# Patient Record
Sex: MALE | Employment: OTHER
[De-identification: names, ages, dates, MRNs, and addresses within clinical notes are randomized per-mention and may not be internally consistent; named-entity substitution may affect disease eponyms.]

---

## 2017-01-11 ENCOUNTER — PRIOR ORIGINAL RECORDS (OUTPATIENT)
Dept: OTHER | Age: 82
End: 2017-01-11

## 2017-02-15 ENCOUNTER — PRIOR ORIGINAL RECORDS (OUTPATIENT)
Dept: OTHER | Age: 82
End: 2017-02-15

## 2017-02-17 ENCOUNTER — APPOINTMENT (OUTPATIENT)
Dept: GENERAL RADIOLOGY | Facility: HOSPITAL | Age: 82
DRG: 872 | End: 2017-02-17
Attending: EMERGENCY MEDICINE
Payer: MEDICARE

## 2017-02-17 ENCOUNTER — HOSPITAL ENCOUNTER (INPATIENT)
Facility: HOSPITAL | Age: 82
LOS: 1 days | Discharge: HOME | DRG: 872 | End: 2017-02-19
Attending: EMERGENCY MEDICINE | Admitting: HOSPITALIST
Payer: MEDICARE

## 2017-02-17 DIAGNOSIS — E86.0 DEHYDRATION: ICD-10-CM

## 2017-02-17 DIAGNOSIS — N39.0 URINARY TRACT INFECTION WITHOUT HEMATURIA, SITE UNSPECIFIED: Primary | ICD-10-CM

## 2017-02-17 DIAGNOSIS — R11.2 NAUSEA AND VOMITING IN ADULT: ICD-10-CM

## 2017-02-17 LAB
ALBUMIN SERPL-MCNC: 3.6 G/DL (ref 3.5–4.8)
ALP LIVER SERPL-CCNC: 88 U/L (ref 45–117)
ALT SERPL-CCNC: 18 U/L (ref 17–63)
AST SERPL-CCNC: 22 U/L (ref 15–41)
BASOPHILS # BLD AUTO: 0.02 X10(3) UL (ref 0–0.1)
BASOPHILS NFR BLD AUTO: 0.2 %
BILIRUB SERPL-MCNC: 0.5 MG/DL (ref 0.1–2)
BILIRUB UR QL STRIP.AUTO: NEGATIVE
BUN BLD-MCNC: 30 MG/DL (ref 8–20)
CALCIUM BLD-MCNC: 8.6 MG/DL (ref 8.3–10.3)
CHLORIDE: 112 MMOL/L (ref 101–111)
CO2: 20 MMOL/L (ref 22–32)
COLOR UR AUTO: YELLOW
CREAT BLD-MCNC: 1.66 MG/DL (ref 0.7–1.3)
EOSINOPHIL # BLD AUTO: 0.06 X10(3) UL (ref 0–0.3)
EOSINOPHIL NFR BLD AUTO: 0.6 %
ERYTHROCYTE [DISTWIDTH] IN BLOOD BY AUTOMATED COUNT: 14 % (ref 11.5–16)
GLUCOSE BLD-MCNC: 117 MG/DL (ref 70–99)
GLUCOSE UR STRIP.AUTO-MCNC: NEGATIVE MG/DL
HCT VFR BLD AUTO: 39.4 % (ref 37–53)
HGB BLD-MCNC: 12.9 G/DL (ref 13–17)
IMMATURE GRANULOCYTE COUNT: 0.04 X10(3) UL (ref 0–1)
IMMATURE GRANULOCYTE RATIO %: 0.4 %
KETONES UR STRIP.AUTO-MCNC: NEGATIVE MG/DL
LACTIC ACID: 1.1 MMOL/L (ref 0.5–2)
LYMPHOCYTES # BLD AUTO: 0.57 X10(3) UL (ref 0.9–4)
LYMPHOCYTES NFR BLD AUTO: 5.5 %
M PROTEIN MFR SERPL ELPH: 7.4 G/DL (ref 6.1–8.3)
MCH RBC QN AUTO: 29.5 PG (ref 27–33.2)
MCHC RBC AUTO-ENTMCNC: 32.7 G/DL (ref 31–37)
MCV RBC AUTO: 90 FL (ref 80–99)
MONOCYTES # BLD AUTO: 0.36 X10(3) UL (ref 0.1–0.6)
MONOCYTES NFR BLD AUTO: 3.5 %
NEUTROPHIL ABS PRELIM: 9.3 X10 (3) UL (ref 1.3–6.7)
NEUTROPHILS # BLD AUTO: 9.3 X10(3) UL (ref 1.3–6.7)
NEUTROPHILS NFR BLD AUTO: 89.8 %
NITRITE UR QL STRIP.AUTO: NEGATIVE
PH UR STRIP.AUTO: 5 [PH] (ref 4.5–8)
PLATELET # BLD AUTO: 191 10(3)UL (ref 150–450)
POTASSIUM SERPL-SCNC: 5.1 MMOL/L (ref 3.6–5.1)
PROCALCITONIN SERPL-MCNC: 0.11 NG/ML (ref ?–0.11)
PROT UR STRIP.AUTO-MCNC: NEGATIVE MG/DL
RBC # BLD AUTO: 4.38 X10(6)UL (ref 3.8–5.8)
RBC #/AREA URNS AUTO: >10 /HPF
RED CELL DISTRIBUTION WIDTH-SD: 45.7 FL (ref 35.1–46.3)
SODIUM SERPL-SCNC: 142 MMOL/L (ref 136–144)
SP GR UR STRIP.AUTO: 1.01 (ref 1–1.03)
UROBILINOGEN UR STRIP.AUTO-MCNC: <2 MG/DL
WBC # BLD AUTO: 10.4 X10(3) UL (ref 4–13)
WBC #/AREA URNS AUTO: >50 /HPF

## 2017-02-17 PROCEDURE — 71010 XR CHEST AP PORTABLE  (CPT=71010): CPT

## 2017-02-17 PROCEDURE — 99223 1ST HOSP IP/OBS HIGH 75: CPT | Performed by: HOSPITALIST

## 2017-02-17 RX ORDER — ACETAMINOPHEN 500 MG
1000 TABLET ORAL ONCE
Status: COMPLETED | OUTPATIENT
Start: 2017-02-17 | End: 2017-02-17

## 2017-02-17 RX ORDER — METOCLOPRAMIDE HYDROCHLORIDE 5 MG/ML
10 INJECTION INTRAMUSCULAR; INTRAVENOUS ONCE
Status: COMPLETED | OUTPATIENT
Start: 2017-02-17 | End: 2017-02-17

## 2017-02-17 RX ORDER — DIPHENHYDRAMINE HYDROCHLORIDE 50 MG/ML
50 INJECTION INTRAMUSCULAR; INTRAVENOUS ONCE
Status: COMPLETED | OUTPATIENT
Start: 2017-02-17 | End: 2017-02-17

## 2017-02-17 RX ORDER — GABAPENTIN 300 MG/1
300 CAPSULE ORAL 3 TIMES DAILY
COMMUNITY
End: 2017-10-16 | Stop reason: ALTCHOICE

## 2017-02-17 RX ORDER — IPRATROPIUM BROMIDE AND ALBUTEROL SULFATE 2.5; .5 MG/3ML; MG/3ML
3 SOLUTION RESPIRATORY (INHALATION) ONCE
Status: COMPLETED | OUTPATIENT
Start: 2017-02-17 | End: 2017-02-17

## 2017-02-17 RX ORDER — ACETAMINOPHEN 650 MG/1
650 SUPPOSITORY RECTAL ONCE
Status: DISCONTINUED | OUTPATIENT
Start: 2017-02-17 | End: 2017-02-17

## 2017-02-17 RX ORDER — ONDANSETRON 2 MG/ML
4 INJECTION INTRAMUSCULAR; INTRAVENOUS ONCE
Status: COMPLETED | OUTPATIENT
Start: 2017-02-17 | End: 2017-02-17

## 2017-02-17 RX ORDER — SODIUM CHLORIDE 9 MG/ML
1000 INJECTION, SOLUTION INTRAVENOUS ONCE
Status: COMPLETED | OUTPATIENT
Start: 2017-02-17 | End: 2017-02-17

## 2017-02-18 ENCOUNTER — PRIOR ORIGINAL RECORDS (OUTPATIENT)
Dept: OTHER | Age: 82
End: 2017-02-18

## 2017-02-18 LAB
BUN BLD-MCNC: 29 MG/DL (ref 8–20)
CALCIUM BLD-MCNC: 7.7 MG/DL (ref 8.3–10.3)
CHLORIDE: 116 MMOL/L (ref 101–111)
CO2: 21 MMOL/L (ref 22–32)
CREAT BLD-MCNC: 1.72 MG/DL (ref 0.7–1.3)
ERYTHROCYTE [DISTWIDTH] IN BLOOD BY AUTOMATED COUNT: 14 % (ref 11.5–16)
GLUCOSE BLD-MCNC: 116 MG/DL (ref 70–99)
HCT VFR BLD AUTO: 36.6 % (ref 37–53)
HGB BLD-MCNC: 11.7 G/DL (ref 13–17)
MCH RBC QN AUTO: 29.6 PG (ref 27–33.2)
MCHC RBC AUTO-ENTMCNC: 32 G/DL (ref 31–37)
MCV RBC AUTO: 92.7 FL (ref 80–99)
PLATELET # BLD AUTO: 145 10(3)UL (ref 150–450)
POTASSIUM SERPL-SCNC: 4.3 MMOL/L (ref 3.6–5.1)
RBC # BLD AUTO: 3.95 X10(6)UL (ref 3.8–5.8)
RED CELL DISTRIBUTION WIDTH-SD: 47 FL (ref 35.1–46.3)
RESPIRATORY PANEL NEG:: NEGATIVE
SODIUM SERPL-SCNC: 145 MMOL/L (ref 136–144)
WBC # BLD AUTO: 8.3 X10(3) UL (ref 4–13)

## 2017-02-18 PROCEDURE — 99232 SBSQ HOSP IP/OBS MODERATE 35: CPT | Performed by: INTERNAL MEDICINE

## 2017-02-18 RX ORDER — BISACODYL 10 MG
10 SUPPOSITORY, RECTAL RECTAL
Status: DISCONTINUED | OUTPATIENT
Start: 2017-02-18 | End: 2017-02-19

## 2017-02-18 RX ORDER — HEPARIN SODIUM 5000 [USP'U]/ML
5000 INJECTION, SOLUTION INTRAVENOUS; SUBCUTANEOUS EVERY 8 HOURS
Status: DISCONTINUED | OUTPATIENT
Start: 2017-02-18 | End: 2017-02-19

## 2017-02-18 RX ORDER — FAMOTIDINE 20 MG/1
40 TABLET ORAL DAILY
Status: DISCONTINUED | OUTPATIENT
Start: 2017-02-18 | End: 2017-02-19

## 2017-02-18 RX ORDER — ASPIRIN 81 MG/1
81 TABLET, CHEWABLE ORAL DAILY
Status: DISCONTINUED | OUTPATIENT
Start: 2017-02-18 | End: 2017-02-19

## 2017-02-18 RX ORDER — SODIUM CHLORIDE 9 MG/ML
INJECTION, SOLUTION INTRAVENOUS CONTINUOUS
Status: DISCONTINUED | OUTPATIENT
Start: 2017-02-18 | End: 2017-02-19

## 2017-02-18 RX ORDER — FINASTERIDE 5 MG/1
5 TABLET, FILM COATED ORAL DAILY
Status: DISCONTINUED | OUTPATIENT
Start: 2017-02-18 | End: 2017-02-19

## 2017-02-18 RX ORDER — SODIUM PHOSPHATE, DIBASIC AND SODIUM PHOSPHATE, MONOBASIC 7; 19 G/133ML; G/133ML
1 ENEMA RECTAL ONCE AS NEEDED
Status: ACTIVE | OUTPATIENT
Start: 2017-02-18 | End: 2017-02-18

## 2017-02-18 RX ORDER — POLYETHYLENE GLYCOL 3350 17 G/17G
17 POWDER, FOR SOLUTION ORAL DAILY PRN
Status: DISCONTINUED | OUTPATIENT
Start: 2017-02-18 | End: 2017-02-19

## 2017-02-18 RX ORDER — SODIUM CHLORIDE 9 MG/ML
INJECTION, SOLUTION INTRAVENOUS CONTINUOUS
Status: ACTIVE | OUTPATIENT
Start: 2017-02-18 | End: 2017-02-18

## 2017-02-18 RX ORDER — ONDANSETRON 2 MG/ML
4 INJECTION INTRAMUSCULAR; INTRAVENOUS EVERY 6 HOURS PRN
Status: DISCONTINUED | OUTPATIENT
Start: 2017-02-18 | End: 2017-02-19

## 2017-02-18 RX ORDER — MIRTAZAPINE 15 MG/1
15 TABLET, FILM COATED ORAL NIGHTLY
Status: DISCONTINUED | OUTPATIENT
Start: 2017-02-18 | End: 2017-02-19

## 2017-02-18 RX ORDER — GABAPENTIN 300 MG/1
300 CAPSULE ORAL 3 TIMES DAILY
Status: DISCONTINUED | OUTPATIENT
Start: 2017-02-18 | End: 2017-02-19

## 2017-02-18 RX ORDER — PRAVASTATIN SODIUM 10 MG
10 TABLET ORAL NIGHTLY
Status: DISCONTINUED | OUTPATIENT
Start: 2017-02-18 | End: 2017-02-19

## 2017-02-18 RX ORDER — CLOPIDOGREL BISULFATE 75 MG/1
75 TABLET ORAL DAILY
Status: DISCONTINUED | OUTPATIENT
Start: 2017-02-18 | End: 2017-02-19

## 2017-02-18 RX ORDER — DOCUSATE SODIUM 100 MG/1
100 CAPSULE, LIQUID FILLED ORAL 2 TIMES DAILY
Status: DISCONTINUED | OUTPATIENT
Start: 2017-02-18 | End: 2017-02-19

## 2017-02-18 NOTE — H&P
[facility] HOSPITALIST  History and Physical     5 [facility]  Patient Status:  Emergency    10/3/1927 MRN [id number]   Location 656 [facility] Attending Angel Leo MD   Hosp Day # 0 PCP Anell Prader MD [place] [address] PERCUTANEOUS  TRANSLUMINAL CORONARY ANGIOPLASTY         Social History:  reports that he has never smoked. He has never used smokeless tobacco. He reports that he does not drink alcohol or use illicit drugs.     Family History:   Family History   Problem Re acute distress. Alert and oriented x 3. HEENT: Normocephalic atraumatic. Moist mucous membranes. EOM-I. PERRLA. Anicteric. Neck: No lymphadenopathy. No JVD. No carotid bruits. Respiratory: Clear to auscultation bilaterally. No wheezes. No rhonchi.   Card bedside.     Senthil Negron, DO  2/17/2017

## 2017-02-18 NOTE — CM/SW NOTE
02/18/17 0800   CM/SW Referral Data   Referral Source Physician;Social Work (self-referral)   Reason for Referral Discharge planning   Informant Patient   Pertinent Medical Hx   Primary Care Physician Name Marysol David   Patient Info   Patient's Mental S

## 2017-02-18 NOTE — PROGRESS NOTES
[facility]  Progress Note    5 [facility]  Patient Status:  Inpatient    10/3/1927 MRN [id number]   [facility] 4NW-A Attending Sameer Miranda MD   Hosp Day # 1 PCP Avel Maine MD [facility]     CC:  Nausea, vomiting, diarrhea at home, g BUN 29 02/18/2017    02/18/2017   K 4.3 02/18/2017    02/18/2017   CO2 21.0 02/18/2017    02/18/2017   CA 7.7 02/18/2017   ALB 3.6 02/17/2017   ALKPHO 88 02/17/2017   BILT 0.5 02/17/2017   TP 7.4 02/17/2017   AST 22 02/17/2017   ALT 18 Plavix. 4. Acute kidney injury due to dehydration-  Will continue IV fluids and recheck kidney function in the morning  5. Coronary artery disease-status post stents in the past.  Will continue on Plavix and statin.   6. Prostate cancer-patient on Lupron a

## 2017-02-18 NOTE — ED PROVIDER NOTES
Patient Seen in: [facility] Emergency Department    History   Patient presents with:  Fever Sepsis (infectious)    Stated Complaint: fever/sepsis    HPI    42-year-old male presents to the emergency department with a cough that began yesterday and t mg by mouth daily. mirtazapine (REMERON) 15 MG Oral Tab,  Take 15 mg by mouth nightly. celecoxib (CELEBREX) 200 MG Oral Cap,  Take 200 mg by mouth See Admin Instructions.  Every 2-3 days   Calcium Carbonate-Vitamin D (OSCAL-500) 500-200 MG-UNIT Oral T rate, regular rhythm, normal heart sounds and intact distal pulses. Pulmonary/Chest: Effort normal. No stridor. Slightly diminished in the bases   Abdominal: Soft. Bowel sounds are normal. There is no tenderness.    Musculoskeletal: Normal range of mot RAINBOW DRAW BLUE   RAINBOW DRAW GOLD   RAINBOW DRAW LAVENDER   RAINBOW DRAW LIGHT GREEN   BLOOD CULTURE   BLOOD CULTURE   URINE CULTURE, ROUTINE   RESPIRATORY PANEL FLU EXPANDED      EKG    Rate, intervals and axes as noted on EKG Report.   Rate: 107  Rh

## 2017-02-18 NOTE — PROGRESS NOTES
Pt.asleep on shift round;now awke for breakfast;son in law at bedside visiting;no complaints;consumed 80% of breakfast;paged Anamika bonner and notified of low bp 75/63 hr-80;md called back writing new order to bolus pt and call her back after

## 2017-02-19 VITALS
OXYGEN SATURATION: 99 % | DIASTOLIC BLOOD PRESSURE: 56 MMHG | TEMPERATURE: 98 F | HEART RATE: 91 BPM | BODY MASS INDEX: 24.89 KG/M2 | WEIGHT: 177.81 LBS | RESPIRATION RATE: 20 BRPM | SYSTOLIC BLOOD PRESSURE: 137 MMHG | HEIGHT: 71 IN

## 2017-02-19 PROBLEM — Z91.81 AT RISK FOR FALLING: Status: ACTIVE | Noted: 2017-02-19

## 2017-02-19 PROCEDURE — 99239 HOSP IP/OBS DSCHRG MGMT >30: CPT | Performed by: INTERNAL MEDICINE

## 2017-02-19 RX ORDER — NITROFURANTOIN 25; 75 MG/1; MG/1
100 CAPSULE ORAL 2 TIMES DAILY
Qty: 16 CAPSULE | Refills: 0 | Status: SHIPPED | OUTPATIENT
Start: 2017-02-19 | End: 2017-02-27

## 2017-02-19 NOTE — PROGRESS NOTES
[facility]  Progress Note    5 [facility]  Patient Status:  Inpatient    10/3/1927 MRN [id number]   [facility] 4NW-A Attending Shelly Naranjo MD   Hosp Day # 2 PCP Marylouise Hollering MD [facility]     CC:  Nausea, vomiting, diarrhea at home, g Bisulfate (PLAVIX) tab 75 mg 75 mg Oral Daily   finasteride (PROSCAR) tab 5 mg 5 mg Oral Daily   gabapentin (NEURONTIN) cap 300 mg 300 mg Oral TID   mirtazapine (REMERON) tab 15 mg 15 mg Oral Nightly   famoTIDine (PEPCID) tab 40 mg 40 mg Oral Daily   Prava Full  · Fatima: Patient straight caths at home for urinary retention as informed by patient, on Fatima while in hospital, DC Fatima at the time of discharge    Estimated date of discharge: today  Discharge is dependent on: clinical progress, await urine cx re

## 2017-02-19 NOTE — DISCHARGE SUMMARY
[facility]  Discharge Summary    5 [facility]  Patient Status:  Inpatient    10/3/1927 MRN [id number]   [facility] 4NW-A Attending Ary Teixeira MD   Hosp Day # 2 PCP Trista Zapata MD [address] [place] [address]     Date of Admission: 2017    Date o any focal weakness.  No hematochezia, melena, hematuria, hemoptysis, or hematemesis    Brief Synopsis:   · Possible UTI with possible mild sepsis on admission-treated with IV fluids IV antibiotics. Blood culture negative. Urine culture with mixed celsa. mouth 2 (two) times daily.     Stop taking on:  2/27/2017   Quantity:  16 capsule   Refills:  0         CONTINUE taking these medications       Instructions Prescription details    aspirin 81 MG Chew   Last time this was given:  81 mg on 2/19/2017  8:27 AM Refills:  0            Where to Get Your Medications      Please  your prescriptions at the location directed by your doctor or nurse     Bring a paper prescription for each of these medications    - Nitrofurantoin Monohyd Macro 100 MG Caps

## 2017-02-19 NOTE — PLAN OF CARE
GENITOURINARY - ADULT    • Absence of urinary retention Progressing        Fatima draining good amounts; output yellow clear;denies any feeling of fullness in bladder;afebrile;remains on iv abt for uti  MUSCULOSKELETAL - ADULT    • Return mobility to safest

## 2017-02-19 NOTE — PHYSICAL THERAPY NOTE
PHYSICAL THERAPY EVALUATION - INPATIENT     Room Number: 421/421-A  Evaluation Date: 2/19/2017  Type of Evaluation: Initial  Physician Order: PT Eval and Treat    Presenting Problem: UTI  Reason for Therapy: Mobility Dysfunction and Discharge Planning utilizes a cane in the community    SUBJECTIVE  The patient reports that he feels like he's ready to go home    Patient self-stated goal is to go home    OBJECTIVE     Fall Risk: High fall risk    WEIGHT BEARING RESTRICTION  Weight Bearing Restriction: Non balance. The patient was able to perform sit to stand transfers with stand by assistance. The patient was able to ambulate 200 feet with a single point cane with decreased foot clearance and step length bilaterally.  The patient demonstrates several small l demonstrate transfers Sit to/from Stand at assistance level: modified independent     Goal #3 Patient is able to ambulate 300 feet with assist device: cane - straight at assistance level: modified independent     Goal #4    Goal #5    Goal #6    Goal Comme

## 2017-02-19 NOTE — PROGRESS NOTES
NURSING DISCHARGE NOTE    Discharged Home via Wheelchair. Accompanied by Family member  Belongings Taken by patient/family.

## 2017-02-20 ENCOUNTER — PRIOR ORIGINAL RECORDS (OUTPATIENT)
Dept: OTHER | Age: 82
End: 2017-02-20

## 2017-02-20 LAB
ATRIAL RATE: 107 BPM
P AXIS: 75 DEGREES
P-R INTERVAL: 172 MS
Q-T INTERVAL: 356 MS
QRS DURATION: 80 MS
QTC CALCULATION (BEZET): 475 MS
R AXIS: 49 DEGREES
T AXIS: 85 DEGREES
VENTRICULAR RATE: 107 BPM

## 2017-02-21 LAB
ALBUMIN: 3.6 G/DL
ALKALINE PHOSPHATATE(ALK PHOS): 88 IU/L
BILIRUBIN TOTAL: 0.5 MG/DL
BUN: 29 MG/DL
BUN: 30 MG/DL
CALCIUM: 7.7 MG/DL
CALCIUM: 8.6 MG/DL
CHLORIDE: 112 MEQ/L
CHLORIDE: 116 MEQ/L
CREATININE, SERUM: 1.66 MG/DL
CREATININE, SERUM: 1.72 MG/DL
GLUCOSE: 116 MG/DL
GLUCOSE: 117 MG/DL
HEMATOCRIT: 36.6 %
HEMATOCRIT: 39.4 %
HEMOGLOBIN: 11.7 G/DL
HEMOGLOBIN: 12.9 G/DL
PLATELETS: 145 K/UL
PLATELETS: 191 K/UL
POTASSIUM, SERUM: 4.3 MEQ/L
POTASSIUM, SERUM: 5.1 MEQ/L
PROTEIN, TOTAL: 7.4 G/DL
RED BLOOD COUNT: 3.95 X 10-6/U
RED BLOOD COUNT: 4.38 X 10-6/U
SGOT (AST): 22 IU/L
SGPT (ALT): 18 IU/L
SODIUM: 142 MEQ/L
SODIUM: 145 MEQ/L
WHITE BLOOD COUNT: 10.4 X 10-3/U
WHITE BLOOD COUNT: 8.3 X 10-3/U

## 2017-02-24 NOTE — CM/SW NOTE
Called daughter regarding home care services, verified [facility] was setup through Dr Jacinto Lew office, agency has been in contact with patient/family.     Toro Webster RN,   Phone [phone]  Pager [phone]

## 2017-03-15 ENCOUNTER — PRIOR ORIGINAL RECORDS (OUTPATIENT)
Dept: OTHER | Age: 82
End: 2017-03-15

## 2017-04-12 ENCOUNTER — PRIOR ORIGINAL RECORDS (OUTPATIENT)
Dept: OTHER | Age: 82
End: 2017-04-12

## 2017-05-10 ENCOUNTER — PRIOR ORIGINAL RECORDS (OUTPATIENT)
Dept: OTHER | Age: 82
End: 2017-05-10

## 2017-06-07 ENCOUNTER — PRIOR ORIGINAL RECORDS (OUTPATIENT)
Dept: OTHER | Age: 82
End: 2017-06-07

## 2017-07-05 ENCOUNTER — PRIOR ORIGINAL RECORDS (OUTPATIENT)
Dept: OTHER | Age: 82
End: 2017-07-05

## 2017-08-02 ENCOUNTER — PRIOR ORIGINAL RECORDS (OUTPATIENT)
Dept: OTHER | Age: 82
End: 2017-08-02

## 2017-08-15 ENCOUNTER — HOSPITAL ENCOUNTER (OUTPATIENT)
Dept: CARDIOLOGY CLINIC | Facility: HOSPITAL | Age: 82
Discharge: HOME | End: 2017-08-15
Attending: INTERNAL MEDICINE

## 2017-08-15 ENCOUNTER — PRIOR ORIGINAL RECORDS (OUTPATIENT)
Dept: OTHER | Age: 82
End: 2017-08-15

## 2017-08-15 ENCOUNTER — MYAURORA ACCOUNT LINK (OUTPATIENT)
Dept: OTHER | Age: 82
End: 2017-08-15

## 2017-08-15 DIAGNOSIS — I70.92: ICD-10-CM

## 2017-08-29 ENCOUNTER — PRIOR ORIGINAL RECORDS (OUTPATIENT)
Dept: OTHER | Age: 82
End: 2017-08-29

## 2017-08-30 ENCOUNTER — PRIOR ORIGINAL RECORDS (OUTPATIENT)
Dept: OTHER | Age: 82
End: 2017-08-30

## 2017-08-31 ENCOUNTER — PRIOR ORIGINAL RECORDS (OUTPATIENT)
Dept: OTHER | Age: 82
End: 2017-08-31

## 2017-09-27 ENCOUNTER — PRIOR ORIGINAL RECORDS (OUTPATIENT)
Dept: OTHER | Age: 82
End: 2017-09-27

## 2017-10-18 ENCOUNTER — LAB ENCOUNTER (OUTPATIENT)
Dept: LAB | Facility: HOSPITAL | Age: 82
End: 2017-10-18
Attending: INTERNAL MEDICINE
Payer: MEDICARE

## 2017-10-18 ENCOUNTER — PRIOR ORIGINAL RECORDS (OUTPATIENT)
Dept: OTHER | Age: 82
End: 2017-10-18

## 2017-10-18 DIAGNOSIS — I70.90 ATHEROSCLEROSIS: ICD-10-CM

## 2017-10-18 PROCEDURE — 80048 BASIC METABOLIC PNL TOTAL CA: CPT

## 2017-10-18 PROCEDURE — 36415 COLL VENOUS BLD VENIPUNCTURE: CPT

## 2017-10-18 PROCEDURE — 85025 COMPLETE CBC W/AUTO DIFF WBC: CPT

## 2017-10-19 ENCOUNTER — APPOINTMENT (OUTPATIENT)
Dept: GENERAL RADIOLOGY | Facility: HOSPITAL | Age: 82
End: 2017-10-19
Attending: EMERGENCY MEDICINE
Payer: MEDICARE

## 2017-10-19 ENCOUNTER — HOSPITAL ENCOUNTER (OUTPATIENT)
Facility: HOSPITAL | Age: 82
Setting detail: OBSERVATION
LOS: 1 days | Discharge: HOME | End: 2017-10-20
Attending: EMERGENCY MEDICINE | Admitting: HOSPITALIST
Payer: MEDICARE

## 2017-10-19 DIAGNOSIS — R19.7 NAUSEA VOMITING AND DIARRHEA: ICD-10-CM

## 2017-10-19 DIAGNOSIS — R55 SYNCOPE AND COLLAPSE: Primary | ICD-10-CM

## 2017-10-19 DIAGNOSIS — R11.2 NAUSEA VOMITING AND DIARRHEA: ICD-10-CM

## 2017-10-19 PROCEDURE — 71010 XR CHEST AP PORTABLE  (CPT=71010): CPT | Performed by: EMERGENCY MEDICINE

## 2017-10-19 PROCEDURE — 99219 INITIAL OBSERVATION CARE,LEVL II: CPT | Performed by: INTERNAL MEDICINE

## 2017-10-19 RX ORDER — ONDANSETRON 2 MG/ML
4 INJECTION INTRAMUSCULAR; INTRAVENOUS EVERY 4 HOURS PRN
Status: DISCONTINUED | OUTPATIENT
Start: 2017-10-19 | End: 2017-10-20

## 2017-10-19 RX ORDER — MULTIPLE VITAMINS W/ MINERALS TAB 9MG-400MCG
1 TAB ORAL DAILY
Status: DISCONTINUED | OUTPATIENT
Start: 2017-10-19 | End: 2017-10-20

## 2017-10-19 RX ORDER — HYDROMORPHONE HYDROCHLORIDE 1 MG/ML
0.5 INJECTION, SOLUTION INTRAMUSCULAR; INTRAVENOUS; SUBCUTANEOUS EVERY 30 MIN PRN
Status: ACTIVE | OUTPATIENT
Start: 2017-10-19 | End: 2017-10-19

## 2017-10-19 RX ORDER — METOCLOPRAMIDE HYDROCHLORIDE 5 MG/ML
5 INJECTION INTRAMUSCULAR; INTRAVENOUS EVERY 8 HOURS PRN
Status: DISCONTINUED | OUTPATIENT
Start: 2017-10-19 | End: 2017-10-20

## 2017-10-19 RX ORDER — SODIUM CHLORIDE 9 MG/ML
INJECTION, SOLUTION INTRAVENOUS CONTINUOUS
Status: DISCONTINUED | OUTPATIENT
Start: 2017-10-19 | End: 2017-10-19

## 2017-10-19 RX ORDER — MIRTAZAPINE 15 MG/1
15 TABLET, FILM COATED ORAL NIGHTLY
Status: DISCONTINUED | OUTPATIENT
Start: 2017-10-19 | End: 2017-10-20

## 2017-10-19 RX ORDER — FINASTERIDE 5 MG/1
5 TABLET, FILM COATED ORAL DAILY
Status: DISCONTINUED | OUTPATIENT
Start: 2017-10-19 | End: 2017-10-20

## 2017-10-19 RX ORDER — ATORVASTATIN CALCIUM 20 MG/1
20 TABLET, FILM COATED ORAL NIGHTLY
Status: DISCONTINUED | OUTPATIENT
Start: 2017-10-19 | End: 2017-10-20

## 2017-10-19 RX ORDER — FAMOTIDINE 20 MG/1
20 TABLET ORAL DAILY
Status: DISCONTINUED | OUTPATIENT
Start: 2017-10-19 | End: 2017-10-20

## 2017-10-19 RX ORDER — HEPARIN SODIUM 5000 [USP'U]/ML
5000 INJECTION, SOLUTION INTRAVENOUS; SUBCUTANEOUS EVERY 12 HOURS SCHEDULED
Status: DISCONTINUED | OUTPATIENT
Start: 2017-10-19 | End: 2017-10-20

## 2017-10-19 RX ORDER — HYDROCODONE/ACETAMINOPHEN 5 MG-500MG
6 TABLET ORAL DAILY
Status: DISCONTINUED | OUTPATIENT
Start: 2017-10-19 | End: 2017-10-19 | Stop reason: RX

## 2017-10-19 RX ORDER — SODIUM CHLORIDE 9 MG/ML
INJECTION, SOLUTION INTRAVENOUS CONTINUOUS
Status: DISCONTINUED | OUTPATIENT
Start: 2017-10-19 | End: 2017-10-20

## 2017-10-19 RX ORDER — GARLIC EXTRACT 500 MG
1 CAPSULE ORAL DAILY
Status: DISCONTINUED | OUTPATIENT
Start: 2017-10-19 | End: 2017-10-20

## 2017-10-19 RX ORDER — SODIUM CHLORIDE 9 MG/ML
125 INJECTION, SOLUTION INTRAVENOUS CONTINUOUS
Status: DISCONTINUED | OUTPATIENT
Start: 2017-10-19 | End: 2017-10-20

## 2017-10-19 RX ORDER — ONDANSETRON 2 MG/ML
4 INJECTION INTRAMUSCULAR; INTRAVENOUS EVERY 6 HOURS PRN
Status: DISCONTINUED | OUTPATIENT
Start: 2017-10-19 | End: 2017-10-20

## 2017-10-19 RX ORDER — ACETAMINOPHEN 325 MG/1
650 TABLET ORAL EVERY 6 HOURS PRN
Status: DISCONTINUED | OUTPATIENT
Start: 2017-10-19 | End: 2017-10-20

## 2017-10-19 NOTE — H&P
[facility] HOSPITALIST  History and Physical     5 [facility]  Patient Status:  Emergency    10/3/1927 MRN [id number]   Location 656 [facility] Attending Bry Massey MD   Hosp Day # 0 PCP Benjy Evans MD [facility]     Chief Compl PERCUTANEOUS  TRANSLUMINAL CORONARY ANGIO*  No date: OTHER SURGICAL HISTORY      Comment: left leg bypass    Social History:  reports that he has never smoked. He has never used smokeless tobacco. He reports that he does not drink alcohol or use drugs. acute distress. Alert and oriented x 3. HEENT: Normocephalic atraumatic. Moist mucous membranes. EOM-I. PERRLA. Anicteric. Neck: No lymphadenopathy. No JVD. No carotid bruits. Respiratory: Clear to auscultation bilaterally. No wheezes. No rhonchi.   Card finasteride  8. Hyperlipidemia continue statin  9.  CAD - holding antiplatelets until cleared by cardiology, continue statin      Quality:  · DVT Prophylaxis: heparin  · CODE status: full  · Fatima: no    Plan of care discussed with pt and daughter, ER    Mi

## 2017-10-19 NOTE — CONSULTS
[facility]                       Gastroenterology [address] [facility] Gastroenterology    [facility] Patient Status:  Observation    10/3/1927 MRN [id number]   [facility] 3NE-A Attending Ray Virk MD   Hosp Day # 0 PCP JULIAN ([facility] [address]     prostate CA; hx lymphoma;skin CA   • CORONARY ARTERY DISEASE     hx stents   • Extrinsic asthma, unspecified    • GENITO-URINARY DISEASE     prostate CA: straight caths   • Hearing impairment    • Hypotension    • Neuropathy    • Osteoarthritis bowel disease  ROS:  In addition to the pertinent positives described above:             Infectious Disease:  No chronic infections or recent fevers prior to the acute illness            Cardiovascular: + CAD s/p stenting (distant), PVD            Respirato CREATSERUM 1.83 10/19/2017   BUN 35 10/19/2017    10/19/2017   K 4.1 10/19/2017    10/19/2017   CO2 23.0 10/19/2017    10/19/2017   CA 9.2 10/19/2017   ALB 3.3 10/19/2017   ALKPHO 87 10/19/2017   BILT 0.3 10/19/2017   AST 12 10/19/2017 cuture  2. Consider CT imaging of colon to assess for colitis     Thank you for the consultation, we will follow the patient with you.   INDER Mccoy  6:06 PM  10/19/2017  [facility] Gastroenterology  [phone]    GI attending addendum:    I have pe

## 2017-10-19 NOTE — ED INITIAL ASSESSMENT (HPI)
Pt has had diarrhea for a few days. Pt w syncope x 2 this am along w/ n/v. Pt to have angioplasty of L leg at 1100.

## 2017-10-19 NOTE — CONSULTS
[facility]  Cardiology Consultation    5 [facility]  Patient Status:  Observation    10/3/1927 MRN [id number]   [facility] 3NE-A Attending Guido Lennon MD   Hosp Day # 0 PCP Radha Tony MD [facility]     Reason for Consultation:  HOSP [facility] DR YUNIER AGUSTIN 0.9%  NaCl infusion, 125 mL/hr, Intravenous, Continuous  •  ondansetron HCl (ZOFRAN) injection 4 mg, 4 mg, Intravenous, Q4H PRN  •  0.9%  NaCl infusion, , Intravenous, Continuous  •  Heparin Sodium (Porcine) 5000 UNIT/ML injection 5,000 Units, 5,000 Units, edema. Peripheral pulses are 2+. Neurologic: Alert and oriented, normal affect. Skin: Warm and dry.      Laboratory Data:    Lab Results  Component Value Date   WBC 8.9 10/19/2017   HGB 10.8 10/19/2017   HCT 34.0 10/19/2017   .0 10/19/2017   CREAT

## 2017-10-19 NOTE — ED PROVIDER NOTES
Patient Seen in: [facility] Emergency Department    History   Patient presents with:  Syncope (cardiovascular, neurologic)  Nausea/Vomiting/Diarrhea (gastrointestinal)    Stated Complaint:     HPI    This is a 59-year-old male who states that he has Onset   • Cancer Mother        Smoking status: Never Smoker                                                              Smokeless tobacco: Never Used                      Alcohol use:  No                Review of Systems    Positive for stated complaint: DIFFERENTIAL - Abnormal; Notable for the following:     RBC 3.73 (*)     HGB 10.8 (*)     HCT 34.0 (*)     RDW-SD 47.2 (*)     Neutrophil Absolute Prelim 6.77 (*)     Neutrophil Absolute 6.77 (*)     All other components within normal limits   TROPONIN I - patient will be admitted for further evaluation I reexamined abdomen soft he does not have any specific complaints of chest pain or shortness of breath he is full code according to his daughter.       Disposition and Plan     Clinical Impression:  Syncope a

## 2017-10-19 NOTE — PROGRESS NOTES
Pharmacy Note: Renal dose adjustment for Metaclopramide (Reglan)  Maikel Garcia has been prescribed Metoclopramide (Reglan) 10 mg every 8 hours as needed. Estimated Creatinine Clearance: 28.6 mL/min (based on SCr of 1.83 mg/dL (H)).     His calculated cr

## 2017-10-19 NOTE — HISTORICAL OFFICE NOTE
Tin Gomez  : 10/03/1927  08/29/17 16:54:00  ACCOUNT:  [id number]  HOME PHONE:  [phone]  WORK PHONE:  [phone]    Phone Conversation:     LE arterial US (dictated to EMR on ) was to be reviewed at  OV, this OV was cancelled and r/s for superficial femoral artery greater than 75% with 50 to 74% stenosis proximally in the distal superficial femoral artery and approximally 50% in the mid superficial femoral artery.   5. One-vessel runoff to the right foot with occluded anterior tibial and po ratio of 5.6 and a little bit proximal to this is elevated velocity of 288 cm/s with a velocity ratio of 3.4, consistent with 50-74% stenosis. The popiteal and tibial peroneal trunk are patent.  There is one-vessel runoff to the right foot with an occluded drug-coated stent in 2006. No real chest pain but significantly fatigued and weak. RISK FACTORS:  CAD Equivalent - Peripheral Vasc Disease    REVIEW OF SYSTEMS:  CONS: no fever, chills, or recent weight changes. EYES: denies significant visual changes. ENT: mucosa pink and moist. NECK: jugular venous pressure not elevated. RESP: respirations with normal rate and rhythm, clear to auscultation. GI: soft, nontender. MS: gait precludes exercise testing and walks with cane. EXT: no clubbing or cyanosis.   SKIN 1200-100  1 daily  05/18/15 Lupron Depot          45MG      injection as directed  05/18/15 Lutein                6MG       1 tablet daily  05/18/15 Mirtazapine           15MG      1 tablet at bedtime  05/18/15 Vitamin A             [id number]  1 tablet at b

## 2017-10-19 NOTE — PROGRESS NOTES
NURSING ADMISSION NOTE      Patient admitted via Cart  Oriented to room. Safety precautions initiated. Bed in low position. Call light in reach.     A/O x 4  Daughter with patient at bedside upon admission  Patient had 550ml out with straight cath up

## 2017-10-20 ENCOUNTER — APPOINTMENT (OUTPATIENT)
Dept: CV DIAGNOSTICS | Facility: HOSPITAL | Age: 82
End: 2017-10-20
Attending: INTERNAL MEDICINE
Payer: MEDICARE

## 2017-10-20 ENCOUNTER — PRIOR ORIGINAL RECORDS (OUTPATIENT)
Dept: OTHER | Age: 82
End: 2017-10-20

## 2017-10-20 ENCOUNTER — APPOINTMENT (OUTPATIENT)
Dept: CT IMAGING | Facility: HOSPITAL | Age: 82
End: 2017-10-20
Attending: INTERNAL MEDICINE
Payer: MEDICARE

## 2017-10-20 VITALS
HEART RATE: 85 BPM | TEMPERATURE: 98 F | SYSTOLIC BLOOD PRESSURE: 153 MMHG | DIASTOLIC BLOOD PRESSURE: 76 MMHG | HEIGHT: 71 IN | RESPIRATION RATE: 20 BRPM | BODY MASS INDEX: 25.25 KG/M2 | OXYGEN SATURATION: 97 % | WEIGHT: 180.38 LBS

## 2017-10-20 PROCEDURE — 93306 TTE W/DOPPLER COMPLETE: CPT | Performed by: INTERNAL MEDICINE

## 2017-10-20 PROCEDURE — 99217 OBSERVATION CARE DISCHARGE: CPT | Performed by: INTERNAL MEDICINE

## 2017-10-20 PROCEDURE — 74176 CT ABD & PELVIS W/O CONTRAST: CPT | Performed by: INTERNAL MEDICINE

## 2017-10-20 RX ORDER — CLOPIDOGREL BISULFATE 75 MG/1
75 TABLET ORAL DAILY
Status: DISCONTINUED | OUTPATIENT
Start: 2017-10-20 | End: 2017-10-20

## 2017-10-20 RX ORDER — GARLIC EXTRACT 500 MG
1 CAPSULE ORAL DAILY
Qty: 90 CAPSULE | Refills: 0 | Status: SHIPPED | OUTPATIENT
Start: 2017-10-21 | End: 2018-01-05

## 2017-10-20 RX ORDER — ASPIRIN 81 MG/1
81 TABLET, CHEWABLE ORAL DAILY
Status: DISCONTINUED | OUTPATIENT
Start: 2017-10-20 | End: 2017-10-20

## 2017-10-20 NOTE — CM/SW NOTE
10/20/17 1100   CM/SW Referral Data   Referral Source    Reason for Referral Discharge planning   Informant Patient   Patient Info   Patient's Mental Status Alert;Oriented   Patient's [address]   Number of Levels in Home 1   Dionne Munoz

## 2017-10-20 NOTE — PROGRESS NOTES
NURSING DISCHARGE NOTE    Discharged Home via Wheelchair. Accompanied by Family member and Support staff  Belongings Taken by patient/family. Reviewed discharge instructions with patient and daughter.   Reviewed instructions on getting holter monito

## 2017-10-20 NOTE — BH PROGRESS NOTE
Attempted echo and informed patient was having a CT scan, will attempt echo again in twenty minutes.

## 2017-10-20 NOTE — CM/SW NOTE
Patient failed inpatient criteria. Second level of review completed and supports observation. UR committee in agreement. Discussed with Dr. Darrell Yoo who approves observation status. Observation letter given to the patient and order written.

## 2017-10-20 NOTE — PROGRESS NOTES
[facility] H[facility]PITAL[facility]  Progress Note     [facility] Patient Status:  Observation    10/3/1927 MRN [id number]   [facility] 3NE-A Attending Ant Cason MD   Hosp Day # 0 PCP Judy Marroquin MD [address] [place] [address]     Chief Complaint: syncope    S: Antonia Nikole Nightly   • multivitamin with minerals  1 tablet Oral Daily   • famoTIDine  20 mg Oral Daily   • atorvastatin  20 mg Oral Nightly   • Acidophilus/Pectin  1 capsule Oral Daily       ASSESSMENT / PLAN:     1.  Syncope and collapse -most likely vasovagal and i

## 2017-10-20 NOTE — PLAN OF CARE
Reviewed echo with Dr. Ranjan Harper. EF low limits normal. At least moderate AS, mean gradient 22 mmHg. Stable for discharge with holter as previously outlined. Follow up in the clinic after holter is completed.       Master Packer NP   10/20/2017  5:13 PM

## 2017-10-20 NOTE — PROGRESS NOTES
[facility]  Progress Note    [facility] Patient Status:  Observation    10/3/1927 MRN [id number]   [facility] 3NE-A Attending Luiz Souza MD   Hosp Day # 0 PCP Pama Backers MD [facility]     Assessment:  1.   Syncope - sounds seconda 10/20/2017   ALT 14 10/19/2017   AST 12 10/19/2017   ALB 3.3 10/19/2017          Lab Results  Component Value Date   TROP <0.046 10/19/2017   TROP <0.046 06/07/2016   TROP <0.046 03/04/2015   CKMB 3.4 09/30/2013   CKMB 2.7 03/02/2012   CKMB 2.5 03/01/2012

## 2017-10-20 NOTE — PROGRESS NOTES
Gastroenterology Progress Note  S: No further diarrhea after the one yesterday, C.diff negative. Feels well.   O: /60 (BP Location: Left arm)   Pulse 92   Temp 98.1 °F (36.7 °C) (Oral)   Resp 20   Ht 5' 11\" (1.803 m)   Wt 180 lb 6.4 oz (81.8 kg)   Sp

## 2017-10-20 NOTE — PLAN OF CARE
Patient A&O x4, no c/o pain, lungs clear. 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 Patient ambulates with walker to bathroom and self caths for urine.

## 2017-10-21 NOTE — DISCHARGE SUMMARY
[facility]OSPITAL[facility]  DISCHARGE SUMMARY     [facility] Patient Status:  Observation    10/3/1927 MRN [id number]   [facility] 3NE-A Attending No att. providers found   Hosp Day # 1 PCP Radha Tony MD [address] [place] [address]     Date of Admission: 10/19/2 vomiting swelling or rash. He has no increased pain in his abdomen or lower extremities. Brief Synopsis: Patient was seen by both cardiology, electrophysiology and GI.   Patient improved rapidly overnight did not have any further diarrhea and C. diffici abnormalities. Doppler parameters are consistent     with abnormal left ventricular relaxation - grade 1 diastolic     dysfunction. 2. Aortic valve: Trileaflet; moderately thickened, moderately calcified     leaflets. Cusp separation was reduced.  Camron Crockett Tabs  Commonly known as:  PLAVIX      Take 75 mg by mouth daily. Refills:  0     finasteride 5 MG Tabs  Commonly known as:  PROSCAR      Take 5 mg by mouth daily. Refills:  0     LUPRON IJ      Inject 1 Dose as directed every 6 (six) months.    Refills: nondistended. Positive bowel sounds. No rebound or guarding. Neurologic: No focal neurological deficits. Musculoskeletal: Moves all extremities. Extremities: No edema.   ----------------------------------------------------------------------------------

## 2017-10-23 ENCOUNTER — PRIOR ORIGINAL RECORDS (OUTPATIENT)
Dept: OTHER | Age: 82
End: 2017-10-23

## 2017-10-25 ENCOUNTER — PRIOR ORIGINAL RECORDS (OUTPATIENT)
Dept: OTHER | Age: 82
End: 2017-10-25

## 2017-10-26 ENCOUNTER — HOSPITAL ENCOUNTER (OUTPATIENT)
Dept: CV DIAGNOSTICS | Facility: HOSPITAL | Age: 82
Discharge: HOME | End: 2017-10-26
Attending: INTERNAL MEDICINE
Payer: MEDICARE

## 2017-10-26 DIAGNOSIS — R55 SYNCOPE AND COLLAPSE: ICD-10-CM

## 2017-10-26 PROCEDURE — 93225 XTRNL ECG REC<48 HRS REC: CPT | Performed by: INTERNAL MEDICINE

## 2017-10-26 PROCEDURE — 93227 XTRNL ECG REC<48 HR R&I: CPT | Performed by: INTERNAL MEDICINE

## 2017-10-26 PROCEDURE — 93226 XTRNL ECG REC<48 HR SCAN A/R: CPT | Performed by: INTERNAL MEDICINE

## 2017-10-31 LAB
BUN: 35 MG/DL
CALCIUM: 9.5 MG/DL
CHLORIDE: 110 MEQ/L
CREATININE, SERUM: 1.77 MG/DL
GLUCOSE: 82 MG/DL
HEMATOCRIT: 35.1 %
HEMOGLOBIN: 11.1 G/DL
PLATELETS: 250 K/UL
POTASSIUM, SERUM: 4.7 MEQ/L
RED BLOOD COUNT: 3.85 X 10-6/U
SODIUM: 142 MEQ/L
WHITE BLOOD COUNT: 7.8 X 10-3/U

## 2017-11-06 LAB
BUN: 31 MG/DL
CALCIUM: 8.4 MG/DL
CHLORIDE: 112 MEQ/L
CREATININE, SERUM: 1.73 MG/DL
GLUCOSE: 100 MG/DL
HEMATOCRIT: 30.9 %
HEMOGLOBIN: 9.8 G/DL
MAGNESIUM: 2.2 MG/DL
PLATELETS: 180 K/UL
POTASSIUM, SERUM: 4.5 MEQ/L
RED BLOOD COUNT: 3.42 X 10-6/U
SODIUM: 144 MEQ/L
WHITE BLOOD COUNT: 7.6 X 10-3/U

## 2017-11-10 ENCOUNTER — PRIOR ORIGINAL RECORDS (OUTPATIENT)
Dept: OTHER | Age: 82
End: 2017-11-10

## 2017-11-22 ENCOUNTER — PRIOR ORIGINAL RECORDS (OUTPATIENT)
Dept: OTHER | Age: 82
End: 2017-11-22

## 2017-12-04 ENCOUNTER — PRIOR ORIGINAL RECORDS (OUTPATIENT)
Dept: OTHER | Age: 82
End: 2017-12-04

## 2017-12-20 ENCOUNTER — PRIOR ORIGINAL RECORDS (OUTPATIENT)
Dept: OTHER | Age: 82
End: 2017-12-20

## 2017-12-31 ENCOUNTER — PRIOR ORIGINAL RECORDS (OUTPATIENT)
Dept: OTHER | Age: 82
End: 2017-12-31

## 2018-01-05 RX ORDER — BICALUTAMIDE 50 MG/1
50 TABLET, FILM COATED ORAL DAILY
COMMUNITY
End: 2019-01-01

## 2018-01-05 NOTE — HISTORICAL OFFICE NOTE
Karolyn Mckenzie  : 10/03/1927  ACCOUNT:  [id number]  [phone]  PCP: Dr. Melly Sanford    TODAY'S DATE: 2017  DICTATED BY:  John Willis M.D.]    CHIEF COMPLAINT: [Followup of .  CAD, of native vessels, Followup of Atherosclerosis, extrem chron tota for premature CAD. Negative for AAA. SOCIAL HISTORY: SMOKING: Never used tobacco. denies smoking. CAFFEINE: 2 beverages daily. ALCOHOL: denies drinking. EXERCISE: no regular exercise. DIET: low fat, low cholesterol and low sodium diet.  MARITAL STATUS: m significant change in symptoms. At this point, he is fairly well compensated. ASSESSMENT:  1. . CAD, of native vessels  2. Atherosclerosis, extrem chron total occlusion  3. History of drug eluting stent LAD, 12-06  4.  History of vascular surgery, l fem

## 2018-01-05 NOTE — HISTORICAL OFFICE NOTE
Ana Love  : 10/03/1927  ACCOUNT:  [id number]  [phone]  PCP: Dr. Kaye Lara     TODAY'S DATE: 10/23/2017  DICTATED BY:  Karlo LOVING]    CHIEF COMPLAINT: [hospital discharge and syncope.]    HPI:    [On 10/23/2017, Satya 84, a 80 year cancer, bone cancer, tonsillectomy Over 20 years ago and Plastic surgery for cancer to nose and face 2008    PAST CV HISTORY: 10/2014, 12/2006, 5/2011, 6/2016, CAD, cardiac catheterization, dyslipidemia, L femoropopliteal above-knee bypass w/ hemashield gr angiogram.  At this time we discussed completing the 48 hour Holter monitor prior to having him undergo peripheral angiogram.  Patient and daughter will check downstairs to see when they are able to put the monitor on.   He is also to be following up with [facility]

## 2018-01-10 ENCOUNTER — HOSPITAL ENCOUNTER (OUTPATIENT)
Dept: INTERVENTIONAL RADIOLOGY/VASCULAR | Facility: HOSPITAL | Age: 83
Discharge: HOME | End: 2018-01-11
Attending: INTERNAL MEDICINE | Admitting: INTERNAL MEDICINE
Payer: MEDICARE

## 2018-01-10 DIAGNOSIS — I70.213 ATHEROSCLER OF NATIVE ARTERY OF BOTH LEGS WITH INTERMIT CLAUDICATION (HCC): ICD-10-CM

## 2018-01-10 LAB
ATRIAL RATE: 87 BPM
BASOPHILS # BLD AUTO: 0.04 X10(3) UL (ref 0–0.1)
BASOPHILS NFR BLD AUTO: 0.6 %
BUN BLD-MCNC: 38 MG/DL (ref 8–20)
CALCIUM BLD-MCNC: 9 MG/DL (ref 8.3–10.3)
CHLORIDE: 113 MMOL/L (ref 101–111)
CO2: 25 MMOL/L (ref 22–32)
CREAT BLD-MCNC: 1.98 MG/DL (ref 0.7–1.3)
EOSINOPHIL # BLD AUTO: 0.26 X10(3) UL (ref 0–0.3)
EOSINOPHIL NFR BLD AUTO: 3.8 %
ERYTHROCYTE [DISTWIDTH] IN BLOOD BY AUTOMATED COUNT: 13.6 % (ref 11.5–16)
GLUCOSE BLD-MCNC: 92 MG/DL (ref 70–99)
HCT VFR BLD AUTO: 31.4 % (ref 37–53)
HGB BLD-MCNC: 10.5 G/DL (ref 13–17)
IMMATURE GRANULOCYTE COUNT: 0.03 X10(3) UL (ref 0–1)
IMMATURE GRANULOCYTE RATIO %: 0.4 %
LYMPHOCYTES # BLD AUTO: 1.24 X10(3) UL (ref 0.9–4)
LYMPHOCYTES NFR BLD AUTO: 18.3 %
MCH RBC QN AUTO: 29.6 PG (ref 27–33.2)
MCHC RBC AUTO-ENTMCNC: 33.4 G/DL (ref 31–37)
MCV RBC AUTO: 88.5 FL (ref 80–99)
MONOCYTES # BLD AUTO: 0.41 X10(3) UL (ref 0.1–0.6)
MONOCYTES NFR BLD AUTO: 6.1 %
NEUTROPHIL ABS PRELIM: 4.79 X10 (3) UL (ref 1.3–6.7)
NEUTROPHILS # BLD AUTO: 4.79 X10(3) UL (ref 1.3–6.7)
NEUTROPHILS NFR BLD AUTO: 70.8 %
P AXIS: 63 DEGREES
P-R INTERVAL: 182 MS
PLATELET # BLD AUTO: 190 10(3)UL (ref 150–450)
POTASSIUM SERPL-SCNC: 4.3 MMOL/L (ref 3.6–5.1)
Q-T INTERVAL: 382 MS
QRS DURATION: 84 MS
QTC CALCULATION (BEZET): 459 MS
R AXIS: 62 DEGREES
RBC # BLD AUTO: 3.55 X10(6)UL (ref 3.8–5.8)
RED CELL DISTRIBUTION WIDTH-SD: 44.6 FL (ref 35.1–46.3)
SODIUM SERPL-SCNC: 146 MMOL/L (ref 136–144)
T AXIS: 78 DEGREES
VENTRICULAR RATE: 87 BPM
WBC # BLD AUTO: 6.8 X10(3) UL (ref 4–13)

## 2018-01-10 PROCEDURE — 85025 COMPLETE CBC W/AUTO DIFF WBC: CPT | Performed by: INTERNAL MEDICINE

## 2018-01-10 PROCEDURE — 75710 ARTERY X-RAYS ARM/LEG: CPT

## 2018-01-10 PROCEDURE — B41GYZZ FLUOROSCOPY OF LEFT LOWER EXTREMITY ARTERIES USING OTHER CONTRAST: ICD-10-PCS | Performed by: INTERNAL MEDICINE

## 2018-01-10 PROCEDURE — 93005 ELECTROCARDIOGRAM TRACING: CPT

## 2018-01-10 PROCEDURE — 80048 BASIC METABOLIC PNL TOTAL CA: CPT | Performed by: INTERNAL MEDICINE

## 2018-01-10 PROCEDURE — 36246 INS CATH ABD/L-EXT ART 2ND: CPT

## 2018-01-10 PROCEDURE — 93010 ELECTROCARDIOGRAM REPORT: CPT | Performed by: INTERNAL MEDICINE

## 2018-01-10 PROCEDURE — 36415 COLL VENOUS BLD VENIPUNCTURE: CPT

## 2018-01-10 RX ORDER — FINASTERIDE 5 MG/1
5 TABLET, FILM COATED ORAL DAILY
Status: DISCONTINUED | OUTPATIENT
Start: 2018-01-11 | End: 2018-01-11

## 2018-01-10 RX ORDER — MIRTAZAPINE 15 MG/1
15 TABLET, FILM COATED ORAL NIGHTLY
Status: DISCONTINUED | OUTPATIENT
Start: 2018-01-10 | End: 2018-01-11

## 2018-01-10 RX ORDER — ASPIRIN 81 MG/1
81 TABLET, CHEWABLE ORAL DAILY
Status: DISCONTINUED | OUTPATIENT
Start: 2018-01-11 | End: 2018-01-11

## 2018-01-10 RX ORDER — BICALUTAMIDE 50 MG/1
50 TABLET, FILM COATED ORAL NIGHTLY
Status: DISCONTINUED | OUTPATIENT
Start: 2018-01-10 | End: 2018-01-11

## 2018-01-10 RX ORDER — LIDOCAINE HYDROCHLORIDE 10 MG/ML
INJECTION, SOLUTION EPIDURAL; INFILTRATION; INTRACAUDAL; PERINEURAL
Status: COMPLETED
Start: 2018-01-10 | End: 2018-01-10

## 2018-01-10 RX ORDER — HEPARIN SODIUM 5000 [USP'U]/ML
INJECTION, SOLUTION INTRAVENOUS; SUBCUTANEOUS
Status: DISCONTINUED
Start: 2018-01-10 | End: 2018-01-10 | Stop reason: WASHOUT

## 2018-01-10 RX ORDER — HEPARIN SODIUM 5000 [USP'U]/ML
INJECTION, SOLUTION INTRAVENOUS; SUBCUTANEOUS
Status: COMPLETED
Start: 2018-01-10 | End: 2018-01-10

## 2018-01-10 RX ORDER — HYDROCODONE BITARTRATE AND ACETAMINOPHEN 5; 325 MG/1; MG/1
1 TABLET ORAL EVERY 4 HOURS PRN
Status: DISCONTINUED | OUTPATIENT
Start: 2018-01-10 | End: 2018-01-11

## 2018-01-10 RX ORDER — ACETAMINOPHEN 325 MG/1
650 TABLET ORAL EVERY 4 HOURS PRN
Status: DISCONTINUED | OUTPATIENT
Start: 2018-01-10 | End: 2018-01-11

## 2018-01-10 RX ORDER — PRAVASTATIN SODIUM 10 MG
10 TABLET ORAL NIGHTLY
Status: DISCONTINUED | OUTPATIENT
Start: 2018-01-10 | End: 2018-01-11

## 2018-01-10 RX ORDER — SODIUM CHLORIDE 9 MG/ML
INJECTION, SOLUTION INTRAVENOUS CONTINUOUS
Status: DISCONTINUED | OUTPATIENT
Start: 2018-01-10 | End: 2018-01-11

## 2018-01-10 RX ORDER — HYDROCODONE BITARTRATE AND ACETAMINOPHEN 5; 325 MG/1; MG/1
2 TABLET ORAL EVERY 4 HOURS PRN
Status: DISCONTINUED | OUTPATIENT
Start: 2018-01-10 | End: 2018-01-11

## 2018-01-10 RX ORDER — CLOPIDOGREL BISULFATE 75 MG/1
75 TABLET ORAL DAILY
Status: DISCONTINUED | OUTPATIENT
Start: 2018-01-11 | End: 2018-01-11

## 2018-01-10 RX ORDER — MIDAZOLAM HYDROCHLORIDE 1 MG/ML
INJECTION INTRAMUSCULAR; INTRAVENOUS
Status: COMPLETED
Start: 2018-01-10 | End: 2018-01-10

## 2018-01-10 RX ADMIN — BICALUTAMIDE 50 MG: 50 TABLET, FILM COATED ORAL at 20:43:00

## 2018-01-10 RX ADMIN — MIRTAZAPINE 15 MG: 15 TABLET, FILM COATED ORAL at 20:44:00

## 2018-01-10 RX ADMIN — PRAVASTATIN SODIUM 10 MG: 10 MG TABLET ORAL at 20:44:00

## 2018-01-10 NOTE — H&P
History & Physical Examination    Patient Name: Ravin Anderson  MRN: [id number]  [facility]: [id number]  YOB: 1927    Diagnosis: CAD, HLD, Metastatic Prostate Cancer, PVD with prior femoral bypass x2, abnormal ultrasound     History of Present Illness DISEASE     hx stents   • Diarrhea, unspecified    • Dizziness    • Easy bruising    • Extrinsic asthma, unspecified    • Eye disease    • Fatigue    • Frequent urination    • GENITO-URINARY DISEASE     prostate CA: straight caths   • Hearing impairment

## 2018-01-11 ENCOUNTER — PRIOR ORIGINAL RECORDS (OUTPATIENT)
Dept: OTHER | Age: 83
End: 2018-01-11

## 2018-01-11 VITALS
DIASTOLIC BLOOD PRESSURE: 76 MMHG | HEART RATE: 86 BPM | SYSTOLIC BLOOD PRESSURE: 112 MMHG | TEMPERATURE: 98 F | WEIGHT: 178.56 LBS | BODY MASS INDEX: 25 KG/M2 | RESPIRATION RATE: 18 BRPM | OXYGEN SATURATION: 96 %

## 2018-01-11 LAB
BASOPHILS # BLD AUTO: 0.05 X10(3) UL (ref 0–0.1)
BASOPHILS NFR BLD AUTO: 0.7 %
BUN BLD-MCNC: 35 MG/DL (ref 8–20)
CALCIUM BLD-MCNC: 9 MG/DL (ref 8.3–10.3)
CHLORIDE: 110 MMOL/L (ref 101–111)
CO2: 25 MMOL/L (ref 22–32)
CREAT BLD-MCNC: 1.71 MG/DL (ref 0.7–1.3)
EOSINOPHIL # BLD AUTO: 0.47 X10(3) UL (ref 0–0.3)
EOSINOPHIL NFR BLD AUTO: 6.4 %
ERYTHROCYTE [DISTWIDTH] IN BLOOD BY AUTOMATED COUNT: 13.8 % (ref 11.5–16)
GLUCOSE BLD-MCNC: 105 MG/DL (ref 70–99)
HCT VFR BLD AUTO: 32.4 % (ref 37–53)
HGB BLD-MCNC: 10.5 G/DL (ref 13–17)
IMMATURE GRANULOCYTE COUNT: 0.05 X10(3) UL (ref 0–1)
IMMATURE GRANULOCYTE RATIO %: 0.7 %
LYMPHOCYTES # BLD AUTO: 1.84 X10(3) UL (ref 0.9–4)
LYMPHOCYTES NFR BLD AUTO: 25.2 %
MCH RBC QN AUTO: 28.8 PG (ref 27–33.2)
MCHC RBC AUTO-ENTMCNC: 32.4 G/DL (ref 31–37)
MCV RBC AUTO: 89 FL (ref 80–99)
MONOCYTES # BLD AUTO: 0.51 X10(3) UL (ref 0.1–0.6)
MONOCYTES NFR BLD AUTO: 7 %
NEUTROPHIL ABS PRELIM: 4.37 X10 (3) UL (ref 1.3–6.7)
NEUTROPHILS # BLD AUTO: 4.37 X10(3) UL (ref 1.3–6.7)
NEUTROPHILS NFR BLD AUTO: 60 %
PLATELET # BLD AUTO: 207 10(3)UL (ref 150–450)
POTASSIUM SERPL-SCNC: 4.3 MMOL/L (ref 3.6–5.1)
RBC # BLD AUTO: 3.64 X10(6)UL (ref 3.8–5.8)
RED CELL DISTRIBUTION WIDTH-SD: 44.9 FL (ref 35.1–46.3)
SODIUM SERPL-SCNC: 142 MMOL/L (ref 136–144)
WBC # BLD AUTO: 7.3 X10(3) UL (ref 4–13)

## 2018-01-11 PROCEDURE — 80048 BASIC METABOLIC PNL TOTAL CA: CPT | Performed by: INTERNAL MEDICINE

## 2018-01-11 PROCEDURE — 85025 COMPLETE CBC W/AUTO DIFF WBC: CPT | Performed by: INTERNAL MEDICINE

## 2018-01-11 RX ADMIN — CLOPIDOGREL BISULFATE 75 MG: 75 TABLET ORAL at 08:25:00

## 2018-01-11 RX ADMIN — ASPIRIN 81 MG: 81 TABLET, CHEWABLE ORAL at 08:25:00

## 2018-01-11 RX ADMIN — FINASTERIDE 5 MG: 5 TABLET, FILM COATED ORAL at 08:25:00

## 2018-01-11 NOTE — CM/SW NOTE
01/11/18 1200   CM/SW Screening   Referral Source Social Work (self-referral)     Patient was screened during rounds and no needs are identified at this time. RN to contact SW/CM if needs arise.

## 2018-01-11 NOTE — PROGRESS NOTES
[facility]  Cardiology Progress Note    Subjective:  No chest pain or shortness of breath. Feels good. Denies any groin pain.     Objective:  /76 (BP Location: Right arm)   Pulse 86   Temp 98.2 °F (36.8 °C) (Oral)   Resp 18   Wt 178 lb 9.2 oz ( diarrhea    POD #1 s/p peripheral angiography - plan for ongoing medical therapy    Plan:  - Home today  - f/u in office ~2 weeks, call for appointment with IN[facility]  - See AVS for medications    INDER Strong  1/11/2018  10:27 AM

## 2018-01-11 NOTE — PROGRESS NOTES
NURSING DISCHARGE NOTE    Discharged home via private car  Accompanied by son in law, transport via w/c   Belongings packed up by patient and taken with    All d/c instructions discussed with patient and son in law. Medications and f/u appts discussed.

## 2018-01-11 NOTE — PLAN OF CARE
Assumed care at 1900. A&Ox4, VSS on RA. SR on tele. Rt groin with gauze and tegaderm, dry and intact. Site soft, non-tender. Denies any pain. Pedal pulses +1-2. Up with contact guard/steadingy assist with a cane. Pt straight caths prn as per baseline.  Need

## 2018-01-11 NOTE — PROGRESS NOTES
NURSING ADMISSION NOTE      Patient admitted via IV suites- cath lab  Oriented to room. Safety precautions initiated. Bed in low position. Call light in reach.     Assumed care of patient at 1830  AOx4, [place]- has bilateral HA at bedside, RAÚL PEARSON on te

## 2018-01-11 NOTE — PLAN OF CARE
Assumed care of patient at 0730  AOx4- Ewiiaapaayp, RA, NSR on tele  Denies pain  VSS  Tolerating diet with good appetite  Right groin with tegaderm c/d/i- soft, nontender  Patient self straight caths without complication  Up with SBA  Contact isolation maintained

## 2018-01-17 ENCOUNTER — PRIOR ORIGINAL RECORDS (OUTPATIENT)
Dept: OTHER | Age: 83
End: 2018-01-17

## 2018-01-25 ENCOUNTER — PRIOR ORIGINAL RECORDS (OUTPATIENT)
Dept: OTHER | Age: 83
End: 2018-01-25

## 2018-01-25 ENCOUNTER — MYAURORA ACCOUNT LINK (OUTPATIENT)
Dept: OTHER | Age: 83
End: 2018-01-25

## 2018-01-25 LAB
BUN: 35 MG/DL
CALCIUM: 9 MG/DL
CHLORIDE: 110 MEQ/L
CREATININE, SERUM: 1.71 MG/DL
GLUCOSE: 105 MG/DL
HEMATOCRIT: 32.4 %
HEMOGLOBIN: 10.5 G/DL
PLATELETS: 207 K/UL
POTASSIUM, SERUM: 4.3 MEQ/L
RED BLOOD COUNT: 3.64 X 10-6/U
SODIUM: 142 MEQ/L
WHITE BLOOD COUNT: 7.3 X 10-3/U

## 2018-02-14 ENCOUNTER — PRIOR ORIGINAL RECORDS (OUTPATIENT)
Dept: OTHER | Age: 83
End: 2018-02-14

## 2018-03-14 ENCOUNTER — PRIOR ORIGINAL RECORDS (OUTPATIENT)
Dept: OTHER | Age: 83
End: 2018-03-14

## 2018-03-15 ENCOUNTER — PRIOR ORIGINAL RECORDS (OUTPATIENT)
Dept: OTHER | Age: 83
End: 2018-03-15

## 2018-03-16 ENCOUNTER — PRIOR ORIGINAL RECORDS (OUTPATIENT)
Dept: OTHER | Age: 83
End: 2018-03-16

## 2018-03-19 ENCOUNTER — PRIOR ORIGINAL RECORDS (OUTPATIENT)
Dept: OTHER | Age: 83
End: 2018-03-19

## 2018-04-11 ENCOUNTER — PRIOR ORIGINAL RECORDS (OUTPATIENT)
Dept: OTHER | Age: 83
End: 2018-04-11

## 2018-05-09 ENCOUNTER — PRIOR ORIGINAL RECORDS (OUTPATIENT)
Dept: OTHER | Age: 83
End: 2018-05-09

## 2018-06-11 ENCOUNTER — PRIOR ORIGINAL RECORDS (OUTPATIENT)
Dept: OTHER | Age: 83
End: 2018-06-11

## 2018-06-11 ENCOUNTER — HOSPITAL ENCOUNTER (OUTPATIENT)
Dept: GENERAL RADIOLOGY | Facility: HOSPITAL | Age: 83
Discharge: HOME | DRG: 246 | End: 2018-06-11
Attending: INTERNAL MEDICINE
Payer: MEDICARE

## 2018-06-11 ENCOUNTER — LAB ENCOUNTER (OUTPATIENT)
Dept: LAB | Facility: HOSPITAL | Age: 83
DRG: 246 | End: 2018-06-11
Attending: INTERNAL MEDICINE
Payer: MEDICARE

## 2018-06-11 ENCOUNTER — MYAURORA ACCOUNT LINK (OUTPATIENT)
Dept: OTHER | Age: 83
End: 2018-06-11

## 2018-06-11 DIAGNOSIS — Z01.818 PRE-OP TESTING: ICD-10-CM

## 2018-06-11 DIAGNOSIS — Z01.818 PRE-PROCEDURAL EXAMINATION: ICD-10-CM

## 2018-06-11 DIAGNOSIS — Z95.5 PRESENCE OF CORONARY ANGIOPLASTY IMPLANT AND GRAFT: Primary | ICD-10-CM

## 2018-06-11 PROCEDURE — 71046 X-RAY EXAM CHEST 2 VIEWS: CPT | Performed by: INTERNAL MEDICINE

## 2018-06-11 PROCEDURE — 36415 COLL VENOUS BLD VENIPUNCTURE: CPT

## 2018-06-11 PROCEDURE — 85025 COMPLETE CBC W/AUTO DIFF WBC: CPT

## 2018-06-11 PROCEDURE — 80053 COMPREHEN METABOLIC PANEL: CPT

## 2018-06-13 ENCOUNTER — APPOINTMENT (OUTPATIENT)
Dept: ULTRASOUND IMAGING | Facility: HOSPITAL | Age: 83
DRG: 246 | End: 2018-06-13
Attending: INTERNAL MEDICINE
Payer: MEDICARE

## 2018-06-13 ENCOUNTER — PRIOR ORIGINAL RECORDS (OUTPATIENT)
Dept: OTHER | Age: 83
End: 2018-06-13

## 2018-06-13 ENCOUNTER — HOSPITAL ENCOUNTER (INPATIENT)
Facility: HOSPITAL | Age: 83
LOS: 24 days | Discharge: NURSING, REHAB OR LONG-TERM CARE | DRG: 246 | End: 2018-07-07
Attending: INTERNAL MEDICINE | Admitting: INTERNAL MEDICINE
Payer: MEDICARE

## 2018-06-13 DIAGNOSIS — N20.1 OBSTRUCTION OF LEFT URETEROPELVIC JUNCTION (UPJ) DUE TO STONE: ICD-10-CM

## 2018-06-13 PROBLEM — R79.89 ELEVATED SERUM CREATININE: Status: ACTIVE | Noted: 2018-06-13

## 2018-06-13 LAB
BILIRUB UR QL STRIP.AUTO: NEGATIVE
BUN BLD-MCNC: 39 MG/DL (ref 8–20)
CALCIUM BLD-MCNC: 9.1 MG/DL (ref 8.3–10.3)
CHLORIDE: 112 MMOL/L (ref 101–111)
CO2: 21 MMOL/L (ref 22–32)
COLOR UR AUTO: YELLOW
CREAT BLD-MCNC: 3.11 MG/DL (ref 0.7–1.3)
CREAT UR-SCNC: 88.8 MG/DL
GLUCOSE BLD-MCNC: 98 MG/DL (ref 70–99)
GLUCOSE UR STRIP.AUTO-MCNC: NEGATIVE MG/DL
INR BLD: 1.08 (ref 0.9–1.1)
KETONES UR STRIP.AUTO-MCNC: NEGATIVE MG/DL
MICROALBUMIN UR-MCNC: 10.2 MG/DL
MICROALBUMIN/CREAT 24H UR-RTO: 114.9 UG/MG (ref ?–30)
NITRITE UR QL STRIP.AUTO: NEGATIVE
PH UR STRIP.AUTO: 5 [PH] (ref 4.5–8)
POTASSIUM SERPL-SCNC: 4.6 MMOL/L (ref 3.6–5.1)
PROT UR STRIP.AUTO-MCNC: 30 MG/DL
PSA SERPL DL<=0.01 NG/ML-MCNC: 14.4 SECONDS (ref 12.4–14.7)
RBC #/AREA URNS AUTO: >10 /HPF
SODIUM SERPL-SCNC: 140 MMOL/L (ref 136–144)
SODIUM SERPL-SCNC: 69 MMOL/L
SP GR UR STRIP.AUTO: 1.01 (ref 1–1.03)
UROBILINOGEN UR STRIP.AUTO-MCNC: <2 MG/DL
WBC #/AREA URNS AUTO: >50 /HPF

## 2018-06-13 PROCEDURE — 76770 US EXAM ABDO BACK WALL COMP: CPT | Performed by: INTERNAL MEDICINE

## 2018-06-13 PROCEDURE — 99222 1ST HOSP IP/OBS MODERATE 55: CPT | Performed by: INTERNAL MEDICINE

## 2018-06-13 RX ORDER — RANITIDINE 300 MG/1
300 TABLET ORAL DAILY
Status: DISCONTINUED | OUTPATIENT
Start: 2018-06-13 | End: 2018-06-13 | Stop reason: ALTCHOICE

## 2018-06-13 RX ORDER — BICALUTAMIDE 50 MG/1
50 TABLET, FILM COATED ORAL DAILY
Status: DISCONTINUED | OUTPATIENT
Start: 2018-06-14 | End: 2018-07-07

## 2018-06-13 RX ORDER — NITROFURANTOIN 25; 75 MG/1; MG/1
100 CAPSULE ORAL 2 TIMES DAILY
Status: DISCONTINUED | OUTPATIENT
Start: 2018-06-13 | End: 2018-06-13

## 2018-06-13 RX ORDER — FAMOTIDINE 20 MG/1
20 TABLET ORAL
Status: DISCONTINUED | OUTPATIENT
Start: 2018-06-14 | End: 2018-07-07

## 2018-06-13 RX ORDER — MIRTAZAPINE 15 MG/1
15 TABLET, FILM COATED ORAL NIGHTLY
Status: DISCONTINUED | OUTPATIENT
Start: 2018-06-13 | End: 2018-07-07

## 2018-06-13 RX ORDER — ASPIRIN 81 MG/1
81 TABLET, CHEWABLE ORAL DAILY
Status: DISCONTINUED | OUTPATIENT
Start: 2018-06-14 | End: 2018-06-25

## 2018-06-13 RX ORDER — SODIUM CHLORIDE 9 MG/ML
INJECTION, SOLUTION INTRAVENOUS CONTINUOUS
Status: DISCONTINUED | OUTPATIENT
Start: 2018-06-13 | End: 2018-06-15

## 2018-06-13 RX ORDER — CLOPIDOGREL BISULFATE 75 MG/1
75 TABLET ORAL DAILY
Status: DISCONTINUED | OUTPATIENT
Start: 2018-06-14 | End: 2018-06-16

## 2018-06-13 RX ORDER — ATORVASTATIN CALCIUM 20 MG/1
20 TABLET, FILM COATED ORAL NIGHTLY
Status: DISCONTINUED | OUTPATIENT
Start: 2018-06-13 | End: 2018-07-07

## 2018-06-13 RX ORDER — ACETAMINOPHEN 325 MG/1
650 TABLET ORAL EVERY 6 HOURS PRN
Status: DISCONTINUED | OUTPATIENT
Start: 2018-06-13 | End: 2018-06-20

## 2018-06-13 RX ORDER — FINASTERIDE 5 MG/1
5 TABLET, FILM COATED ORAL DAILY
Status: DISCONTINUED | OUTPATIENT
Start: 2018-06-14 | End: 2018-07-07

## 2018-06-13 NOTE — H&P
Kermit Mendoza  : 10/03/1927  ACCOUNT:  [id number]  [phone]  PCP: Dr. Alla Bucio     TODAY'S DATE: 2018  DICTATED BY:  [Dr. Bishop Prude: [Followup of .  CAD, of native vessels, Followup of Atherosclerosis, extrem chron tot Never used tobacco. denies smoking. CAFFEINE: 2 beverages daily. ALCOHOL: denies drinking. EXERCISE: no regular exercise. DIET: low fat,low cholesterol and low sodium diet. MARITAL STATUS: . EDUCATION: high school graduate. OCCUPATION: retired.  RESI months. Patient has history of prostate cancer. Is on new therapy for this. ASSESSMENT:  1. . CAD, of native vessels  2. Atherosclerosis, extrem chron total occlusion  3. History of drug eluting stent lad, 12-06  4.  History of vascular surgery, l fe

## 2018-06-13 NOTE — CONSULTS
[facility]  Report of Consultation    5 [facility]  Patient Status:  Inpatient    10/3/1927 MRN [id number]   [facility] 2NE-A Attending Guido Peña MD   Hosp Day # 0 PCP Sary Simons MD [facility]     Reason for Consultation:  ETHAN/CKD ANGIO*  No date: OTHER SURGICAL HISTORY      Comment: left leg bypass  Family History   Problem Relation Age of Onset   • Cancer Mother       reports that he has never smoked.  He has never used smokeless tobacco. He reports that he does not drink alcohol o 11/23/2013 27 (H)   ----------  CREATININE (mg/dL)   Date Value   12/03/2013 1.44 (H)   11/25/2013 1.05   11/23/2013 1.27 (H)   ----------  Creatinine (mg/dL)   Date Value   06/11/2018 3.30 (H)   01/11/2018 1.71 (H)   01/10/2018 1.98 (H)   ----------

## 2018-06-14 ENCOUNTER — ANESTHESIA EVENT (OUTPATIENT)
Dept: INTERVENTIONAL RADIOLOGY/VASCULAR | Facility: HOSPITAL | Age: 83
DRG: 246 | End: 2018-06-14
Payer: MEDICARE

## 2018-06-14 ENCOUNTER — APPOINTMENT (OUTPATIENT)
Dept: CT IMAGING | Facility: HOSPITAL | Age: 83
DRG: 246 | End: 2018-06-14
Attending: INTERNAL MEDICINE
Payer: MEDICARE

## 2018-06-14 ENCOUNTER — PRIOR ORIGINAL RECORDS (OUTPATIENT)
Dept: OTHER | Age: 83
End: 2018-06-14

## 2018-06-14 LAB
ALBUMIN SERPL-MCNC: 2.9 G/DL (ref 3.5–4.8)
ALP LIVER SERPL-CCNC: 67 U/L (ref 45–117)
ALT SERPL-CCNC: 13 U/L (ref 17–63)
AST SERPL-CCNC: 12 U/L (ref 15–41)
ATRIAL RATE: 83 BPM
BILIRUB SERPL-MCNC: 0.2 MG/DL (ref 0.1–2)
BUN BLD-MCNC: 37 MG/DL (ref 8–20)
CALCIUM BLD-MCNC: 8.2 MG/DL (ref 8.3–10.3)
CHLORIDE: 115 MMOL/L (ref 101–111)
CO2: 21 MMOL/L (ref 22–32)
CREAT BLD-MCNC: 2.74 MG/DL (ref 0.7–1.3)
ERYTHROCYTE [DISTWIDTH] IN BLOOD BY AUTOMATED COUNT: 13.4 % (ref 11.5–16)
GLUCOSE BLD-MCNC: 102 MG/DL (ref 70–99)
HCT VFR BLD AUTO: 27.4 % (ref 37–53)
HGB BLD-MCNC: 8.8 G/DL (ref 13–17)
M PROTEIN MFR SERPL ELPH: 6.7 G/DL (ref 6.1–8.3)
MCH RBC QN AUTO: 29.3 PG (ref 27–33.2)
MCHC RBC AUTO-ENTMCNC: 32.1 G/DL (ref 31–37)
MCV RBC AUTO: 91.3 FL (ref 80–99)
P AXIS: 78 DEGREES
P-R INTERVAL: 186 MS
PLATELET # BLD AUTO: 190 10(3)UL (ref 150–450)
POTASSIUM SERPL-SCNC: 4.4 MMOL/L (ref 3.6–5.1)
Q-T INTERVAL: 396 MS
QRS DURATION: 90 MS
QTC CALCULATION (BEZET): 465 MS
R AXIS: 67 DEGREES
RBC # BLD AUTO: 3 X10(6)UL (ref 3.8–5.8)
RED CELL DISTRIBUTION WIDTH-SD: 44.7 FL (ref 35.1–46.3)
SODIUM SERPL-SCNC: 143 MMOL/L (ref 136–144)
T AXIS: 74 DEGREES
VENTRICULAR RATE: 83 BPM
WBC # BLD AUTO: 6.8 X10(3) UL (ref 4–13)

## 2018-06-14 PROCEDURE — 99232 SBSQ HOSP IP/OBS MODERATE 35: CPT | Performed by: INTERNAL MEDICINE

## 2018-06-14 PROCEDURE — 74176 CT ABD & PELVIS W/O CONTRAST: CPT | Performed by: INTERNAL MEDICINE

## 2018-06-14 RX ORDER — SODIUM CHLORIDE 9 MG/ML
INJECTION, SOLUTION INTRAVENOUS CONTINUOUS
Status: CANCELLED | OUTPATIENT
Start: 2018-06-14

## 2018-06-14 RX ORDER — SULFAMETHOXAZOLE AND TRIMETHOPRIM 400; 80 MG/1; MG/1
1 TABLET ORAL EVERY 12 HOURS SCHEDULED
Status: DISCONTINUED | OUTPATIENT
Start: 2018-06-14 | End: 2018-06-16

## 2018-06-14 RX ORDER — ASPIRIN 81 MG/1
324 TABLET, CHEWABLE ORAL ONCE
Status: CANCELLED | OUTPATIENT
Start: 2018-06-14 | End: 2018-06-14

## 2018-06-14 NOTE — PROGRESS NOTES
[facility]  Nephrology Progress Note    Juan Antonio Fulton Patient Status:  Inpatient    10/3/1927 MRN [id number]   [facility] 2NE-A Attending Shikha Diop MD   Hosp Day # 1 PCP Tula Pizza SR MD [facility]       SUBJECTIVE:  No acute events, u/s noted      Meds:     Current Facility-Administered Medications:  acetaminophen (TYLENOL) tab 650 mg 650 mg Oral Q6H PRN   magnesium hydroxide (MILK OF MAGNESIA) 400 MG/5ML suspension 30 mL 30 mL Oral Daily PRN   0.9%  NaCl infusion  Intravenous Continu

## 2018-06-14 NOTE — CM/SW NOTE
06/14/18 1342   CM/SW Referral Data   Referral Source    Reason for Referral Discharge planning   Informant Patient; Children  (dtr - [place])   Pertinent Medical Hx   Primary Care Physician Name dr Jesusita Villa Past Medical/Mental H

## 2018-06-14 NOTE — CONSULTS
[facility] Urology   Consultation Note    [address] Patient Status:  Inpatient    10/3/1927 MRN [id number]   [facility] 2NE-A Attending Chastity Cruz MD   Hosp Day # 1 PCP Letty Gordon MD [facility]     Reason for Consultation: DISEASE     prostate CA: straight caths   • Hearing impairment    • Hearing loss    • Hypotension    • Indigestion    • Neuropathy    • Osteoarthritis    • Osteoporosis    • Pain in joints    • PERIPHERAL VASCULAR DISEASE 2007    left leg bypass sx   • Per abdomen is soft and nontender without rebound or guarding. The bladder is non-palpable. BACK: Without CVA tenderness. SKIN: Without stigmata. NEUROLOGIC: Grossly intact. EXTREMITIES: Without edema.       Laboratory Data:    Lab Results  Component V NODES:  No adenopathy. PELVIC ORGANS:  Pelvic organs appropriate for patient age. BONES:  Stable including degenerative change and multiple sclerotic foci. LUNG BASES:  Pleural-parenchymal scarring.       =====  CONCLUSION:    1.  Diverticular dise Aspiration pneumonia ([facility] [address]     Acute kidney injury ([facility] [address]     Renal insufficiency     Urinary tract infection     Staphylococcal pneumonia (HCC)     Vomiting     Dehydration     Diarrhea     GARCIA (dyspnea on exertion)     ETHAN (acute kidney injury) ([address]

## 2018-06-14 NOTE — PROGRESS NOTES
Patient seen in follow-up. Abdominal/pelvic CT scan without contrast 6/14/18:  CONCLUSION:    1. There is marked left hydronephrosis and left hydroureter.   There is a distal left ureteral calculus measuring 6 x 5 mm at the left ureteral vesical junctio obtain results of last week's urine culture from his outside urologist.      Call has been placed to cardiology to discuss. Daughter ([address] [facility] [place]) Brooke Clemons [phone]. Above discussed with nurse.      Bree Littlejohn P.A.-C  [facility] Urology

## 2018-06-14 NOTE — ANESTHESIA PREPROCEDURE EVALUATION
PRE-OP EVALUATION    Patient Name: Daisy Arciniega    Pre-op Diagnosis: IN PATIENT    Procedure(s):  CYSTOSCOPY, LEFT RETROGRADE PYELOGRAM POSSIBLE LEFT URETEROSCOPY WITH LASER LITHOTRIPSY, INSERTION LEFT URETERAL STENT    Surgeon(s) and Role:     * Jennifer Fay daily.   Disp:  Rfl:    Calcium Carbonate-Vitamin D (OSCAL-500) 500-200 MG-UNIT Oral Tab Take 1 tablet by mouth daily. Disp:  Rfl:    Leuprolide Acetate (LUPRON IJ) Inject 1 Dose as directed every 6 (six) months.    Disp:  Rfl:    denosumab (XGEVA) 120 MG/1 ([facility] [address] Extrinsic asthma, unspecified  Hearing impairment Visual impairment  GENITO-URINARY DISEASE Anemia  Hypotension Pneumonia, organism unspecified(486)  Peripheral vascular disease (HCC) Neuropathy  Osteoarthritis Abdominal pain  Bloating Diarrhea, unsp Compared to the prior study, the prior study is  technically very limited. Left ventricle:  The cavity size was normal. Wall thickness was normal.  Systolic function was at the lower limits of normal. The estimated ejection  fraction was 50-55%.  There w normal.  Ascending aorta: The ascending aorta was normal.  Pulmonary artery:   The main pulmonary artery was normal-sized. Systolic  pressure was within the normal range, in the range of 25mm Hg to 30mm Hg. Systemic veins:  Inferior vena cava:  The vessel catherterized him to avoid the chest pain he was experiencing with ambulation.  Given that this is not an urgent/emergent procedure and his ongoing angina, along with the possible option of a nephrostomy tube with radiology to decompress his kidney, we will

## 2018-06-14 NOTE — PROGRESS NOTES
[facility]  Cardiology Progress Note    Subjective:  No chest pain or shortness of breath.     Objective:  /77 (BP Location: Left arm)   Pulse 87   Temp 98.7 °F (37.1 °C) (Oral)   Resp 18   Wt 168 lb 8 oz (76.4 kg)   SpO2 95%   BMI 24.18 kg/m² phil and alma MAYFIELD from urology, patient found to have a kidney stone requiring surgical intervention. From a cardiac standpoint, the patient is at increased but acceptable risk for this procedure.  We will plan for stone removal tomorrow, and possible co

## 2018-06-14 NOTE — PROGRESS NOTES
Received results of Ct abdomen/pelvis per Dr. Lu Ruiz. Spoke with urology answering service paged [facility] with results.

## 2018-06-14 NOTE — PLAN OF CARE
CARDIOVASCULAR - ADULT    • Maintains optimal cardiac output and hemodynamic stability Progressing    • Absence of cardiac arrhythmias or at baseline Progressing        METABOLIC/FLUID AND ELECTROLYTES - ADULT    • Electrolytes maintained within normal ortiz

## 2018-06-15 ENCOUNTER — ANESTHESIA (OUTPATIENT)
Dept: INTERVENTIONAL RADIOLOGY/VASCULAR | Facility: HOSPITAL | Age: 83
DRG: 246 | End: 2018-06-15
Payer: MEDICARE

## 2018-06-15 ENCOUNTER — SURGERY (OUTPATIENT)
Age: 83
End: 2018-06-15

## 2018-06-15 LAB
ATRIAL RATE: 84 BPM
ATRIAL RATE: 94 BPM
BASOPHILS # BLD AUTO: 0.04 X10(3) UL (ref 0–0.1)
BASOPHILS NFR BLD AUTO: 0.6 %
BUN BLD-MCNC: 34 MG/DL (ref 8–20)
CALCIUM BLD-MCNC: 7.8 MG/DL (ref 8.3–10.3)
CHLORIDE: 117 MMOL/L (ref 101–111)
CO2: 18 MMOL/L (ref 22–32)
CREAT BLD-MCNC: 2.7 MG/DL (ref 0.7–1.3)
EOSINOPHIL # BLD AUTO: 0.29 X10(3) UL (ref 0–0.3)
EOSINOPHIL NFR BLD AUTO: 4.1 %
ERYTHROCYTE [DISTWIDTH] IN BLOOD BY AUTOMATED COUNT: 13.4 % (ref 11.5–16)
GLUCOSE BLD-MCNC: 99 MG/DL (ref 70–99)
HCT VFR BLD AUTO: 28.6 % (ref 37–53)
HGB BLD-MCNC: 9.4 G/DL (ref 13–17)
IMMATURE GRANULOCYTE COUNT: 0.03 X10(3) UL (ref 0–1)
IMMATURE GRANULOCYTE RATIO %: 0.4 %
LYMPHOCYTES # BLD AUTO: 1.3 X10(3) UL (ref 0.9–4)
LYMPHOCYTES NFR BLD AUTO: 18.5 %
MCH RBC QN AUTO: 29.9 PG (ref 27–33.2)
MCHC RBC AUTO-ENTMCNC: 32.9 G/DL (ref 31–37)
MCV RBC AUTO: 91.1 FL (ref 80–99)
MONOCYTES # BLD AUTO: 0.44 X10(3) UL (ref 0.1–1)
MONOCYTES NFR BLD AUTO: 6.3 %
NEUTROPHIL ABS PRELIM: 4.94 X10 (3) UL (ref 1.3–6.7)
NEUTROPHILS # BLD AUTO: 4.94 X10(3) UL (ref 1.3–6.7)
NEUTROPHILS NFR BLD AUTO: 70.1 %
P AXIS: 41 DEGREES
P AXIS: 76 DEGREES
P-R INTERVAL: 178 MS
P-R INTERVAL: 184 MS
PLATELET # BLD AUTO: 175 10(3)UL (ref 150–450)
POTASSIUM SERPL-SCNC: 4.9 MMOL/L (ref 3.6–5.1)
Q-T INTERVAL: 326 MS
Q-T INTERVAL: 416 MS
QRS DURATION: 84 MS
QRS DURATION: 86 MS
QTC CALCULATION (BEZET): 407 MS
QTC CALCULATION (BEZET): 491 MS
R AXIS: 59 DEGREES
R AXIS: 63 DEGREES
RBC # BLD AUTO: 3.14 X10(6)UL (ref 3.8–5.8)
RED CELL DISTRIBUTION WIDTH-SD: 44.7 FL (ref 35.1–46.3)
SODIUM SERPL-SCNC: 144 MMOL/L (ref 136–144)
T AXIS: 102 DEGREES
T AXIS: 79 DEGREES
VENTRICULAR RATE: 84 BPM
VENTRICULAR RATE: 94 BPM
WBC # BLD AUTO: 7 X10(3) UL (ref 4–13)

## 2018-06-15 PROCEDURE — 99232 SBSQ HOSP IP/OBS MODERATE 35: CPT | Performed by: INTERNAL MEDICINE

## 2018-06-15 RX ORDER — ALFUZOSIN HYDROCHLORIDE 10 MG/1
10 TABLET, EXTENDED RELEASE ORAL
Status: DISCONTINUED | OUTPATIENT
Start: 2018-06-15 | End: 2018-07-07

## 2018-06-15 RX ORDER — NITROGLYCERIN 2.5 MG/D
1 PATCH TRANSDERMAL DAILY
Status: DISCONTINUED | OUTPATIENT
Start: 2018-06-15 | End: 2018-06-16

## 2018-06-15 RX ORDER — METOPROLOL TARTRATE 50 MG/1
50 TABLET, FILM COATED ORAL
Status: DISCONTINUED | OUTPATIENT
Start: 2018-06-15 | End: 2018-06-16

## 2018-06-15 NOTE — PROGRESS NOTES
[facility] Cardiology Progress Note    Subjective: Had to self cath more frequently during night, and one episode of CP after up lasting just a couple of minutes, similar to what he has been experiencing at home.      Objective:  /68 (BP Location: Left symptoms persist, consider cardiac eval prior to urologic surgery. These thoughts were discussed with anesthesia and after reviewing Dr. Kristal Tan office note/H&P.   Janel Pratt MD

## 2018-06-15 NOTE — INTERVAL H&P NOTE
Pre-op Diagnosis: Moderate to severe left hydronephrosis    The above referenced H&P was reviewed by Mila Sutton MD on 6/15/2018, the patient was examined and no significant changes have occurred in the patient's condition since the H&P was performed.   I d extract stone, and persistent ETHAN on CKD discussed as well    Left side marked

## 2018-06-15 NOTE — PROGRESS NOTES
In preop holding. Discussed with patient, daughter, anesthesia team.  They would like to hold off on the risks of general anesthesia.   I reviewed again with patient and daughter that I am not recommending any intervention to his RK where there is the large

## 2018-06-15 NOTE — OR NURSING
1650: Patient discharged from [address] back to room with transport. Patient remained in stable condition while in holding area. Anesthesia and Urologist gave long explanations for cancellation of case to patient and daughter.  Patient expressed Sri

## 2018-06-15 NOTE — PROGRESS NOTES
[facility]  Urology Progress Note    Leonel Fulton Patient Status:  Inpatient    10/3/1927 MRN [id number]   [facility] 2NE-A Attending Gerard José MD   Hosp Day # 2 PCP Lino Acre MD [facility]     Subjective:  [facility]  is a(n) resume CIC    Above discussed with nurse and Mick Shaw (cardiology APN).     Gilberto Gamez P.A.-C  [facility] Urology  6/15/2018  10:07 AM

## 2018-06-15 NOTE — PROGRESS NOTES
[facility]  Nephrology Progress Note    [address] [facility]  Patient Status:  Inpatient    10/3/1927 MRN [id number]   [facility] 2NE-A Attending Darrin Salcedo MD   Hosp Day # 2 PCP Glo Miyamoto MD [facility]       SUBJECTIVE:  Fatima placed ove GLU  118*  98  102*  99       Recent Labs   Lab  06/11/18   1234  06/14/18   0453   ALT  13*  13*   AST  12*  12*   ALB  3.4*  2.9*       Renal u/s noted      Meds:     Current Facility-Administered Medications:  metoprolol Tartrate (LOPRESSOR) tab 25 mg

## 2018-06-15 NOTE — H&P (VIEW-ONLY)
[facility] Urology   Consultation Note    [address] Patient Status:  Inpatient    10/3/1927 MRN [id number]   [facility] 2NE-A Attending Brooke Dozier MD   Hosp Day # 1 PCP Naomi Bullock MD [facility]     Reason for Consultation: DISEASE     prostate CA: straight caths   • Hearing impairment    • Hearing loss    • Hypotension    • Indigestion    • Neuropathy    • Osteoarthritis    • Osteoporosis    • Pain in joints    • PERIPHERAL VASCULAR DISEASE 2007    left leg bypass sx   • Per abdomen is soft and nontender without rebound or guarding. The bladder is non-palpable. BACK: Without CVA tenderness. SKIN: Without stigmata. NEUROLOGIC: Grossly intact. EXTREMITIES: Without edema.       Laboratory Data:    Lab Results  Component V NODES:  No adenopathy. PELVIC ORGANS:  Pelvic organs appropriate for patient age. BONES:  Stable including degenerative change and multiple sclerotic foci. LUNG BASES:  Pleural-parenchymal scarring.       =====  CONCLUSION:    1.  Diverticular dise Aspiration pneumonia ([facility] [address]     Acute kidney injury ([facility] [address]     Renal insufficiency     Urinary tract infection     Staphylococcal pneumonia (HCC)     Vomiting     Dehydration     Diarrhea     GARCIA (dyspnea on exertion)     ETHAN (acute kidney injury) ([address]

## 2018-06-15 NOTE — HOME CARE LIAISON
Referral received from Trey. Per Trey, patient scheduled to have cath procedure on Monday. Will follow up after patient has cath on Monday.   Thank you for the referral

## 2018-06-16 LAB
ATRIAL RATE: 69 BPM
BUN BLD-MCNC: 37 MG/DL (ref 8–20)
CALCIUM BLD-MCNC: 7.7 MG/DL (ref 8.3–10.3)
CHLORIDE: 119 MMOL/L (ref 101–111)
CO2: 19 MMOL/L (ref 22–32)
CREAT BLD-MCNC: 3.21 MG/DL (ref 0.7–1.3)
GLUCOSE BLD-MCNC: 101 MG/DL (ref 70–99)
GLUCOSE BLD-MCNC: 150 MG/DL (ref 65–99)
P AXIS: 59 DEGREES
P-R INTERVAL: 196 MS
POTASSIUM SERPL-SCNC: 4.8 MMOL/L (ref 3.6–5.1)
Q-T INTERVAL: 430 MS
QRS DURATION: 78 MS
QTC CALCULATION (BEZET): 460 MS
R AXIS: 70 DEGREES
SODIUM SERPL-SCNC: 145 MMOL/L (ref 136–144)
T AXIS: 81 DEGREES
VENTRICULAR RATE: 69 BPM

## 2018-06-16 PROCEDURE — 99232 SBSQ HOSP IP/OBS MODERATE 35: CPT | Performed by: INTERNAL MEDICINE

## 2018-06-16 RX ORDER — SODIUM CHLORIDE 9 MG/ML
INJECTION, SOLUTION INTRAVENOUS CONTINUOUS
Status: DISCONTINUED | OUTPATIENT
Start: 2018-06-16 | End: 2018-06-18

## 2018-06-16 RX ORDER — NITROGLYCERIN 2.5 MG/D
1 PATCH TRANSDERMAL DAILY
Status: DISCONTINUED | OUTPATIENT
Start: 2018-06-17 | End: 2018-06-28

## 2018-06-16 NOTE — CONSULTS
[facility] dosing service    Initial Pharmacokinetic Consult for Vancomycin Dosing     Timo Navarro is a 80year old male admitted on 6/13 who is being treated for UTI. Pharmacy has been asked to dose Vancomycin by Dr. Kwaku Castillo.     He is allergic to d function, and pharmacokinetics. 2.  Pharmacy ordered Vancomycin trough level(s) prior to 3rd dose. Goal trough level 10-15 ug/mL. 3.  Pharmacy will need BUN/Scr daily while on Vancomycin to assess renal function.     4.  Pharmacy will follow and mon

## 2018-06-16 NOTE — PLAN OF CARE
Patient up ambulating in hallway with PCT. Patient became dizzy and fatigued and needed to sit after ambulating 25 feet. Patient sitting in chair and became unresponsive, nonverbal with eyes open and glazed over.  Patient unable to follow commands, RRT call

## 2018-06-16 NOTE — PROGRESS NOTES
[facility]  Nephrology Progress Note    [facility]  Patient Status:  Inpatient    10/3/1927 MRN [id number]   [facility] 2NE-A Attending Quintin Hoyt MD   Hosp Day # 3 PCP Vila King MD [facility]       SUBJECTIVE:  Cysto cancelled 39*  37*  34*  37*   CREATSERUM  3.30*  3.11*  2.74*  2.70*  3.21*   CA  9.1  9.1  8.2*  7.8*  7.7*   GLU  118*  98  102*  99  101*       Recent Labs   Lab  06/11/18   1234  06/14/18   0453   ALT  13*  13*   AST  12*  12*   ALB  3.4*  2.9*       Renal u/s PM

## 2018-06-16 NOTE — PROGRESS NOTES
[facility]  Progress Note    [address] Patient Status:  Inpatient    10/3/1927 MRN [id number]   [facility] 2NE-A Attending Victory Goodell, MD   Hosp Day # 3 PCP Marion Drain MD [facility]     Subjective:  [address] is a(n) 89 year 06/13/2018         Lab Results  Component Value Date   URINECUL 10,000 - 50,000 CFU/ML Staphylococcus aureus, MRSA (A) 06/14/2018   URINECUL 50,000 CFU/ML Staphylococcus aureus, MRSA (A) 02/17/2017   URINECUL <10,000 CFU/ML Mixed gram positive celsa 02/17/ aspirin  They are willing to proceed with L NT placement on Tuesday pending any further acute changes to his ETHAN.  This would allow for LK decompression for any obstructive element of this ETHAN to improve so that IV contrast admi for cardiac catheterization

## 2018-06-16 NOTE — PLAN OF CARE
Patient is alert and oriented. Patient denies pain or discomfort. Patient up to chair this afternoon and up ambulating in hallway again with one assist, cane and chair following.  Patient able to ambulate 25 feet, became \"winded\" but not dizzy and sat rimma

## 2018-06-16 NOTE — PROGRESS NOTES
[facility] Cardiology Progress Note    Subjective:  Frustrated by inability to have urologic surgery. No further chest pain. Not up during night with indwelling catheter, slept well.      Objective:  BP 95/62 (BP Location: Left arm)   Pulse 84   Temp 98.3 °F (

## 2018-06-17 LAB
BUN BLD-MCNC: 39 MG/DL (ref 8–20)
CALCIUM BLD-MCNC: 7.3 MG/DL (ref 8.3–10.3)
CHLORIDE: 118 MMOL/L (ref 101–111)
CO2: 19 MMOL/L (ref 22–32)
CREAT BLD-MCNC: 3.31 MG/DL (ref 0.7–1.3)
GLUCOSE BLD-MCNC: 96 MG/DL (ref 70–99)
POTASSIUM SERPL-SCNC: 4.6 MMOL/L (ref 3.6–5.1)
SODIUM SERPL-SCNC: 144 MMOL/L (ref 136–144)

## 2018-06-17 PROCEDURE — 99232 SBSQ HOSP IP/OBS MODERATE 35: CPT | Performed by: INTERNAL MEDICINE

## 2018-06-17 NOTE — PROGRESS NOTES
[facility]  Progress Note    Serena [employer] Patient Status:  Inpatient    10/3/1927 MRN [id number]   [facility] 2NE-A Attending Missy Hardy MD   Hosp Day # 4 PCP Ladene Hake MD [facility]     Subjective:  Serena Foods is a(n) 80 ye Coronary artery disease     Aspiration pneumonia of lower lobe (HCC)     Urinary tract infection without hematuria     Urinary tract infection without hematuria, site unspecified     Nausea and vomiting in adult     At risk for falling     Syncope and susan

## 2018-06-17 NOTE — PROGRESS NOTES
[facility]  Nephrology Progress Note    [facility] Patient Status:  Inpatient    10/3/1927 MRN [id number]   [facility] 2NE-A Attending Dick Ballard MD   Hosp Day # 4 PCP Deeann Raven SR MD [facility]       SUBJECTIVE:  SOB earlier toda 39*   CREATSERUM  3.11*  2.74*  2.70*  3.21*  3.31*   CA  9.1  8.2*  7.8*  7.7*  7.3*   GLU  98  102*  99  101*  96       Recent Labs   Lab  06/11/18   1234  06/14/18   0453   ALT  13*  13*   AST  12*  12*   ALB  3.4*  2.9*       Renal u/s noted      Meds Hypotension- will cont IVF- 0.9 NS at 100    Questions/concerns were discussed with patient and/or family by bedside.         Ventura Kramer MD  6/17/2018  1:29 PM

## 2018-06-18 ENCOUNTER — APPOINTMENT (OUTPATIENT)
Dept: INTERVENTIONAL RADIOLOGY/VASCULAR | Facility: HOSPITAL | Age: 83
DRG: 246 | End: 2018-06-18
Attending: UROLOGY
Payer: MEDICARE

## 2018-06-18 LAB
ATRIAL RATE: 99 BPM
BUN BLD-MCNC: 36 MG/DL (ref 8–20)
CALCIUM BLD-MCNC: 7.1 MG/DL (ref 8.3–10.3)
CHLORIDE: 119 MMOL/L (ref 101–111)
CO2: 17 MMOL/L (ref 22–32)
CREAT BLD-MCNC: 3.13 MG/DL (ref 0.7–1.3)
CREAT BLD-MCNC: 3.13 MG/DL (ref 0.7–1.3)
GLUCOSE BLD-MCNC: 103 MG/DL (ref 70–99)
GLUCOSE BLD-MCNC: 119 MG/DL (ref 65–99)
INR BLD: 1.11 (ref 0.9–1.1)
P AXIS: 71 DEGREES
P-R INTERVAL: 182 MS
POTASSIUM SERPL-SCNC: 5.5 MMOL/L (ref 3.6–5.1)
PSA SERPL DL<=0.01 NG/ML-MCNC: 14.8 SECONDS (ref 12.4–14.7)
Q-T INTERVAL: 376 MS
QRS DURATION: 84 MS
QTC CALCULATION (BEZET): 482 MS
R AXIS: 40 DEGREES
SODIUM SERPL-SCNC: 145 MMOL/L (ref 136–144)
T AXIS: 94 DEGREES
VENTRICULAR RATE: 99 BPM

## 2018-06-18 PROCEDURE — 0T9130Z DRAINAGE OF LEFT KIDNEY WITH DRAINAGE DEVICE, PERCUTANEOUS APPROACH: ICD-10-PCS | Performed by: RADIOLOGY

## 2018-06-18 PROCEDURE — 99232 SBSQ HOSP IP/OBS MODERATE 35: CPT | Performed by: INTERNAL MEDICINE

## 2018-06-18 PROCEDURE — B547ZZA ULTRASONOGRAPHY OF LEFT SUBCLAVIAN VEIN, GUIDANCE: ICD-10-PCS | Performed by: INTERNAL MEDICINE

## 2018-06-18 PROCEDURE — 05H633Z INSERTION OF INFUSION DEVICE INTO LEFT SUBCLAVIAN VEIN, PERCUTANEOUS APPROACH: ICD-10-PCS | Performed by: INTERNAL MEDICINE

## 2018-06-18 PROCEDURE — BT121ZZ FLUOROSCOPY OF LEFT KIDNEY USING LOW OSMOLAR CONTRAST: ICD-10-PCS | Performed by: RADIOLOGY

## 2018-06-18 RX ORDER — LIDOCAINE HYDROCHLORIDE 10 MG/ML
INJECTION, SOLUTION EPIDURAL; INFILTRATION; INTRACAUDAL; PERINEURAL
Status: COMPLETED
Start: 2018-06-18 | End: 2018-06-18

## 2018-06-18 RX ORDER — MIDAZOLAM HYDROCHLORIDE 1 MG/ML
INJECTION INTRAMUSCULAR; INTRAVENOUS
Status: DISCONTINUED
Start: 2018-06-18 | End: 2018-06-18 | Stop reason: WASHOUT

## 2018-06-18 RX ORDER — SODIUM CHLORIDE 0.9 % (FLUSH) 0.9 %
10 SYRINGE (ML) INJECTION EVERY 12 HOURS
Status: DISCONTINUED | OUTPATIENT
Start: 2018-06-18 | End: 2018-06-30

## 2018-06-18 NOTE — PROGRESS NOTES
[facility]  Cardiology Progress Note    Ly Fulton Patient Status:  Inpatient    10/3/1927 MRN [id number]   [facility] 2NE-A Attending Victory Goodell, MD   Hosp Day # 5 PCP Marion Drain MD [place]     Subjective:  Patient complainin this am. Last dose Plavix 6/15/18. · ETHAN/CKD, new L hydronephrosis with 2 cm stone, OR canceled by anesthesia due to cardiac concerns. CR the same. Plan now for nephrostomy tube, likely Tuesday. IR will not do today due to chest pain.    · PAD, hx LE bypa

## 2018-06-18 NOTE — HOME CARE LIAISON
Referral received from Trey. Met with patient at bedside to discuss home health care services. Patient agreeable to [facility] and PT services but \"wanted to run it by his daughter\", Regulo Teran, to make sure daughter is agreeable.   Call

## 2018-06-18 NOTE — PROGRESS NOTES
[facility]  Nephrology Progress Note    [address] Patient Status:  Inpatient    10/3/1927 MRN [id number]   [facility] 2NE-A Attending Yg Foy MD   Hosp Day # 5 PCP Anell Prader MD [facility]       SUBJECTIVE:  Cont to c/o inte 37*  34*  37*  39*  36*   CREATSERUM  2.74*  2.70*  3.21*  3.31*  3.13*  3.13*   CA  8.2*  7.8*  7.7*  7.3*  7.1*   GLU  102*  99  101*  96  103*       Recent Labs   Lab  06/11/18   1234  06/14/18   0453   ALT  13*  13*   AST  12*  12*   ALB  3.4*  2.9* Felicity Riley MD  6/18/2018  11:52 AM

## 2018-06-18 NOTE — PLAN OF CARE
Per pt chest pain on and off, 3/10  Scale 0 to 10   bp 105/68,   Per pt more sob today, O2 sats 96% on room air  2L O2 applied and O2 sats up to 100%   Nitro dur patch given and Metoprolol 25mg given today  Sharee LOVING at bedside

## 2018-06-18 NOTE — PLAN OF CARE
Received a call from [facility] for NT today  Npo maintained ,pt eaten breakfast  Pt still to sign consent, per IR can sign consent there

## 2018-06-18 NOTE — PROGRESS NOTES
[facility]    Progress Note    [address] Patient Status:  Inpatient    10/3/1927 MRN [id number]   [facility] 2NE-A Attending Ann Hill MD   Hosp Day # 5 PCP Fatima Knack MD [facility]       Subjective:   [address] is a(n) 89 10/19/2017    10/01/2013   CKMB 3.4 09/30/2013   JANEEN NEGATIVE 03/05/2012   POCGLU 124 (H) 10/15/2013           Ekg 12-lead    Result Date: 6/16/2018  Sinus rhythm with marked sinus arrythmia Septal infarct , age undetermined ST & T wave abnormality,

## 2018-06-18 NOTE — PLAN OF CARE
1735 pt back from IR s/p left Nephrostomy tube insertion  Incisions d/i, NT attached to bag draining,pinkish,red drainage noted output  Pt denies any pain, slightly drowsy  Back to 2l/nc sats 98%  NT output 130cc

## 2018-06-18 NOTE — PHYSICAL THERAPY NOTE
Attempted PT evaluation, RN requesting to hold, as pt going for procedure. Will continue to follow and see when appropriate.

## 2018-06-19 ENCOUNTER — APPOINTMENT (OUTPATIENT)
Dept: GENERAL RADIOLOGY | Facility: HOSPITAL | Age: 83
DRG: 246 | End: 2018-06-19
Attending: NURSE PRACTITIONER
Payer: MEDICARE

## 2018-06-19 LAB
ALBUMIN SERPL-MCNC: 2.6 G/DL (ref 3.5–4.8)
ALLENS TEST: POSITIVE
ALP LIVER SERPL-CCNC: 61 U/L (ref 45–117)
ALT SERPL-CCNC: 22 U/L (ref 17–63)
ARTERIAL BLD GAS O2 SATURATION: 96 % (ref 92–100)
ARTERIAL BLOOD GAS BASE EXCESS: -12.9
ARTERIAL BLOOD GAS HCO3: 12 MEQ/L (ref 22–26)
ARTERIAL BLOOD GAS PCO2: 25 MM HG (ref 35–45)
ARTERIAL BLOOD GAS PH: 7.3 (ref 7.35–7.45)
ARTERIAL BLOOD GAS PO2: 97 MM HG (ref 80–105)
AST SERPL-CCNC: 69 U/L (ref 15–41)
BILIRUB SERPL-MCNC: 0.3 MG/DL (ref 0.1–2)
BUN BLD-MCNC: 35 MG/DL (ref 8–20)
CALCIUM BLD-MCNC: 6.9 MG/DL (ref 8.3–10.3)
CALCULATED O2 SATURATION: 96 % (ref 92–100)
CARBOXYHEMOGLOBIN: 1.2 % SAT (ref 0–3)
CHLORIDE: 118 MMOL/L (ref 101–111)
CO2: 17 MMOL/L (ref 22–32)
CREAT BLD-MCNC: 3.11 MG/DL (ref 0.7–1.3)
GLUCOSE BLD-MCNC: 109 MG/DL (ref 70–99)
L/M: 2 L/MIN
M PROTEIN MFR SERPL ELPH: 6.4 G/DL (ref 6.1–8.3)
METHEMOGLOBIN: 0.7 % SAT (ref 0.4–1.5)
P/F RATIO: 466.9 MMHG
PATIENT TEMPERATURE: 98.4 F
POTASSIUM SERPL-SCNC: 4.7 MMOL/L (ref 3.6–5.1)
POTASSIUM SERPL-SCNC: 5.7 MMOL/L (ref 3.6–5.1)
PROCALCITONIN SERPL-MCNC: 0.13 NG/ML
SODIUM SERPL-SCNC: 145 MMOL/L (ref 136–144)
TOTAL HEMOGLOBIN: 9.3 G/DL (ref 12.6–17.4)

## 2018-06-19 PROCEDURE — 99222 1ST HOSP IP/OBS MODERATE 55: CPT | Performed by: INTERNAL MEDICINE

## 2018-06-19 PROCEDURE — 99232 SBSQ HOSP IP/OBS MODERATE 35: CPT | Performed by: INTERNAL MEDICINE

## 2018-06-19 PROCEDURE — 71045 X-RAY EXAM CHEST 1 VIEW: CPT | Performed by: NURSE PRACTITIONER

## 2018-06-19 RX ORDER — FUROSEMIDE 10 MG/ML
80 INJECTION INTRAMUSCULAR; INTRAVENOUS
Status: DISCONTINUED | OUTPATIENT
Start: 2018-06-19 | End: 2018-06-21

## 2018-06-19 RX ORDER — ALBUTEROL SULFATE 2.5 MG/3ML
2.5 SOLUTION RESPIRATORY (INHALATION)
Status: DISCONTINUED | OUTPATIENT
Start: 2018-06-19 | End: 2018-06-19

## 2018-06-19 RX ORDER — FUROSEMIDE 10 MG/ML
80 INJECTION INTRAMUSCULAR; INTRAVENOUS ONCE
Status: DISCONTINUED | OUTPATIENT
Start: 2018-06-19 | End: 2018-06-19

## 2018-06-19 RX ORDER — ALBUTEROL SULFATE 2.5 MG/3ML
2.5 SOLUTION RESPIRATORY (INHALATION) EVERY 6 HOURS PRN
Status: DISCONTINUED | OUTPATIENT
Start: 2018-06-19 | End: 2018-06-19

## 2018-06-19 RX ORDER — ALBUTEROL SULFATE 2.5 MG/3ML
2.5 SOLUTION RESPIRATORY (INHALATION)
Status: DISCONTINUED | OUTPATIENT
Start: 2018-06-19 | End: 2018-06-21

## 2018-06-19 NOTE — CONSULTS
[facility]  Report of Consultation    [facility]  Patient Status:  Inpatient    10/3/1927 MRN [id number]   [facility] 2NE-A Attending Arnel Pettit MD   Hosp Day # 6 PCP Jameel Velasquez MD [facility]     Reason for Consultation: Joshua Merritt of breath which he attributes to that condition.     PAST HISTORY:    · Coronary artery disease with a history of stenting in the past  · Metastatic prostate CA for which she receives therapy including Lupron injections  · Distant history of lymphoma  · Acu Carbonate-Vitamin D (OSCAL-500) 500-200 MG-UNIT Oral Tab Take 1 tablet by mouth daily. Disp:  Rfl:  6/13/2018 at Unknown time   Leuprolide Acetate (LUPRON IJ) Inject 1 Dose as directed every 6 (six) months.    Disp:  Rfl:  Past Month at Unknown time   darren focal deficits    Lab Data Review:  Recent Labs   Lab  06/14/18   0453  06/15/18   0547   RBC  3.00*  3.14*   HGB  8.8*  9.4*   HCT  27.4*  28.6*   MCV  91.3  91.1   MCH  29.3  29.9   MCHC  32.1  32.9   RDW  13.4  13.4   NEPRELIM   --   4.94   WBC  6.8  7.

## 2018-06-19 NOTE — PROGRESS NOTES
[facility]  Urology Progress Note    Adriana Fulton Patient Status:  Inpatient    10/3/1927 MRN [id number]   [facility] 2NE-A Attending Evangelina Walton MD   Hosp Day # 6 PCP Roxana Sood MD [facility]     Subjective:  5 [facility]  is a(n)

## 2018-06-19 NOTE — PROGRESS NOTES
[facility]  Nephrology Progress Note    Jeni Fulton Patient Status:  Inpatient    10/3/1927 MRN [id number]   [facility] 2NE-A Attending Michel Izaguirre MD   Hosp Day # 6 PCP Suzanne Riley MD [facility]       SUBJECTIVE:  Has nephrostomy 17.0*   BUN  34*  37*  39*  36*  35*   CREATSERUM  2.70*  3.21*  3.31*  3.13*  3.13*  3.11*   CA  7.8*  7.7*  7.3*  7.1*  6.9*   GLU  99  101*  96  103*  109*       Recent Labs   Lab  06/14/18   0453  06/19/18   0641   ALT  13*  22   AST  12*  69*   ALB  2 association with ongoing ETHAN. IV lasx today and will follow    #5. UTI- finish course of vanco.  TMP/SMX stopped due to worsening creat    #6. SOB- CXR noted.  IV lasix this AM and to cont for 4 doses    Questions/concerns were discussed with patient an

## 2018-06-19 NOTE — PROGRESS NOTES
[facility]  Cardiology Progress Note    [facility] Patient Status:  Inpatient    10/3/1927 MRN [id number]   [facility] 2NE-A Attending Elinor York MD   Hosp Day # 6 PCP Josephina Poles MD [facility]     Subjective:  Summoned by Rob Buchanan cancer    Plan:   1. Continue ASA, BB, statin. Ok to resume plavix now that patient has nephrostomy tube  2. Plans for cardiac cath when creatinine improved.    3. Chest x ray, ABG, neb, pulmonary consult    INDER Tinajero  6/19/2018  9:13 AM  Pt seen

## 2018-06-19 NOTE — CONSULTS
[address] Patient Status:  Inpatient    10/3/1927 MRN [id number]   [facility] 2NE-A Attending Brooke Dozier MD   Hosp Day # 6 PCP Naomi Bullock MD [facility]     Reason for consult: medical management ANGIO*  No date: OTHER SURGICAL HISTORY      Comment: left leg bypass    Social History:  reports that he has never smoked. He has never used smokeless tobacco. He reports that he does not drink alcohol or use drugs.     Family History:   Family History   P bruits. Respiratory: decreased BS  Cardiovascular: S1, S2. Regular rate and rhythm. No murmurs, rubs or gallops. Equal pulses. Chest and Back: No tenderness or deformity. Abdomen: Soft, nontender, nondistended. Positive bowel sounds.  No rebound, guard diuresis  3. CXR with CHF and ? pna  4. Check PCT, afebrile and normal WBC, already on Vanc  3. Acute on Chronic CKD 3  1. Likely due obtructive uropathy  2. Monitor renal function   3. Renal following  4. L Hydronephrosis  1. S/P nephrostomy 6/18  2.  Geena

## 2018-06-20 ENCOUNTER — APPOINTMENT (OUTPATIENT)
Dept: CV DIAGNOSTICS | Facility: HOSPITAL | Age: 83
DRG: 246 | End: 2018-06-20
Attending: INTERNAL MEDICINE
Payer: MEDICARE

## 2018-06-20 LAB
BUN BLD-MCNC: 40 MG/DL (ref 8–20)
CALCIUM BLD-MCNC: 7.1 MG/DL (ref 8.3–10.3)
CHLORIDE: 120 MMOL/L (ref 101–111)
CO2: 19 MMOL/L (ref 22–32)
CREAT BLD-MCNC: 3.35 MG/DL (ref 0.7–1.3)
CREAT BLD-MCNC: 3.35 MG/DL (ref 0.7–1.3)
GLUCOSE BLD-MCNC: 105 MG/DL (ref 70–99)
POTASSIUM SERPL-SCNC: 4.7 MMOL/L (ref 3.6–5.1)
SODIUM SERPL-SCNC: 145 MMOL/L (ref 136–144)
VANCOMYCIN TROUGH: 14.4 UG/ML (ref 10–20)

## 2018-06-20 PROCEDURE — 99232 SBSQ HOSP IP/OBS MODERATE 35: CPT | Performed by: INTERNAL MEDICINE

## 2018-06-20 PROCEDURE — B546ZZA ULTRASONOGRAPHY OF RIGHT SUBCLAVIAN VEIN, GUIDANCE: ICD-10-PCS | Performed by: INTERNAL MEDICINE

## 2018-06-20 PROCEDURE — 93306 TTE W/DOPPLER COMPLETE: CPT | Performed by: INTERNAL MEDICINE

## 2018-06-20 PROCEDURE — 05H533Z INSERTION OF INFUSION DEVICE INTO RIGHT SUBCLAVIAN VEIN, PERCUTANEOUS APPROACH: ICD-10-PCS | Performed by: INTERNAL MEDICINE

## 2018-06-20 RX ORDER — ACETAMINOPHEN 500 MG
500 TABLET ORAL EVERY 6 HOURS PRN
Status: DISCONTINUED | OUTPATIENT
Start: 2018-06-20 | End: 2018-07-07

## 2018-06-20 RX ORDER — SODIUM BICARBONATE 325 MG/1
1300 TABLET ORAL 2 TIMES DAILY
Status: DISCONTINUED | OUTPATIENT
Start: 2018-06-20 | End: 2018-06-29

## 2018-06-20 NOTE — CM/SW NOTE
PT now recommending home health, patient was already accepted by [facility] for nursing, will obtain orders for pt as well, liaison updated.        06/20/18 1056   Discharge disposition   Expected discharge disposition Home-Health   Name of Facillity/Ho

## 2018-06-20 NOTE — PROGRESS NOTES
[facility] dosing service    Follow-up Pharmacokinetic Consult for Vancomycin Dosing     Carmen Kramer is a 80year old male  being treated for UTI. Patient is on day 5 of Vancomycin 1.25 gm IV Q 48 hours. Goal trough is 10-15 ug/mL.     He is allergic monitor renal function changes, toxicity and efficacy.     Deyvi Diaz PharmD  6/20/2018  3:24 PM  [address] [facility]: [phone]

## 2018-06-20 NOTE — PLAN OF CARE
Problem: Patient/Family Goals  Goal: Patient/Family Long Term Goal  Patient's Long Term Goal: To have cath    Interventions:  -Collaborate with patient, family & interdisciplinary team regarding plan of care  -Monitor labs, tele, VS  -Follow pre-cath proce therapy as ordered   Outcome: Progressing  stable    Problem: METABOLIC/FLUID AND ELECTROLYTES - ADULT  Goal: Electrolytes maintained within normal limits  INTERVENTIONS:  - Monitor labs and rhythm and assess patient for signs and symptoms of electrolyte i Progressing  Dyspnea on exertion noted with ambulation from bathroom to chair    Problem: SKIN/TISSUE INTEGRITY - ADULT  Goal: Skin integrity remains intact  INTERVENTIONS  - Assess and document risk factors for pressure ulcer development  - Assess and doc

## 2018-06-20 NOTE — PLAN OF CARE
Tolerating meals, sitting up in bed. BP 82.64, denies feeling lightheaded or any other symptoms at this time.   Lab called for Vanco trough at 1430

## 2018-06-20 NOTE — PHYSICAL THERAPY NOTE
PHYSICAL THERAPY EVALUATION - INPATIENT     Room Number: 4452/5153-L  Evaluation Date: 6/20/2018  Type of Evaluation: Initial  Physician Order: PT Eval and Treat    Presenting Problem: Chest pain  Reason for Therapy: Mobility Dysfunction and Discharge Pl GENITO-URINARY DISEASE     prostate CA: straight caths   • Hearing impairment    • Hearing loss    • Hypotension    • Indigestion    • Neuropathy    • Osteoarthritis    • Osteoporosis    • Pain in joints    • PERIPHERAL VASCULAR DISEASE 2007    left leg by difficulty  · Initiation: appears intact    RANGE OF MOTION AND STRENGTH ASSESSMENT  RUE ASSESSMENT   RUE AROM  R Shoulder Flexion: WFL - Within Functional Limits   R Elbow Flexion/Extension: WFL - Within Functional Limits   R Wrist Flexion: WFL - Within F adjusting bedclothes, sheets and blankets)?: None   -   Sitting down on and standing up from a chair with arms (e.g., wheelchair, bedside commode, etc.): A Little   -   Moving from lying on back to sitting on the side of the bed?: None   How much help from manager/    ASSESSMENT   Patient is a 80year old male admitted on 6/13/2018 for chest pain and SOB.   In this PT evaluation, the patient presents with the following impairments: 1) impaired cardiopulmonary endurance and 2) impaired static and

## 2018-06-20 NOTE — PROGRESS NOTES
[facility]  Progress Note     5 [facility]  Patient Status:  Inpatient    10/3/1927 MRN [id number]   [facility] 2NE-A Attending Robbie Valdivia MD   Hosp Day # 7 PCP Judy Marroquin MD [facility]     Chief Complaint: chest pain    S: P --    AST  12*   --    --   69*   --    --    ALT  13*   --    --   22   --    --    BILT  0.2   --    --   0.3   --    --    TP  6.7   --    --   6.4   --    --     < > = values in this interval not displayed.        Estimated Creatinine Clearance: 15.1 mL with patient, RN, Renal    Chery Bazan MD

## 2018-06-20 NOTE — PROGRESS NOTES
[facility]  Urology Progress Note    [facility] Patient Status:  Inpatient    10/3/1927 MRN [id number]   [facility] 2NE-A Attending Brooke Dozier MD   Hosp Day # 7 PCP Tiana Self MD [facility]     Subjective:  5 [facility]  is a(n)

## 2018-06-20 NOTE — PROGRESS NOTES
[facility] Pulmonary/Critical Care Progress Note     SUBJECTIVE/24H Events: All events, procedures, notes reviewed. No acute events, he feels better today, dyspnea improved. Denies any other complaints. No f/c/s.  No cou 145*   --   145*   K  5.5*  5.7*  4.7  4.7   CL  119*  118*   --   120*   CO2  17.0*  17.0*   --   19.0*     Recent Labs   Lab  06/14/18   0453  06/15/18   0547   RBC  3.00*  3.14*   HGB  8.8*  9.4*   HCT  27.4*  28.6*   MCV  91.3  91.1   MCH  29.3  29.9 albuterol sulfate  2.5 mg Nebulization QID   • Normal Saline Flush  10 mL Intravenous Q12H   • vancomycin  15 mg/kg Intravenous Q48H   • metoprolol tartrate  25 mg Oral 2x Daily(Beta Blocker)   • nitroGLYCERIN  1 patch Transdermal Daily   • Alfuzosin HCl E

## 2018-06-20 NOTE — PROGRESS NOTES
[facility]  Nephrology Progress Note    [address] Patient Status:  Inpatient    10/3/1927 MRN [id number]   [facility] 2NE-A Attending Tila Roberts MD   Hosp Day # 7 PCP Marjorie Levin SR MD [facility]       SUBJECTIVE:    Feels better t 120*   CO2  19.0*  19.0*  17.0*  17.0*   --   19.0*   BUN  37*  39*  36*  35*   --   40*   CREATSERUM  3.21*  3.31*  3.13*  3.13*  3.11*   --   3.35*  3.35*   CA  7.7*  7.3*  7.1*  6.9*   --   7.1*   GLU  101*  96  103*  109*   --   105*       Recent Labs W/u ongoing per cards, angiogram pending improvement in renal fxn although with ongoing issues with SOB/CP may need to proceed. Risk of clinically sig ETHAN would be on the order of 10% or so with need for HD closer to 1-5%    #4.   Acidosis- metabolic in as

## 2018-06-20 NOTE — PROGRESS NOTES
[facility]  Cardiology Progress Note    Subjective:  Episode of SOB yesterday with no recurrence.  No CP    Objective:  BP 91/58 (BP Location: Right arm)   Pulse 89   Temp 98.2 °F (36.8 °C) (Oral)   Resp 20   Wt 185 lb 10 oz (84.2 kg)   SpO2 98%   BMI due to cardiac concerns. S/P left nephrostomy tube 6/8/9. Cr remains elevated today. · PAD, hx LE bypass  · Dyslipidemia  · HTN  · Hx prostate cancer     Plan:  · Ok to resume plavix  · Continue IV diuresis, furosemide 80mg BID IV x4 doses.   · Plans for c

## 2018-06-21 ENCOUNTER — APPOINTMENT (OUTPATIENT)
Dept: ULTRASOUND IMAGING | Facility: HOSPITAL | Age: 83
DRG: 246 | End: 2018-06-21
Attending: INTERNAL MEDICINE
Payer: MEDICARE

## 2018-06-21 LAB
BUN BLD-MCNC: 45 MG/DL (ref 8–20)
CALCIUM BLD-MCNC: 6.9 MG/DL (ref 8.3–10.3)
CHLORIDE: 114 MMOL/L (ref 101–111)
CO2: 20 MMOL/L (ref 22–32)
CREAT BLD-MCNC: 3.67 MG/DL (ref 0.7–1.3)
CREAT BLD-MCNC: 3.67 MG/DL (ref 0.7–1.3)
GLUCOSE BLD-MCNC: 106 MG/DL (ref 70–99)
POTASSIUM SERPL-SCNC: 4.3 MMOL/L (ref 3.6–5.1)
PSA SERPL-MCNC: 6.79 NG/ML (ref 0.01–4)
SODIUM SERPL-SCNC: 146 MMOL/L (ref 136–144)

## 2018-06-21 PROCEDURE — 99233 SBSQ HOSP IP/OBS HIGH 50: CPT | Performed by: INTERNAL MEDICINE

## 2018-06-21 PROCEDURE — 99232 SBSQ HOSP IP/OBS MODERATE 35: CPT | Performed by: INTERNAL MEDICINE

## 2018-06-21 PROCEDURE — 76770 US EXAM ABDO BACK WALL COMP: CPT | Performed by: INTERNAL MEDICINE

## 2018-06-21 RX ORDER — SODIUM CHLORIDE 9 MG/ML
INJECTION, SOLUTION INTRAVENOUS CONTINUOUS
Status: DISCONTINUED | OUTPATIENT
Start: 2018-06-21 | End: 2018-06-22

## 2018-06-21 RX ORDER — ALBUTEROL SULFATE 2.5 MG/3ML
2.5 SOLUTION RESPIRATORY (INHALATION) EVERY 4 HOURS PRN
Status: DISCONTINUED | OUTPATIENT
Start: 2018-06-21 | End: 2018-06-25

## 2018-06-21 NOTE — PROGRESS NOTES
[facility]  Nephrology Progress Note    Ly Fulton Patient Status:  Inpatient    10/3/1927 MRN [id number]   [facility] 2NE-A Attending Victory Goodell, MD   Hosp Day # 8 PCP Marion Drain MD [facility]       SUBJECTIVE:  No SOB, no acute 45*   CREATSERUM  3.31*  3.13*  3.13*  3.11*   --   3.35*  3.35*  3.67*  3.67*   CA  7.3*  7.1*  6.9*   --   7.1*  6.9*   GLU  96  103*  109*   --   105*  106*       Recent Labs   Lab  06/19/18   0641   ALT  22   AST  69*   ALB  2.6*       Renal u/s noted setting of CAD. W/u ongoing per cards, angiogram pending improvement in renal fxn although with ongoing issues with SOB/CP may need to proceed. Risk of clinically sig ETHAN would be on the order of 10% or so with need for HD closer to 1-5%    #4.   Acidosis

## 2018-06-21 NOTE — PROGRESS NOTES
[facility]  Progress Note     5 [facility]  Patient Status:  Inpatient    10/3/1927 MRN [id number]   [facility] 2NE-A Attending Jessica Curtis MD   Hosp Day # 8 PCP Natasha Martins MD [facility]     Chief Complaint: chest pain    S: P for input(s): TROP, CK in the last 168 hours. Imaging: Imaging data reviewed in Epic.     Medications:   • sodium bicarbonate  1,300 mg Oral BID   • Normal Saline Flush  10 mL Intravenous Q12H   • vancomycin  15 mg/kg Intravenous Q48H   • metoprolol

## 2018-06-21 NOTE — PROGRESS NOTES
[facility] Pulmonary/Critical Care Progress Note     SUBJECTIVE/24H Events: All events, procedures, notes reviewed. No acute events, dyspnea improved. On room air. Denies any other complaints. No f/c/s. No cough.  No pa 18*  16*  16*   GFRNAA  17*   --   15*  15*  14*  14*   CA  6.9*   --   7.1*  6.9*   NA  145*   --   145*  146*   K  5.7*  4.7  4.7  4.3   CL  118*   --   120*  114*   CO2  17.0*   --   19.0*  20.0*     Recent Labs   Lab  06/15/18   0547   RBC  3.14*   HGB Transdermal Daily   • Alfuzosin HCl ER  10 mg Oral Daily with breakfast   • aspirin  81 mg Oral Daily   • bicalutamide  50 mg Oral Daily   • finasteride  5 mg Oral Daily   • mirtazapine  15 mg Oral Nightly   • atorvastatin  20 mg Oral Nightly   • famoTIDin

## 2018-06-21 NOTE — PROGRESS NOTES
[facility]  Progress Note    5 [facility]  Patient Status:  Inpatient    10/3/1927 MRN [id number]   [facility] 2NE-A Attending Bhavana Gates MD   Hosp Day # 8 PCP Rema Raw SR MD [facility]       Assessment:    · CAD  · ETHAN on CRI  · HT (Diuretic)   • albuterol sulfate  2.5 mg Nebulization QID   • Normal Saline Flush  10 mL Intravenous Q12H   • vancomycin  15 mg/kg Intravenous Q48H   • metoprolol tartrate  25 mg Oral 2x Daily(Beta Blocker)   • nitroGLYCERIN  1 patch Transdermal Daily   •

## 2018-06-21 NOTE — PROCEDURES
I have placed 2 vascular devices on this patient in the past few days. The PIV extended dwell in the right forearm became dislodged when showering. Another vascular nurse, Mely Garcia placed a midline in the left cephalic with some difficulty.   Primary ca

## 2018-06-21 NOTE — PHYSICAL THERAPY NOTE
PHYSICAL THERAPY TREATMENT NOTE - INPATIENT    Room Number: 1942/5645-N     Session: 1   Number of Visits to Meet Established Goals: 5    Presenting Problem: Chest pain    Problem List  Principal Problem:    Chest pain  Active Problems:    Elevated serum Static Sitting: Good  Dynamic Sitting: Fair +           Static Standing: Fair -  Dynamic Standing: Poor +    ACTIVITY TOLERANCE  O2 Saturation: 98%  Room air  Blood Pressure: 39/49    AM-PAC '6-Clicks' INPATIENT SHORT FORM - BASIC MOBILITY  How muc spine exercises in standing. Pt then reported fatigue and returned to sitting EOB. Pt with supervision for sit<>supine.  Increased time spent on discussion regarding symptom management, being cautious and with increased time for movement transitions, and ac Sit to/from Stand at assistance level: modified independent      Goal #3 Patient is able to ambulate 150 feet with least restrictive assist device at assistance level: supervision      Goal #4     Goal #5     Goal #6     Goal Comments: Goals established on

## 2018-06-21 NOTE — PROGRESS NOTES
[facility]  Urology Progress Note    Donna Fulton Patient Status:  Inpatient    10/3/1927 MRN [id number]   [facility] 2NE-A Attending Ann Hill MD   Hosp Day # 8 PCP Fatima Knack MD [facility]     Subjective:  5 [facility]  is a(n)

## 2018-06-21 NOTE — PLAN OF CARE
Problem: Patient/Family Goals  Goal: Patient/Family Long Term Goal  Patient's Long Term Goal: To have cath    Interventions:  -Collaborate with patient, family & interdisciplinary team regarding plan of care  -Monitor labs, tele, VS  -Follow pre-cath proce Electrolytes maintained within normal limits  INTERVENTIONS:  - Monitor labs and rhythm and assess patient for signs and symptoms of electrolyte imbalances  - Administer electrolyte replacement as ordered  - Monitor response to electrolyte replacements, in exertion  Room air oxygen saturation 94-98%    Problem: SKIN/TISSUE INTEGRITY - ADULT  Goal: Skin integrity remains intact  INTERVENTIONS  - Assess and document risk factors for pressure ulcer development  - Assess and document skin integrity  - Monitor fo updated with patient  Am medications given  Physician rounds  Kidney Ultrasound/ IV fluids

## 2018-06-22 LAB
BUN BLD-MCNC: 43 MG/DL (ref 8–20)
CALCIUM BLD-MCNC: 7 MG/DL (ref 8.3–10.3)
CHLORIDE: 114 MMOL/L (ref 101–111)
CO2: 19 MMOL/L (ref 22–32)
CREAT BLD-MCNC: 3.29 MG/DL (ref 0.7–1.3)
CREAT BLD-MCNC: 3.29 MG/DL (ref 0.7–1.3)
GLUCOSE BLD-MCNC: 100 MG/DL (ref 70–99)
POTASSIUM SERPL-SCNC: 4.3 MMOL/L (ref 3.6–5.1)
SODIUM SERPL-SCNC: 141 MMOL/L (ref 136–144)

## 2018-06-22 PROCEDURE — 99232 SBSQ HOSP IP/OBS MODERATE 35: CPT | Performed by: INTERNAL MEDICINE

## 2018-06-22 NOTE — PROGRESS NOTES
[facility] Pulmonary/Critical Care Progress Note     SUBJECTIVE/24H Events: All events, procedures, notes reviewed. No acute events, only complains of feeling fatigued today. dyspnea improved. No cough.   Had an episode 15*  15*  14*  14*  16*  16*   CA  7.1*  6.9*  7.0*   NA  145*  146*  141   K  4.7  4.3  4.3   CL  120*  114*  114*   CO2  19.0*  20.0*  19.0*     No results for input(s): RBC, HGB, HCT, MCV, MCH, MCHC, RDW, NEPRELIM, WBC, PLT in the last 168 hours.   No re finasteride  5 mg Oral Daily   • mirtazapine  15 mg Oral Nightly   • atorvastatin  20 mg Oral Nightly   • famoTIDine  20 mg Oral QAM AC     albuterol sulfate, acetaminophen, magnesium hydroxide    ASSESSMENT    · Dyspnea due to fluid overload in setting of

## 2018-06-22 NOTE — PLAN OF CARE
Problem: CARDIOVASCULAR - ADULT  Goal: Maintains optimal cardiac output and hemodynamic stability  INTERVENTIONS:  - Monitor vital signs, rhythm, and trends  - Monitor for bleeding, hypotension and signs of decreased cardiac output  - Evaluate effectivenes available)  - Encourage oral intake as appropriate  - Instruct patient on fluid and nutrition restrictions as appropriate   Outcome: Progressing      Problem: RESPIRATORY - ADULT  Goal: Achieves optimal ventilation and oxygenation  INTERVENTIONS:  - Assess patient/family in tolerated activity level and precautions  - Recommend use of RW for transfers and ambulation   Outcome: Progressing      Problem: Diabetes/Glucose Control  Goal: Glucose maintained within prescribed range  INTERVENTIONS:  - Monitor Blood

## 2018-06-22 NOTE — PROGRESS NOTES
[facility]  Progress Note    5 [facility]  Patient Status:  Inpatient    10/3/1927 MRN [id number]   [facility] 2NE-A Attending Jessica Curtis MD   Hosp Day # 9 PCP Keneth Koh SR MD [facility]       Assessment:    · CAD  · ETHAN on CRI - imp vancomycin  15 mg/kg Intravenous Q48H   • metoprolol tartrate  25 mg Oral 2x Daily(Beta Blocker)   • nitroGLYCERIN  1 patch Transdermal Daily   • Alfuzosin HCl ER  10 mg Oral Daily with breakfast   • aspirin  81 mg Oral Daily   • bicalutamide  50 mg Oral D

## 2018-06-22 NOTE — PROGRESS NOTES
[facility]  Progress Note     5 [facility]  Patient Status:  Inpatient    10/3/1927 MRN [id number]   [facility] 2NE-A Attending Yumiko Jolly MD   Hosp Day # 9 PCP Kieran Lane MD [facility]     Chief Complaint: chest pain    S: P (H)).    Recent Labs   Lab  06/18/18   1155   PTP  14.8*   INR  1.11*       No results for input(s): TROP, CK in the last 168 hours. Imaging: Imaging data reviewed in Epic.     Medications:   • sodium bicarbonate  1,300 mg Oral BID   • Normal Saline

## 2018-06-22 NOTE — PROGRESS NOTES
[facility]  Nephrology Progress Note    [facility] Patient Status:  Inpatient    10/3/1927 MRN [id number]   [facility] 2NE-A Attending Marly Terry MD   Hosp Day # 9 PCP Florencia First SR MD [facility]       SUBJECTIVE:  No SOB, no acute 3.67*  3.29*  3.29*   CA  7.1*  6.9*   --   7.1*  6.9*  7.0*   GLU  103*  109*   --   105*  106*  100*       Recent Labs   Lab  06/19/18   0641   ALT  22   AST  69*   ALB  2.6*       Renal u/s noted      Meds:     Current Facility-Administered Medications need to proceed. Risk of clinically sig ETHAN would be on the order of 10% or so with need for HD closer to 1-5%    #4. Acidosis- metabolic in association with ETHAN. cont sodium bicarb tabs    #5.    UTI- finish course of vanco- 10 days total.  TMP/SMX stop

## 2018-06-22 NOTE — PROGRESS NOTES
[facility]  Urology Progress Note    Johanna Fulton Patient Status:  Inpatient    10/3/1927 MRN [id number]   [facility] 2NE-A Attending Kalina Zavala MD   Hosp Day # 9 PCP Hamilton Mulling MD [facility]     Subjective:  5 [facility]  is a(n) system has resolved. Mild fullness/dilatation the right renal collecting system is unchanged. Large calculus in the right kidney is again noted. Multiple cystic lesions in both   kidneys are noted.      Assessment:    MRSA in the urine  NGB with IFC now

## 2018-06-22 NOTE — PLAN OF CARE
Pt. Is alert and oriented times four. Lungs clear on auscultation. Pt. Is on isolation for MRSA. He is sinus rhythm on monitor. Nephrostomy tube is draining pink urine. Nephrostomy  Tube Insertion site is intact with no edema or redness noted at the site.

## 2018-06-23 LAB
BUN BLD-MCNC: 42 MG/DL (ref 8–20)
CALCIUM BLD-MCNC: 6.9 MG/DL (ref 8.3–10.3)
CHLORIDE: 113 MMOL/L (ref 101–111)
CO2: 19 MMOL/L (ref 22–32)
CREAT BLD-MCNC: 3.21 MG/DL (ref 0.7–1.3)
CREAT BLD-MCNC: 3.21 MG/DL (ref 0.7–1.3)
GLUCOSE BLD-MCNC: 104 MG/DL (ref 70–99)
POTASSIUM SERPL-SCNC: 4.5 MMOL/L (ref 3.6–5.1)
SODIUM SERPL-SCNC: 142 MMOL/L (ref 136–144)

## 2018-06-23 PROCEDURE — 99233 SBSQ HOSP IP/OBS HIGH 50: CPT | Performed by: INTERNAL MEDICINE

## 2018-06-23 PROCEDURE — 99232 SBSQ HOSP IP/OBS MODERATE 35: CPT | Performed by: INTERNAL MEDICINE

## 2018-06-23 NOTE — PROGRESS NOTES
[facility]  Progress Note    [address] Patient Status:  Inpatient    10/3/1927 MRN [id number]   [facility] 2NE-A Attending Kalina Zavala MD   Hosp Day # 10 PCP Hamilton Mulling MD [facility]     Subjective:  5 [facility] Rita Lovelace is a(n) 9 yea injury [facility])     Renal insufficiency     Urinary tract infection     Staphylococcal pneumonia (HCC)     Vomiting     Dehydration     Diarrhea     GARCIA (dyspnea on exertion)     ETHAN (acute kidney injury) ([facility] [address]     CKD (chronic kidney disease)     Coronary ar

## 2018-06-23 NOTE — PROGRESS NOTES
[facility]  Progress Note    [address] Patient Status:  Inpatient    10/3/1927 MRN [id number]   [facility] 2NE-A Attending Nam Machado MD   Hosp Day # 10 PCP Aron Meckel MD [facility]     Subjective:  5 [address] is a(n) 89 yea disease) stage 3, GFR 30-59 ml/min (HCC)     Hypernatremia     S/P femoral-popliteal bypass surgery     Aspiration pneumonia (HCC)     Acute kidney injury ([address]     Renal insufficiency     Urinary tract infection     Staphylococcal pneumonia ([facility] [address]     Vomi

## 2018-06-23 NOTE — PROGRESS NOTES
[facility]  Nephrology Progress Note    5 [facility]  Attending:  Pierre Lynne MD       Assessment and Plan:    1) ETHAN- due to obstructive uropathy, fluid shifts, etc- improving gradually    2) CKD 3- baseline Cr < 2 mg/dl due to HTN / age-related n studies reviewed.     Meds:     Current Facility-Administered Medications:  albuterol sulfate (VENTOLIN) (2.5 MG/3ML) 0.083% nebulizer solution 2.5 mg 2.5 mg Nebulization Q4H PRN   acetaminophen (TYLENOL EXTRA STRENGTH) tab 500 mg 500 mg Oral Q6H PRN   sodi

## 2018-06-23 NOTE — CM/SW NOTE
Care Management/BPCI:    Met with patient at bedside to explain the BPCI/Medicare program. Patient agreed with phone f/u for 3 months from 0 [facility] after discharge from [facility]. Patient was enrolled under DR[facility]       683  .  BPCI/Medicare Letter and

## 2018-06-23 NOTE — PROGRESS NOTES
[facility]  Progress Note     5 [facility]  Patient Status:  Inpatient    10/3/1927 MRN [id number]   [facility] 2NE-A Attending Marly Terry MD   Hosp Day # 10 PCP Khalif Wilson MD [facility]     Chief Complaint: chest pain    S: (H)).    Recent Labs   Lab  06/18/18   1155   PTP  14.8*   INR  1.11*       No results for input(s): TROP, CK in the last 168 hours. Imaging: Imaging data reviewed in Epic.     Medications:   • sodium bicarbonate  1,300 mg Oral BID   • Normal Saline

## 2018-06-24 ENCOUNTER — APPOINTMENT (OUTPATIENT)
Dept: GENERAL RADIOLOGY | Facility: HOSPITAL | Age: 83
DRG: 246 | End: 2018-06-24
Attending: INTERNAL MEDICINE
Payer: MEDICARE

## 2018-06-24 LAB
BASOPHILS # BLD AUTO: 0.04 X10(3) UL (ref 0–0.1)
BASOPHILS NFR BLD AUTO: 0.5 %
BUN BLD-MCNC: 43 MG/DL (ref 8–20)
CALCIUM BLD-MCNC: 7 MG/DL (ref 8.3–10.3)
CHLORIDE: 116 MMOL/L (ref 101–111)
CO2: 15 MMOL/L (ref 22–32)
CREAT BLD-MCNC: 3.07 MG/DL (ref 0.7–1.3)
CREAT BLD-MCNC: 3.07 MG/DL (ref 0.7–1.3)
EOSINOPHIL # BLD AUTO: 0.33 X10(3) UL (ref 0–0.3)
EOSINOPHIL NFR BLD AUTO: 4.5 %
ERYTHROCYTE [DISTWIDTH] IN BLOOD BY AUTOMATED COUNT: 14.5 % (ref 11.5–16)
GLUCOSE BLD-MCNC: 97 MG/DL (ref 70–99)
HCT VFR BLD AUTO: 24.7 % (ref 37–53)
HGB BLD-MCNC: 7.8 G/DL (ref 13–17)
IMMATURE GRANULOCYTE COUNT: 0.06 X10(3) UL (ref 0–1)
IMMATURE GRANULOCYTE RATIO %: 0.8 %
LYMPHOCYTES # BLD AUTO: 1.24 X10(3) UL (ref 0.9–4)
LYMPHOCYTES NFR BLD AUTO: 16.9 %
MCH RBC QN AUTO: 29.7 PG (ref 27–33.2)
MCHC RBC AUTO-ENTMCNC: 31.6 G/DL (ref 31–37)
MCV RBC AUTO: 93.9 FL (ref 80–99)
MONOCYTES # BLD AUTO: 0.51 X10(3) UL (ref 0.1–1)
MONOCYTES NFR BLD AUTO: 6.9 %
NEUTROPHIL ABS PRELIM: 5.16 X10 (3) UL (ref 1.3–6.7)
NEUTROPHILS # BLD AUTO: 5.16 X10(3) UL (ref 1.3–6.7)
NEUTROPHILS NFR BLD AUTO: 70.4 %
PLATELET # BLD AUTO: 219 10(3)UL (ref 150–450)
POTASSIUM SERPL-SCNC: 4.5 MMOL/L (ref 3.6–5.1)
RBC # BLD AUTO: 2.63 X10(6)UL (ref 3.8–5.8)
RED CELL DISTRIBUTION WIDTH-SD: 49 FL (ref 35.1–46.3)
SODIUM SERPL-SCNC: 139 MMOL/L (ref 136–144)
WBC # BLD AUTO: 7.3 X10(3) UL (ref 4–13)

## 2018-06-24 PROCEDURE — 99233 SBSQ HOSP IP/OBS HIGH 50: CPT | Performed by: INTERNAL MEDICINE

## 2018-06-24 PROCEDURE — 71045 X-RAY EXAM CHEST 1 VIEW: CPT | Performed by: INTERNAL MEDICINE

## 2018-06-24 PROCEDURE — 99232 SBSQ HOSP IP/OBS MODERATE 35: CPT | Performed by: INTERNAL MEDICINE

## 2018-06-24 RX ORDER — ASPIRIN 81 MG/1
324 TABLET, CHEWABLE ORAL ONCE
Status: COMPLETED | OUTPATIENT
Start: 2018-06-24 | End: 2018-06-25

## 2018-06-24 RX ORDER — SODIUM CHLORIDE 9 MG/ML
INJECTION, SOLUTION INTRAVENOUS CONTINUOUS
Status: DISCONTINUED | OUTPATIENT
Start: 2018-06-24 | End: 2018-06-27

## 2018-06-24 RX ORDER — SODIUM CHLORIDE 9 MG/ML
INJECTION, SOLUTION INTRAVENOUS CONTINUOUS
Status: DISCONTINUED | OUTPATIENT
Start: 2018-06-24 | End: 2018-06-25

## 2018-06-24 NOTE — CONSULTS
[facility]    Gastroenterology Initial Consultation    5 [facility]  Patient Status:  Inpatient    10/3/1927 MRN [id number]   [facility] 2NE-A Attending Yg Foy MD   Hosp Day # 11 PCP Anell Prader MD [facility]       Reason for Co He has never used smokeless tobacco. He reports that he does not drink alcohol or use drugs.     Allergies:    Duricef [Cefadroxil]    RASH    Medications:    Current Facility-Administered Medications:   •  0.9%  NaCl infusion, , Intravenous, Continuous  • rate/palpitations. Respiratory: Denies shortness of breath, chronic/frequent hoarseness, wheezing, chronic cough, spitting up blood, cough up sputum.   Genitourinary: Denies painful/difficult urination, frequent urinary infections, frequent urination, bloo consultation for anemia. His hgb is slightly lower than baseline though no evidence of GI bleeding. MCV is normocytic and given long-standing history of CKD, anemia likely chronic disease in nature. Given severe CHF, risks of endoscopy outweigh benefit.

## 2018-06-24 NOTE — PLAN OF CARE
Problem: Patient/Family Goals  Goal: Patient/Family Long Term Goal  Patient's Long Term Goal: To have cath    Interventions:  -Collaborate with patient, family & interdisciplinary team regarding plan of care  -Monitor labs, tele, VS  -Follow pre-cath proce electrolyte replacements, including rhythm and repeat lab results as appropriate  - Instruct patient on fluid and nutrition restrictions as appropriate   Outcome: Progressing    Goal: Hemodynamic stability and optimal renal function maintained  INTERVENTIO infection  INTERVENTIONS:  - Assess and document risk factors for pressure ulcer development  - Assess and document skin integrity  - Assess and document dressing/incision, wound bed, drain sites and surrounding tissue  - Implement wound care per orders  -

## 2018-06-24 NOTE — PLAN OF CARE
Problem: CARDIOVASCULAR - ADULT  Goal: Maintains optimal cardiac output and hemodynamic stability  INTERVENTIONS:  - Monitor vital signs, rhythm, and trends  - Monitor for bleeding, hypotension and signs of decreased cardiac output  - Evaluate effectivenes available)  - Encourage oral intake as appropriate  - Instruct patient on fluid and nutrition restrictions as appropriate   Outcome: Progressing      Problem: RESPIRATORY - ADULT  Goal: Achieves optimal ventilation and oxygenation  INTERVENTIONS:  - Assess within target range  - Assess barriers to adequate nutritional intake and initiate nutrition consult as needed  - Instruct patient on self management of diabetes   Outcome: Progressing  ALERT AND ORIENTED X4 ON TELE MONITOR HR 80'S SINUS RHYTHM.  C/O MILD A

## 2018-06-24 NOTE — PROGRESS NOTES
[facility]  Progress Note     5 [facility]  Patient Status:  Inpatient    10/3/1927 MRN [id number]   [facility] 2NE-A Attending Ann Hill MD   Hosp Day # 6 PCP Gabrielle Pozo MD [address] [place] [address]     Chief Complaint: chest pain    S: displayed. Estimated Creatinine Clearance: 16.5 mL/min (A) (based on SCr of 3.07 mg/dL (H)). Recent Labs   Lab  06/18/18   1155   PTP  14.8*   INR  1.11*       No results for input(s): TROP, CK in the last 168 hours.          Imaging: Imaging data

## 2018-06-24 NOTE — PROGRESS NOTES
[facility]  Nephrology Progress Note    5 [facility]  Attending:  Mac Tsang MD       Assessment and Plan:    1) ETHAN- due to obstructive uropathy, fluid shifts, etc- improving gradually    2) CKD 3- baseline Cr < 2 mg/dl due to HTN / age-related n 06/24/2018   K 4.5 06/24/2018    06/24/2018   CO2 15.0 06/24/2018   GLU 97 06/24/2018   CA 7.0 06/24/2018       Imaging: All imaging studies reviewed.     Meds:     Current Facility-Administered Medications:  albuterol sulfate (VENTOLIN) (2.5 MG/3ML)

## 2018-06-24 NOTE — PROGRESS NOTES
· [facility] Cardiology Progress Note     Subjective:  Still with exertional dyspnea, no chest pain    Objective:  97/67  Afebrile  SR  I/O incomplete   BUN/cr 43/3.07  Hgb 7.8    Cardiac: S1 S2 regular  Lungs: decreased BS bases  Abdomen: soft  Extremit

## 2018-06-24 NOTE — PLAN OF CARE
Pt. Is alert and oriented times four. Lungs clear on auscultation. Pt. Is sinus rhythm on monitor. He has no c/o pain. Nephrostomy tube draining yellow urine now. Pt. Is more alert this AM: sitting in chair shaving.  Pt. Remains in isolation for MRSA in uri

## 2018-06-25 LAB
APTT PPP: 38.4 SECONDS (ref 26.1–34.6)
BASOPHILS # BLD AUTO: 0.03 X10(3) UL (ref 0–0.1)
BASOPHILS NFR BLD AUTO: 0.4 %
BUN BLD-MCNC: 44 MG/DL (ref 8–20)
CALCIUM BLD-MCNC: 6.7 MG/DL (ref 8.3–10.3)
CHLORIDE: 113 MMOL/L (ref 101–111)
CO2: 23 MMOL/L (ref 22–32)
CREAT BLD-MCNC: 2.8 MG/DL (ref 0.7–1.3)
CREAT BLD-MCNC: 2.8 MG/DL (ref 0.7–1.3)
DEPRECATED HBV CORE AB SER IA-ACNC: 150.1 NG/ML (ref 30–530)
EOSINOPHIL # BLD AUTO: 0.31 X10(3) UL (ref 0–0.3)
EOSINOPHIL NFR BLD AUTO: 4.6 %
ERYTHROCYTE [DISTWIDTH] IN BLOOD BY AUTOMATED COUNT: 14.3 % (ref 11.5–16)
GLUCOSE BLD-MCNC: 116 MG/DL (ref 70–99)
HCT VFR BLD AUTO: 24.8 % (ref 37–53)
HGB BLD-MCNC: 7.8 G/DL (ref 13–17)
IMMATURE GRANULOCYTE COUNT: 0.03 X10(3) UL (ref 0–1)
IMMATURE GRANULOCYTE RATIO %: 0.4 %
INR BLD: 1.2 (ref 0.9–1.1)
IRON SATURATION: 13 % (ref 20–50)
IRON: 31 UG/DL (ref 45–182)
LYMPHOCYTES # BLD AUTO: 1.08 X10(3) UL (ref 0.9–4)
LYMPHOCYTES NFR BLD AUTO: 15.9 %
MCH RBC QN AUTO: 29.8 PG (ref 27–33.2)
MCHC RBC AUTO-ENTMCNC: 31.5 G/DL (ref 31–37)
MCV RBC AUTO: 94.7 FL (ref 80–99)
MONOCYTES # BLD AUTO: 0.45 X10(3) UL (ref 0.1–1)
MONOCYTES NFR BLD AUTO: 6.6 %
NEUTROPHIL ABS PRELIM: 4.9 X10 (3) UL (ref 1.3–6.7)
NEUTROPHILS # BLD AUTO: 4.9 X10(3) UL (ref 1.3–6.7)
NEUTROPHILS NFR BLD AUTO: 72.1 %
PLATELET # BLD AUTO: 183 10(3)UL (ref 150–450)
POTASSIUM SERPL-SCNC: 4 MMOL/L (ref 3.6–5.1)
PSA SERPL DL<=0.01 NG/ML-MCNC: 15.7 SECONDS (ref 12.4–14.7)
RBC # BLD AUTO: 2.62 X10(6)UL (ref 3.8–5.8)
RED CELL DISTRIBUTION WIDTH-SD: 48.5 FL (ref 35.1–46.3)
SODIUM SERPL-SCNC: 144 MMOL/L (ref 136–144)
TOTAL IRON BINDING CAPACITY: 237 UG/DL (ref 240–450)
TRANSFERRIN: 159 MG/DL (ref 200–360)
WBC # BLD AUTO: 6.8 X10(3) UL (ref 4–13)

## 2018-06-25 PROCEDURE — 99232 SBSQ HOSP IP/OBS MODERATE 35: CPT | Performed by: INTERNAL MEDICINE

## 2018-06-25 PROCEDURE — 99233 SBSQ HOSP IP/OBS HIGH 50: CPT | Performed by: INTERNAL MEDICINE

## 2018-06-25 RX ORDER — IPRATROPIUM BROMIDE AND ALBUTEROL SULFATE 2.5; .5 MG/3ML; MG/3ML
3 SOLUTION RESPIRATORY (INHALATION) 4 TIMES DAILY
Status: DISCONTINUED | OUTPATIENT
Start: 2018-06-25 | End: 2018-07-02

## 2018-06-25 RX ORDER — ASPIRIN 81 MG/1
324 TABLET, CHEWABLE ORAL ONCE
Status: COMPLETED | OUTPATIENT
Start: 2018-06-26 | End: 2018-06-26

## 2018-06-25 RX ORDER — SODIUM CHLORIDE 9 MG/ML
INJECTION, SOLUTION INTRAVENOUS CONTINUOUS
Status: DISCONTINUED | OUTPATIENT
Start: 2018-06-25 | End: 2018-06-25

## 2018-06-25 RX ORDER — ASPIRIN 81 MG/1
81 TABLET, CHEWABLE ORAL DAILY
Status: DISCONTINUED | OUTPATIENT
Start: 2018-06-27 | End: 2018-07-07

## 2018-06-25 NOTE — PROGRESS NOTES
[facility]  Cardiology Progress Note    [facility] Patient Status:  Inpatient    10/3/1927 MRN [id number]   [facility] 2NE-A Attending Boris Islas MD   Hosp Day # 15 PCP Georgeana Smiling MD [facility]     Subjective:  Still with some e Intravenous Q12H   • vancomycin  15 mg/kg Intravenous Q48H   • metoprolol tartrate  25 mg Oral 2x Daily(Beta Blocker)   • nitroGLYCERIN  1 patch Transdermal Daily   • Alfuzosin HCl ER  10 mg Oral Daily with breakfast   • aspirin  81 mg Oral Daily   • bical

## 2018-06-25 NOTE — PHYSICAL THERAPY NOTE
Attempted to see pt for physical therapy treatment at this time. The pt is eating his lunch and declines therapy stating, \"I don't think I'm up for it. I'm short of breath and I just don't feel good. I don't think I can. \"  Will plan to re-attempt to s

## 2018-06-25 NOTE — PROGRESS NOTES
[facility]  Nephrology Progress Note    5 [facility]  Attending:  Davon Driscoll MD       Assessment and Plan:    1) ETHAN- due to obstructive uropathy, fluid shifts, etc- improving gradually    2) CKD 3- baseline Cr < 2 mg/dl due to HTN / age-related n 06/25/2018   BUN 44 06/25/2018    06/25/2018   K 4.0 06/25/2018    06/25/2018   CO2 23.0 06/25/2018    06/25/2018   CA 6.7 06/25/2018   PTT 38.4 06/25/2018   INR 1.20 06/25/2018   PTP 15.7 06/25/2018       Imaging:   All imaging studies r

## 2018-06-25 NOTE — DIETARY NOTE
NUTRITION FOLLOW UP ASSESSMENT    Pt is at moderate nutrition risk. Pt does not meet malnutrition criteria.     NUTRITION DIAGNOSIS/PROBLEM:    Inadequate energy intake related to physiological causes as evidenced by reported poor intake x 1 mo & 5% wt loss 5'10\"  Wt: 80.9 kg (178 lb 6.4 oz). This is 111 % of IBW  BMI: Body mass index is 25.6 kg/m².   IBW: 75 kg  Usual Body Wt: 81 kg    WEIGHT HISTORY:   Wt Readings from Last 6 Encounters:  06/25/18 : 80.9 kg (178 lb 6.4 oz)  06/13/18 : 76.2 kg (168 lb)  01/1

## 2018-06-25 NOTE — PROGRESS NOTES
[facility]  Progress Note     5 [facility]  Patient Status:  Inpatient    10/3/1927 MRN [id number]   [facility] 2NE-A Attending Evangelina Walton MD   Hosp Day # 15 PCP Roxana Sood MD [facility]     Chief Complaint: chest pain    S: --    --    --    --     < > = values in this interval not displayed. Estimated Creatinine Clearance: 18.1 mL/min (A) (based on SCr of 2.8 mg/dL (H)).     Recent Labs   Lab  06/18/18   1155  06/25/18   0546   PTP  14.8*  15.7*   INR  1.11*  1.20* patient, RN    Evelyn Kwong MD

## 2018-06-25 NOTE — PROGRESS NOTES
[facility]  Progress Note    5 [facility]  Patient Status:  Inpatient    10/3/1927 MRN [id number]   [facility] 2NE-A Attending Missy Hardy MD   Hosp Day # 12 PCP Sammy Ford MD [facility]     ASSESSMENT     · Dyspnea due to fluid kidney     MRSA (methicillin resistant staph aureus) culture positive     Hyperkalemia     Acute renal failure superimposed on stage 3 chronic kidney disease (HCC)      Subjective:  5 [facility]  is a(n) 80year old male.   In bed    was not feeling well th 19*  19*   CA  6.9*   < >  6.9*  7.0*  6.9*  7.0*  6.7*   ALB  2.6*   --    --    --    --    --    --    NA  145*   < >  146*  141  142  139  144   K  5.7*   < >  4.3  4.3  4.5  4.5  4.0   CL  118*   < >  114*  114*  113*  116*  113*   CO2  17.0*   < >  2 30 mL Oral Daily PRN   aspirin chewable tab 81 mg 81 mg Oral Daily   bicalutamide (CASODEX) tab 50 mg 50 mg Oral Daily   finasteride (PROSCAR) tab 5 mg 5 mg Oral Daily   mirtazapine (REMERON) tab 15 mg 15 mg Oral Nightly   atorvastatin (LIPITOR) tab 20 mg

## 2018-06-26 ENCOUNTER — APPOINTMENT (OUTPATIENT)
Dept: INTERVENTIONAL RADIOLOGY/VASCULAR | Facility: HOSPITAL | Age: 83
DRG: 246 | End: 2018-06-26
Attending: INTERNAL MEDICINE
Payer: MEDICARE

## 2018-06-26 LAB
APTT PPP: 37.3 SECONDS (ref 26.1–34.6)
BASOPHILS # BLD AUTO: 0.05 X10(3) UL (ref 0–0.1)
BASOPHILS NFR BLD AUTO: 0.7 %
BUN BLD-MCNC: 44 MG/DL (ref 8–20)
CALCIUM BLD-MCNC: 6.6 MG/DL (ref 8.3–10.3)
CHLORIDE: 112 MMOL/L (ref 101–111)
CO2: 23 MMOL/L (ref 22–32)
CREAT BLD-MCNC: 2.92 MG/DL (ref 0.7–1.3)
CREAT BLD-MCNC: 2.92 MG/DL (ref 0.7–1.3)
EOSINOPHIL # BLD AUTO: 0.24 X10(3) UL (ref 0–0.3)
EOSINOPHIL NFR BLD AUTO: 3.2 %
ERYTHROCYTE [DISTWIDTH] IN BLOOD BY AUTOMATED COUNT: 14.6 % (ref 11.5–16)
GLUCOSE BLD-MCNC: 107 MG/DL (ref 70–99)
HCT VFR BLD AUTO: 24.9 % (ref 37–53)
HGB BLD-MCNC: 7.6 G/DL (ref 13–17)
IMMATURE GRANULOCYTE COUNT: 0.04 X10(3) UL (ref 0–1)
IMMATURE GRANULOCYTE RATIO %: 0.5 %
INR BLD: 1.19 (ref 0.9–1.1)
ISTAT ACTIVATED CLOTTING TIME: 158 SECONDS (ref 74–137)
ISTAT ACTIVATED CLOTTING TIME: 169 SECONDS (ref 74–137)
LYMPHOCYTES # BLD AUTO: 1.27 X10(3) UL (ref 0.9–4)
LYMPHOCYTES NFR BLD AUTO: 16.9 %
MCH RBC QN AUTO: 29.3 PG (ref 27–33.2)
MCHC RBC AUTO-ENTMCNC: 30.5 G/DL (ref 31–37)
MCV RBC AUTO: 96.1 FL (ref 80–99)
MONOCYTES # BLD AUTO: 0.53 X10(3) UL (ref 0.1–1)
MONOCYTES NFR BLD AUTO: 7 %
NEUTROPHIL ABS PRELIM: 5.4 X10 (3) UL (ref 1.3–6.7)
NEUTROPHILS # BLD AUTO: 5.4 X10(3) UL (ref 1.3–6.7)
NEUTROPHILS NFR BLD AUTO: 71.7 %
PLATELET # BLD AUTO: 203 10(3)UL (ref 150–450)
POTASSIUM SERPL-SCNC: 4.4 MMOL/L (ref 3.6–5.1)
PSA SERPL DL<=0.01 NG/ML-MCNC: 15.6 SECONDS (ref 12.4–14.7)
RBC # BLD AUTO: 2.59 X10(6)UL (ref 3.8–5.8)
RED CELL DISTRIBUTION WIDTH-SD: 50.4 FL (ref 35.1–46.3)
SODIUM SERPL-SCNC: 145 MMOL/L (ref 136–144)
VANCOMYCIN TROUGH: 19.1 UG/ML (ref 10–20)
WBC # BLD AUTO: 7.5 X10(3) UL (ref 4–13)

## 2018-06-26 PROCEDURE — 027034Z DILATION OF CORONARY ARTERY, ONE ARTERY WITH DRUG-ELUTING INTRALUMINAL DEVICE, PERCUTANEOUS APPROACH: ICD-10-PCS | Performed by: INTERNAL MEDICINE

## 2018-06-26 PROCEDURE — 99232 SBSQ HOSP IP/OBS MODERATE 35: CPT | Performed by: INTERNAL MEDICINE

## 2018-06-26 PROCEDURE — B41FYZZ FLUOROSCOPY OF RIGHT LOWER EXTREMITY ARTERIES USING OTHER CONTRAST: ICD-10-PCS | Performed by: INTERNAL MEDICINE

## 2018-06-26 PROCEDURE — B211YZZ FLUOROSCOPY OF MULTIPLE CORONARY ARTERIES USING OTHER CONTRAST: ICD-10-PCS | Performed by: INTERNAL MEDICINE

## 2018-06-26 PROCEDURE — 4A023N7 MEASUREMENT OF CARDIAC SAMPLING AND PRESSURE, LEFT HEART, PERCUTANEOUS APPROACH: ICD-10-PCS | Performed by: INTERNAL MEDICINE

## 2018-06-26 RX ORDER — MIDAZOLAM HYDROCHLORIDE 1 MG/ML
INJECTION INTRAMUSCULAR; INTRAVENOUS
Status: COMPLETED
Start: 2018-06-26 | End: 2018-06-26

## 2018-06-26 RX ORDER — MORPHINE SULFATE 4 MG/ML
1 INJECTION, SOLUTION INTRAMUSCULAR; INTRAVENOUS ONCE
Status: COMPLETED | OUTPATIENT
Start: 2018-06-26 | End: 2018-06-26

## 2018-06-26 RX ORDER — CLOPIDOGREL BISULFATE 75 MG/1
TABLET ORAL
Status: COMPLETED
Start: 2018-06-26 | End: 2018-06-26

## 2018-06-26 RX ORDER — CLOPIDOGREL BISULFATE 75 MG/1
75 TABLET ORAL DAILY
Status: DISCONTINUED | OUTPATIENT
Start: 2018-06-27 | End: 2018-06-26

## 2018-06-26 RX ORDER — LIDOCAINE HYDROCHLORIDE 10 MG/ML
INJECTION, SOLUTION EPIDURAL; INFILTRATION; INTRACAUDAL; PERINEURAL
Status: COMPLETED
Start: 2018-06-26 | End: 2018-06-26

## 2018-06-26 RX ORDER — HEPARIN SODIUM 5000 [USP'U]/ML
INJECTION, SOLUTION INTRAVENOUS; SUBCUTANEOUS
Status: COMPLETED
Start: 2018-06-26 | End: 2018-06-26

## 2018-06-26 RX ORDER — CLOPIDOGREL BISULFATE 75 MG/1
75 TABLET ORAL DAILY
Status: DISCONTINUED | OUTPATIENT
Start: 2018-06-27 | End: 2018-07-07

## 2018-06-26 RX ORDER — SODIUM CHLORIDE 9 MG/ML
INJECTION, SOLUTION INTRAVENOUS CONTINUOUS
Status: ACTIVE | OUTPATIENT
Start: 2018-06-26 | End: 2018-06-26

## 2018-06-26 NOTE — PLAN OF CARE
Sat up in recliner for about 2 hours, claims he was tired and a little dizzy after  Put back in bed  bp 90/50, short of breath with exertion, O2 sats on room air 98%  IV 0.9 NS at 50cc/hr  complained of constipation, had mom and prune juice with good resul

## 2018-06-26 NOTE — PROGRESS NOTES
81 y/o male with unstable angina, ETHAN secondary to obstructive uropathy, post nephrostomy, HTN, anemia    RFA TOTAL DYE 24cc  LV not done  LAD 95% prox  50%  RCA small    Discussed with IR about Plavix  4.0 EBU.  predil with 3.0 balloon  Then 3.5 x 12 [place]

## 2018-06-26 NOTE — PROGRESS NOTES
[facility]  Progress Note     5 [facility]  Patient Status:  Inpatient    10/3/1927 MRN [id number]   [facility] 2NE-A Attending Dick Ballard MD   Hosp Day # 15 PCP Earl De La O MD [address] [place] [address]     Chief Complaint: chest pain    S: Subcutaneous Q7 Days   • ipratropium-albuterol  3 mL Nebulization QID   • [START ON 6/27/2018] aspirin  81 mg Oral Daily   • sodium bicarbonate  1,300 mg Oral BID   • Normal Saline Flush  10 mL Intravenous Q12H   • vancomycin  15 mg/kg Intravenous Q48H   •

## 2018-06-26 NOTE — PROGRESS NOTES
[facility]  Nephrology Progress Note    5 [facility]  Attending:  Haris Allen MD       Assessment and Plan:    1) ETHAN- due to obstructive uropathy, fluid shifts, etc- improved    2) CKD 3- baseline Cr < 2 mg/dl due to HTN / age-related nephrosclero 06/26/2018   CREATSERUM 2.92 06/26/2018   BUN 44 06/26/2018    06/26/2018   K 4.4 06/26/2018    06/26/2018   CO2 23.0 06/26/2018    06/26/2018   CA 6.6 06/26/2018   PTT 37.3 06/26/2018   INR 1.19 06/26/2018   PTP 15.6 06/26/2018       Im

## 2018-06-26 NOTE — PHYSICAL THERAPY NOTE
PHYSICAL THERAPY TREATMENT NOTE - INPATIENT    Room Number: 5856/5043-A     Session: 1   Number of Visits to Meet Established Goals: 5    Presenting Problem: Chest pain    Problem List  Principal Problem:    Chest pain  Active Problems:    Elevated serum +           Static Standing: Fair -  Dynamic Standing: Poor +    ACTIVITY TOLERANCE  O2 Saturation: at rest % on room    O2 Saturation with activity 92% with  O2 Device: Nasal cannula  Liters of O2:  2.0    Shortness of breath  Heart Rate: at rest 85 20 feet but further ambulation again limited by dizziness. Pt returned to bedside chair with CGA for safety. All ambulation with use of a RW with SBA-CGA.     THERAPEUTIC EXERCISES  Lower Extremity Ankle pumps  Hip AB/AD  Heel slides  Quad sets     Uppe

## 2018-06-26 NOTE — PROGRESS NOTES
[facility]  Progress Note    5 [facility]  Patient Status:  Inpatient    10/3/1927 MRN [id number]   [facility] 2NE-A Attending Boris Islas MD   Hosp Day # 15 PCP Georgeana Smiling MD [facility]     ASSESSMENT     · Dyspnea due to fluid Chest pain     Hydronephrosis of left kidney     MRSA (methicillin resistant staph aureus) culture positive     Hyperkalemia     Acute renal failure superimposed on stage 3 chronic kidney disease (HCC)      Subjective:  5 [facility]  is a(n) 80year old ma 6.6*   NA  141  142  139  144  145*   K  4.3  4.5  4.5  4.0  4.4   CL  114*  113*  116*  113*  112*   CO2  19.0*  19.0*  15.0*  23.0  23.0     @MG@      Lab Results  Component Value Date   PT 14.3 10/17/2013   PT 14.2 10/15/2013   PT 13.7 10/14/2013   INR Sara  6/26/2018  10:02 AM

## 2018-06-27 ENCOUNTER — APPOINTMENT (OUTPATIENT)
Dept: GENERAL RADIOLOGY | Facility: HOSPITAL | Age: 83
DRG: 246 | End: 2018-06-27
Attending: INTERNAL MEDICINE
Payer: MEDICARE

## 2018-06-27 LAB
ALLENS TEST: POSITIVE
ARTERIAL BLD GAS O2 SATURATION: 95 % (ref 92–100)
ARTERIAL BLOOD GAS BASE EXCESS: -5
ARTERIAL BLOOD GAS HCO3: 20.4 MEQ/L (ref 22–26)
ARTERIAL BLOOD GAS PCO2: 39 MM HG (ref 35–45)
ARTERIAL BLOOD GAS PH: 7.34 (ref 7.35–7.45)
ARTERIAL BLOOD GAS PO2: 76 MM HG (ref 80–105)
ATRIAL RATE: 91 BPM
BASOPHILS # BLD AUTO: 0.05 X10(3) UL (ref 0–0.1)
BASOPHILS NFR BLD AUTO: 0.6 %
BUN BLD-MCNC: 42 MG/DL (ref 8–20)
CALCIUM BLD-MCNC: 6.6 MG/DL (ref 8.3–10.3)
CALCULATED O2 SATURATION: 94 % (ref 92–100)
CARBOXYHEMOGLOBIN: 1.3 % SAT (ref 0–3)
CHLORIDE: 113 MMOL/L (ref 101–111)
CO2: 21 MMOL/L (ref 22–32)
CREAT BLD-MCNC: 2.81 MG/DL (ref 0.7–1.3)
CREAT BLD-MCNC: 2.81 MG/DL (ref 0.7–1.3)
EOSINOPHIL # BLD AUTO: 0.05 X10(3) UL (ref 0–0.3)
EOSINOPHIL NFR BLD AUTO: 0.6 %
ERYTHROCYTE [DISTWIDTH] IN BLOOD BY AUTOMATED COUNT: 14.8 % (ref 11.5–16)
GLUCOSE BLD-MCNC: 135 MG/DL (ref 70–99)
HCT VFR BLD AUTO: 26.6 % (ref 37–53)
HGB BLD-MCNC: 8 G/DL (ref 13–17)
IMMATURE GRANULOCYTE COUNT: 0.09 X10(3) UL (ref 0–1)
IMMATURE GRANULOCYTE RATIO %: 1 %
L/M: 2 L/MIN
LYMPHOCYTES # BLD AUTO: 0.71 X10(3) UL (ref 0.9–4)
LYMPHOCYTES NFR BLD AUTO: 8.2 %
MCH RBC QN AUTO: 29.3 PG (ref 27–33.2)
MCHC RBC AUTO-ENTMCNC: 30.1 G/DL (ref 31–37)
MCV RBC AUTO: 97.4 FL (ref 80–99)
METHEMOGLOBIN: 0.5 % SAT (ref 0.4–1.5)
MONOCYTES # BLD AUTO: 0.47 X10(3) UL (ref 0.1–1)
MONOCYTES NFR BLD AUTO: 5.4 %
NEUTROPHIL ABS PRELIM: 7.32 X10 (3) UL (ref 1.3–6.7)
NEUTROPHILS # BLD AUTO: 7.32 X10(3) UL (ref 1.3–6.7)
NEUTROPHILS NFR BLD AUTO: 84.2 %
P AXIS: 52 DEGREES
P-R INTERVAL: 140 MS
PATIENT TEMPERATURE: 98.1 F
PLATELET # BLD AUTO: 207 10(3)UL (ref 150–450)
POTASSIUM SERPL-SCNC: 4.5 MMOL/L (ref 3.6–5.1)
Q-T INTERVAL: 378 MS
QRS DURATION: 90 MS
QTC CALCULATION (BEZET): 462 MS
R AXIS: 1 DEGREES
RBC # BLD AUTO: 2.73 X10(6)UL (ref 3.8–5.8)
RED CELL DISTRIBUTION WIDTH-SD: 51.8 FL (ref 35.1–46.3)
SODIUM SERPL-SCNC: 144 MMOL/L (ref 136–144)
T AXIS: 105 DEGREES
TOTAL HEMOGLOBIN: 7.4 G/DL (ref 12.6–17.4)
VENTRICULAR RATE: 90 BPM
WBC # BLD AUTO: 8.7 X10(3) UL (ref 4–13)

## 2018-06-27 PROCEDURE — 99232 SBSQ HOSP IP/OBS MODERATE 35: CPT | Performed by: HOSPITALIST

## 2018-06-27 PROCEDURE — 99233 SBSQ HOSP IP/OBS HIGH 50: CPT | Performed by: INTERNAL MEDICINE

## 2018-06-27 PROCEDURE — 71045 X-RAY EXAM CHEST 1 VIEW: CPT | Performed by: INTERNAL MEDICINE

## 2018-06-27 RX ORDER — FUROSEMIDE 10 MG/ML
40 INJECTION INTRAMUSCULAR; INTRAVENOUS
Status: DISCONTINUED | OUTPATIENT
Start: 2018-06-27 | End: 2018-07-01

## 2018-06-27 RX ORDER — FUROSEMIDE 10 MG/ML
40 INJECTION INTRAMUSCULAR; INTRAVENOUS
Status: DISCONTINUED | OUTPATIENT
Start: 2018-06-27 | End: 2018-06-27

## 2018-06-27 RX ORDER — FUROSEMIDE 10 MG/ML
40 INJECTION INTRAMUSCULAR; INTRAVENOUS ONCE
Status: COMPLETED | OUTPATIENT
Start: 2018-06-27 | End: 2018-06-27

## 2018-06-27 NOTE — PROGRESS NOTES
[facility]  Nephrology Progress Note    Bonita Colmenares Attending:  Dick Ballard MD       Assessment and Plan:    1) ETHAN- due to obstructive uropathy, fluid shifts, etc- improved / stable.  Likely volume up after IVF over last 36 hrs- has SOB / [employer] 06/27/2018   .0 06/27/2018   CREATSERUM 2.81 06/27/2018   CREATSERUM 2.81 06/27/2018   BUN 42 06/27/2018    06/27/2018   K 4.5 06/27/2018    06/27/2018   CO2 21.0 06/27/2018    06/27/2018   CA 6.6 06/27/2018       Imaging:   All im

## 2018-06-27 NOTE — PROGRESS NOTES
[facility]  Progress Note    5 [facility]  Patient Status:  Inpatient    10/3/1927 MRN [id number]   [facility] 2NE-A Attending Elinor York MD   Hosp Day # 15 PCP Josephina Poles MD [facility]     ASSESSMENT     · Dyspnea due to fluid Elevated serum creatinine     Chest pain     Hydronephrosis of left kidney     MRSA (methicillin resistant staph aureus) culture positive     Hyperkalemia     Acute renal failure superimposed on stage 3 chronic kidney disease ([address]     Obstructive uropathy 19*  19*   CA  6.9*  7.0*  6.7*  6.6*  6.6*   NA  142  139  144  145*  144   K  4.5  4.5  4.0  4.4  4.5   CL  113*  116*  113*  112*  113*   CO2  19.0*  15.0*  23.0  23.0  21.0*     @MG@      Lab Results  Component Value Date   PT 14.3 10/17/2013   PT 14. 2 mg Oral QAM AC       PEAK flow  PF Readings from Last 1 Encounters:  No data found for PF        Domenica Ruiz  6/27/2018  3:10 PM

## 2018-06-27 NOTE — PLAN OF CARE
CARDIOVASCULAR - ADULT    • Maintains optimal cardiac output and hemodynamic stability Progressing    • Absence of cardiac arrhythmias or at baseline    For angiogram today Progressing        Diabetes/Glucose Control    • Glucose maintained within prescrib

## 2018-06-27 NOTE — PLAN OF CARE
CARDIOVASCULAR - ADULT    • Absence of cardiac arrhythmias or at baseline Progressing        METABOLIC/FLUID AND ELECTROLYTES - ADULT    • Electrolytes maintained within normal limits Progressing        RESPIRATORY - ADULT    • Achieves optimal ventilation

## 2018-06-27 NOTE — PROGRESS NOTES
[facility]  Urology Progress Note    Donna Fulton Patient Status:  Inpatient    10/3/1927 MRN [id number]   [facility] 2NE-A Attending Ann Hill MD   Hosp Day # 15 PCP Fatima Knack MD [facility]     Subjective:  5 [facility]  is a(n stent    Fatima catheter may be removed when patient is ready and he can resume CIC.     Patient to follow-up with his urologist (Dr. Yahaira Ramirez) after discharge    Will sign off; please call office if any further questions/concerns    Above discussed with n

## 2018-06-27 NOTE — PROGRESS NOTES
[facility]  Cardiology Progress Note    Marymery Holley [place]ago Patient Status:  Inpatient    10/3/1927 MRN [id number]   [facility] 2NE-A Attending Jesus Preston MD   Hosp Day # 15 PCP Erroll Dries MD [facility]     Subjective:  Sitting up in justine normal,   Lungs: crackles right base  Abdomen: Soft, non-tender. Extremities: no edema  Skin: Warm and dry.      Medications:  • iron sucrose  200 mg Intravenous Daily   • Clopidogrel Bisulfate  75 mg Oral Daily   • vancomycin  1,000 mg Intravenous Q48H

## 2018-06-27 NOTE — CARDIAC REHAB
Cad Teaching complete with Pt and daughter .  Cardiac rehab offered pt plans to go home with [facility] and PT Stent card given to pt

## 2018-06-27 NOTE — DIETARY NOTE
Nutrition Short Note    Dietitian consult received per cardiac rehab/CHF protocol. Pt to be educated by cardiac rehab staff and encouraged to attend outpatient classes taught by RD. JACKLYN available PRN.     Laila Lopez MA, RD, LDN

## 2018-06-27 NOTE — PROGRESS NOTES
[facility]  Progress Note     5 [facility]  Patient Status:  Inpatient    10/3/1927 MRN [id number]   [facility] 2NE-A Attending Mac Tsang MD   Hosp Day # 15 PCP Clarice Rojas MD [address] [place] [address]     Chief Complaint: chest pain    S: Epic.    Medications:   • furosemide  40 mg Intravenous Once   • furosemide  40 mg Intravenous BID (Diuretic)   • iron sucrose  200 mg Intravenous Daily   • Clopidogrel Bisulfate  75 mg Oral Daily   • epoetin manolo  10,000 Units Subcutaneous Q7 Days   • ipr

## 2018-06-27 NOTE — PROGRESS NOTES
[facility] dosing service    Follow-up Pharmacokinetic Consult for Vancomycin Dosing     Hanna Rey is a 80year old male admitted on 6/13/18 who is being treated for UTI. Patient is on day 11 of Vancomycin 1.25 gm IV Q 48 hours.   Goal trough is 10- function. 4.  Pharmacy will follow and monitor renal function changes, toxicity and efficacy.     Katie Lynch, PharmD  6/26/2018  7:55 PM  [address] [facility]: [phone]

## 2018-06-27 NOTE — PROCEDURES
[facility]    PATIENT'S NAME: Tristen Reilly   ATTENDING PHYSICIAN: Kasey Berg M.D. OPERATING PHYSICIAN: Kasey Berg M.D.    PATIENT ACCOUNT#:   [de-identified]    LOCATION:  [facility] 13 EDWP 10  MEDICAL RECORD #:   [id number]       DATE OF BIRTH

## 2018-06-27 NOTE — PROGRESS NOTES
At 0500, patient c/o SOB, tachypneic, labored breathing. Lung sounds diminished. 2L NC applied w/ SpO2 90-93%. RT called & nebulizer treatment given. Initially patient reported relief, started c/o SOB again a few minutes later. Dr. Genesis Scales paged & notified.

## 2018-06-28 ENCOUNTER — APPOINTMENT (OUTPATIENT)
Dept: GENERAL RADIOLOGY | Facility: HOSPITAL | Age: 83
DRG: 246 | End: 2018-06-28
Attending: INTERNAL MEDICINE
Payer: MEDICARE

## 2018-06-28 LAB
BUN BLD-MCNC: 43 MG/DL (ref 8–20)
CALCIUM BLD-MCNC: 7 MG/DL (ref 8.3–10.3)
CHLORIDE: 111 MMOL/L (ref 101–111)
CO2: 23 MMOL/L (ref 22–32)
CREAT BLD-MCNC: 2.97 MG/DL (ref 0.7–1.3)
ERYTHROCYTE [DISTWIDTH] IN BLOOD BY AUTOMATED COUNT: 14.9 % (ref 11.5–16)
GLUCOSE BLD-MCNC: 115 MG/DL (ref 70–99)
HCT VFR BLD AUTO: 27 % (ref 37–53)
HGB BLD-MCNC: 8.3 G/DL (ref 13–17)
LACTIC ACID: 1.2 MMOL/L (ref 0.5–2)
MCH RBC QN AUTO: 29.9 PG (ref 27–33.2)
MCHC RBC AUTO-ENTMCNC: 30.7 G/DL (ref 31–37)
MCV RBC AUTO: 97.1 FL (ref 80–99)
PLATELET # BLD AUTO: 212 10(3)UL (ref 150–450)
POTASSIUM SERPL-SCNC: 3.9 MMOL/L (ref 3.6–5.1)
PROCALCITONIN SERPL-MCNC: 0.15 NG/ML
RBC # BLD AUTO: 2.78 X10(6)UL (ref 3.8–5.8)
RED CELL DISTRIBUTION WIDTH-SD: 52.8 FL (ref 35.1–46.3)
SED RATE-ML: 56 MM/HR (ref 0–12)
SODIUM SERPL-SCNC: 143 MMOL/L (ref 136–144)
WBC # BLD AUTO: 9.3 X10(3) UL (ref 4–13)

## 2018-06-28 PROCEDURE — 71045 X-RAY EXAM CHEST 1 VIEW: CPT | Performed by: INTERNAL MEDICINE

## 2018-06-28 PROCEDURE — 99232 SBSQ HOSP IP/OBS MODERATE 35: CPT | Performed by: HOSPITALIST

## 2018-06-28 PROCEDURE — 99233 SBSQ HOSP IP/OBS HIGH 50: CPT | Performed by: INTERNAL MEDICINE

## 2018-06-28 NOTE — PROGRESS NOTES
[facility]  Progress Note    5 [facility]  Patient Status:  Inpatient    10/3/1927 MRN [id number]   [facility] 2NE-A Attending Gaurang Godinez MD   Hosp Day # 15 PCP Carmen Comas MD [facility]     ASSESSMENT     · Dyspnea due to fluid Nausea vomiting and diarrhea     Elevated serum creatinine     Chest pain     Hydronephrosis of left kidney     MRSA (methicillin resistant staph aureus) culture positive     Hyperkalemia     Acute renal failure superimposed on stage 3 chronic kidney disea 17*  19*  19*  18*  18*  19*  19*  18*   CA  7.0*  6.7*  6.6*  6.6*  7.0*   NA  139  144  145*  144  143   K  4.5  4.0  4.4  4.5  3.9   CL  116*  113*  112*  113*  111   CO2  15.0*  23.0  23.0  21.0*  23.0     @MG@      Lab Results  Component Value Date

## 2018-06-28 NOTE — DIETARY NOTE
NUTRITION FOLLOW UP ASSESSMENT    Pt is at moderate nutrition risk. Pt does not meet malnutrition criteria.     NUTRITION DIAGNOSIS/PROBLEM:    Inadequate energy intake related to physiological causes as evidenced by reported poor intake x 1 mo & 5% wt loss over the past 4 weeks. Usually he eats very well - makes his own brkfst and DTR prepares lunch & dinner. Has been skipping brkfst recently. Poor drinker - usually 30oz is a good day. No questions re: diet at this time.   Pt dislikes most ONS - willing to

## 2018-06-28 NOTE — PROGRESS NOTES
[facility]  Cardiology Progress Note    Vanessa Fulton Patient Status:  Inpatient    10/3/1927 MRN [id number]   [facility] 2NE-A Attending Arnel Pettit MD   Hosp Day # 15 PCP Gerhard Horseman MD [facility]     Subjective: Patient with Crawford Shanae Transdermal Daily   • Alfuzosin HCl ER  10 mg Oral Daily with breakfast   • bicalutamide  50 mg Oral Daily   • finasteride  5 mg Oral Daily   • mirtazapine  15 mg Oral Nightly   • atorvastatin  20 mg Oral Nightly   • famoTIDine  20 mg Oral QANICOLE CARLOS         A

## 2018-06-28 NOTE — PROGRESS NOTES
[facility]  Progress Note    [address] [facility] Patient Status:  Inpatient    10/3/1927 MRN [id number]   [facility] 2NE-A Attending Nam Machado MD   Hosp Day # 15 PCP Aron Meckel MD [facility]     Subjective:  5 [facility] Rita Lovelace is a(n) 4 yemartina Prostate cancer (HCC)     Nausea vomiting and diarrhea     Elevated serum creatinine     Chest pain     Hydronephrosis of left kidney     MRSA (methicillin resistant staph aureus) culture positive     Hyperkalemia     Acute renal failure superimposed on st [facility]  6/28/2018  11:57 AM

## 2018-06-28 NOTE — PROGRESS NOTES
[facility]  Progress Note     5 [facility]  Patient Status:  Inpatient    10/3/1927 MRN [id number]   [facility] 2NE-A Attending Pierre Lynne MD   Hosp Day # 13 PCP Kristen Norton MD [facility]     Chief Complaint: chest pain    S: 15.6*   INR  1.20*  1.19*       No results for input(s): TROP, CK in the last 168 hours. Imaging: Imaging data reviewed in Epic.     Medications:   • metoprolol tartrate  12.5 mg Oral 2x Daily(Beta Blocker)   • furosemide  40 mg Intravenous BID (Diu and daughter in detail at bedside, all questions answered       Mery Severino M.D.   Ana Alfaro

## 2018-06-28 NOTE — PROGRESS NOTES
[facility]  Nephrology Progress Note    5 [facility]  Attending:  Tila Roberts MD       Assessment and Plan:    1) ETHAN- due to obstructive uropathy + cardiorenal syndrome; likely volume up by exam / CXR and SOB / Damián Millin.  Continue diuretics     2) CKD 3 clubbing, cyanosis; no edema  Neurologic: Cranial nerves grossly intact, moving all extremities  Skin: Warm and dry, no rashes       Labs:     Lab Results  Component Value Date   WBC 9.3 06/28/2018   HGB 8.3 06/28/2018   HCT 27.0 06/28/2018   .0 06/

## 2018-06-29 LAB
BUN BLD-MCNC: 43 MG/DL (ref 8–20)
CALCIUM BLD-MCNC: 7.1 MG/DL (ref 8.3–10.3)
CHLORIDE: 107 MMOL/L (ref 101–111)
CO2: 27 MMOL/L (ref 22–32)
CREAT BLD-MCNC: 2.94 MG/DL (ref 0.7–1.3)
GLUCOSE BLD-MCNC: 121 MG/DL (ref 70–99)
POTASSIUM SERPL-SCNC: 3.6 MMOL/L (ref 3.6–5.1)
SODIUM SERPL-SCNC: 143 MMOL/L (ref 136–144)

## 2018-06-29 PROCEDURE — 99233 SBSQ HOSP IP/OBS HIGH 50: CPT | Performed by: INTERNAL MEDICINE

## 2018-06-29 PROCEDURE — 99232 SBSQ HOSP IP/OBS MODERATE 35: CPT | Performed by: HOSPITALIST

## 2018-06-29 NOTE — PROGRESS NOTES
· [facility] Cardiology Progress Note     Subjective:  No chest pain and dyspnea improving daily though not back to baseline.   (He certainly looks much better than this past weekend)    Objective:  100/65  Afebrile  SR with rare PVC  I/O -2425   BUN/cr 4

## 2018-06-29 NOTE — PROGRESS NOTES
Heart Failure Coordinator Progress Note    Patient was evaluated by the Heart Failure Coordinator for understanding, verbalization, demonstration, and recall of education related to heart failure, overall adherence to the behaviors Suleiman Steele

## 2018-06-29 NOTE — PROGRESS NOTES
[facility]  Progress Note    5 [facility]  Patient Status:  Inpatient    10/3/1927 MRN [id number]   [facility] 2NE-A Attending Gaurang Godinez MD   Hosp Day # 12 PCP Carmen Comas MD [facility]     ASSESSMENT     · Dyspnea due to fluid 175 lb (79.4 kg)      Physical Exam:  General: alert, oriented, no apparent distress  Heart: Regular rate and rhythm, normal S1S2  Lungs:  Diminished bs bilaterally, no wrc  Abdomen: soft, nontender, no guarding, no rebound, positive BS  Extremity:  no low (LASIX) injection 40 mg 40 mg Intravenous BID (Diuretic)   Clopidogrel Bisulfate (PLAVIX) tab 75 mg 75 mg Oral Daily   epoetin manolo (EPOGEN,PROCRIT) injection 10,000 Units 10,000 Units Subcutaneous Q7 Days   ipratropium-albuterol (DUONEB) nebulizer solutio

## 2018-06-29 NOTE — PROGRESS NOTES
[facility]  Urology Progress Note    Erick Fulton Patient Status:  Inpatient    10/3/1927 MRN [id number]   [facility] 2NE-A Attending Ankit Macias MD   Hosp Day # 12 PCP Marine Quiroga MD [facility]     Subjective:  5 [facility]  is a(n  - will need to see if procedure can be coordinated during this admission; alternatively can be done as an outpatient if otherwise ready to be discharged. Also spoke with Dr. Yadira Nuno surgery scheduler.       Fatima catheter may be removed when patien

## 2018-06-29 NOTE — PROGRESS NOTES
[facility]  Progress Note     5 [facility]  Patient Status:  Inpatient    10/3/1927 MRN [id number]   [facility] 2NE-A Attending Tila Roberts MD   Hosp Day # 12 PCP Benjy Evans MD [address] [place] [address]     Chief Complaint: chest pain    S: tartrate  12.5 mg Oral 2x Daily(Beta Blocker)   • furosemide  40 mg Intravenous BID (Diuretic)   • Clopidogrel Bisulfate  75 mg Oral Daily   • epoetin manolo  10,000 Units Subcutaneous Q7 Days   • ipratropium-albuterol  3 mL Nebulization QID   • aspirin  81

## 2018-06-29 NOTE — PROGRESS NOTES
[facility]  Nephrology Progress Note    5 [facility]  Attending:  Chastity Cruz MD       Assessment and Plan:    1) ETHAN- due to obstructive uropathy + cardiorenal syndrome; likely volume up by exam / CXR and SOB / Cookie Narrow but improved with diuretics- con dry, no rashes       Labs:     Lab Results  Component Value Date   CREATSERUM 2.94 06/29/2018   BUN 43 06/29/2018    06/29/2018   K 3.6 06/29/2018    06/29/2018   CO2 27.0 06/29/2018    06/29/2018   CA 7.1 06/29/2018       Imaging:   All

## 2018-06-30 LAB
BUN BLD-MCNC: 41 MG/DL (ref 8–20)
CALCIUM BLD-MCNC: 7 MG/DL (ref 8.3–10.3)
CHLORIDE: 106 MMOL/L (ref 101–111)
CO2: 27 MMOL/L (ref 22–32)
CREAT BLD-MCNC: 2.94 MG/DL (ref 0.7–1.3)
GLUCOSE BLD-MCNC: 105 MG/DL (ref 70–99)
POTASSIUM SERPL-SCNC: 3.8 MMOL/L (ref 3.6–5.1)
SODIUM SERPL-SCNC: 143 MMOL/L (ref 136–144)

## 2018-06-30 PROCEDURE — 99232 SBSQ HOSP IP/OBS MODERATE 35: CPT | Performed by: HOSPITALIST

## 2018-06-30 PROCEDURE — 99233 SBSQ HOSP IP/OBS HIGH 50: CPT | Performed by: INTERNAL MEDICINE

## 2018-06-30 NOTE — PROGRESS NOTES
· [facility] Cardiology Progress Note     Subjective:  Dyspnea improving daily, no chest pain    Objective:  90/57  92/61  Afebrile  SRbrief PAT   I/O -1425   BUN/cr  41/2.94    Cardiac: S1 S2 regular  Lungs: decreased BS bases no wheeze  Abdomen: softer

## 2018-06-30 NOTE — PROGRESS NOTES
[facility]  Nephrology Progress Note    5 [facility]  Attending:  Victory Goodell, MD         Current Facility-Administered Medications:  metoprolol tartrate (LOPRESSOR) partial tablet 12.5 mg 12.5 mg Oral 2x Daily(Beta Blocker)   furosemide (LASIX) inj no palpable organomegaly  Extremities: Without clubbing, cyanosis; no edema  Neurologic: Cranial nerves grossly intact, moving all extremities  Skin: Warm and dry, no rashes     Recent Labs   06/28/18  0545   WBC 9.3   HGB 8.3*   MCV 97.1   .0

## 2018-06-30 NOTE — PROGRESS NOTES
[address] [facility] Patient Status:  Inpatient    10/3/1927 MRN [id number]   [facility] 2NE-A Attending Pierre Lynne MD   Hosp Day # 16 PCP Judyann Sers MD [facility]     Subjective:   Interval History:   Pt asleep    Scheduled

## 2018-06-30 NOTE — PLAN OF CARE
Problem: RESPIRATORY - ADULT  Goal: Achieves optimal ventilation and oxygenation  INTERVENTIONS:  - Assess for changes in respiratory status  - Assess for changes in mentation and behavior  - Position to facilitate oxygenation and minimize respiratory effo and stability  - Promote increasing activity/tolerance for mobility and gait  - Educate and engage patient/family in tolerated activity level and precautions  - Recommend use of RW for transfers and ambulation   Outcome: Progressing  UP TO CHAIR.  AMBULATED

## 2018-07-01 LAB
BUN BLD-MCNC: 41 MG/DL (ref 8–20)
CALCIUM BLD-MCNC: 7.2 MG/DL (ref 8.3–10.3)
CHLORIDE: 106 MMOL/L (ref 101–111)
CO2: 24 MMOL/L (ref 22–32)
CREAT BLD-MCNC: 2.94 MG/DL (ref 0.7–1.3)
GLUCOSE BLD-MCNC: 111 MG/DL (ref 70–99)
HAV IGM SER QL: 2 MG/DL (ref 1.8–2.5)
POTASSIUM SERPL-SCNC: 4.1 MMOL/L (ref 3.6–5.1)
SODIUM SERPL-SCNC: 140 MMOL/L (ref 136–144)

## 2018-07-01 PROCEDURE — 99231 SBSQ HOSP IP/OBS SF/LOW 25: CPT | Performed by: HOSPITALIST

## 2018-07-01 PROCEDURE — 99233 SBSQ HOSP IP/OBS HIGH 50: CPT | Performed by: INTERNAL MEDICINE

## 2018-07-01 RX ORDER — FUROSEMIDE 40 MG/1
40 TABLET ORAL DAILY
Status: DISCONTINUED | OUTPATIENT
Start: 2018-07-02 | End: 2018-07-02

## 2018-07-01 NOTE — PLAN OF CARE
Assisted to the bathroom, complained of dizziness,while ambulating  Was still lightheaded after resting in chair, bP 94/58,skin warm and  Dry, HR 90'S  Tele shows SR

## 2018-07-01 NOTE — PHYSICAL THERAPY NOTE
PHYSICAL THERAPY TREATMENT NOTE - INPATIENT    Room Number: 3770/2176-G     Session: 2   Number of Visits to Meet Established Goals: 5    Presenting Problem: Chest pain    Problem List  Principal Problem:    Chest pain  Active Problems:    Elevated serum Static Sitting: Good  Dynamic Sitting: Fair +           Static Standing: Fair -  Dynamic Standing: Poor +    ACTIVITY TOLERANCE  Room air    AM-PAC '6-Cli took a seated rest period. Pt had a lot of questions re: ambulating here x 3 with staff, therapist highly encouraged this. Educated pt on performing exercises even without therapy to combat weakness due to long hospital stay.  Pt educated extensively on deborah

## 2018-07-01 NOTE — PROGRESS NOTES
[facility]  Progress Note    5 [facility]  Patient Status:  Inpatient    10/3/1927 MRN [id number]   [facility] 2NE-A Attending Patience MD Tasia   Hosp Day # 25 PCP Laine Evener SR MD [facility]     Subjective: dyspnea improved with diuresi --    --        Recent Labs   Lab  06/27/18   0551  06/28/18   0545  06/29/18   0600  06/30/18   0558  07/01/18   0625   NA  144  143  143  143  140   K  4.5  3.9  3.6  3.8  4.1   CL  113*  111  107  106  106   CO2  21.0*  23.0  27.0  27.0  24.0   BUN  42* Amado Elliott MD on 6/21/2018 at 15:57     Approved by: Amado Elliott MD                 Ct Abdomen+pelvis Kidneystone 2d Rndr(no Iv,no Oral)(cpt=74176)    Result Date: 6/14/2018  PROCEDURE:  CT ABDOMEN/PELVIS KIDNEYSTONE 2D RNDR (NO IV,NO ORAL) bilateral basilar atelectasis/infiltrates. Interstitial opacities bilaterally. No pneumothorax. Atheromatous calcifications of the aorta. Stable right parahilar opacities. CONCLUSION:  1.   Small bilateral pleural effusions with bilateral basilar a ipratropium-albuterol (DUONEB) nebulizer solution 3 mL 3 mL Nebulization QID   aspirin chewable tab 81 mg 81 mg Oral Daily   acetaminophen (TYLENOL EXTRA STRENGTH) tab 500 mg 500 mg Oral Q6H PRN   Alfuzosin HCl ER (UROXATRAL) 24 hr tab 10 mg 10 mg Oral D bleed on Plavix, but needs to be continue for recent MERLYN stent  · Procedure should be dobe on Plavix with recent drug coated stent last week - otherwise risk of instent thrombosis early after PCI  · Low dose Lopressor as BP allows  · Avoid hypotension with

## 2018-07-01 NOTE — PLAN OF CARE
Pt received at 2330 alert/oriented x4, OSEI, following all commands. Pt denies any SOB except with exertion. Lungs clear, diminished in bases bilaterally. Left lateral nephrostomy tube draining clear yellow urine. Site CDI, no drainage or redness noted.  VSS

## 2018-07-01 NOTE — PROGRESS NOTES
[facility]  Nephrology Progress Note    5 [facility]  Attending:  Nam Machado MD     No acute issues overnight        Current Facility-Administered Medications:  metoprolol tartrate (LOPRESSOR) partial tablet 12.5 mg 12.5 mg Oral 2x Daily(Beta Bloc Without clubbing, cyanosis; no edema  Neurologic: Cranial nerves grossly intact, moving all extremities  Skin: Warm and dry, no rashes   No results for input(s): WBC, HGB, MCV, PLT, BAND, INR in the last 72 hours.     Invalid input(s): LYM#, MONO#, BASOS#,

## 2018-07-01 NOTE — PROGRESS NOTES
[facility]  Progress Note     5 [facility]  Patient Status:  Inpatient    10/3/1927 MRN [id number]   [facility] 2NE-A Attending Suzette Clinton MD   Hosp Day # 25 PCP Angela Perez MD [address] [place] [address]     Chief Complaint: chest pain    S: (Diuretic)   • Clopidogrel Bisulfate  75 mg Oral Daily   • epoetin manolo  10,000 Units Subcutaneous Q7 Days   • ipratropium-albuterol  3 mL Nebulization QID   • aspirin  81 mg Oral Daily   • Alfuzosin HCl ER  10 mg Oral Daily with breakfast   • bicalutamide

## 2018-07-02 ENCOUNTER — APPOINTMENT (OUTPATIENT)
Dept: CV DIAGNOSTICS | Facility: HOSPITAL | Age: 83
DRG: 246 | End: 2018-07-02
Attending: INTERNAL MEDICINE
Payer: MEDICARE

## 2018-07-02 ENCOUNTER — ANESTHESIA (OUTPATIENT)
Dept: SURGERY | Facility: HOSPITAL | Age: 83
DRG: 246 | End: 2018-07-02
Payer: MEDICARE

## 2018-07-02 ENCOUNTER — ANESTHESIA EVENT (OUTPATIENT)
Dept: SURGERY | Facility: HOSPITAL | Age: 83
DRG: 246 | End: 2018-07-02
Payer: MEDICARE

## 2018-07-02 LAB
BASOPHILS # BLD AUTO: 0.05 X10(3) UL (ref 0–0.1)
BASOPHILS NFR BLD AUTO: 0.7 %
BUN BLD-MCNC: 38 MG/DL (ref 8–20)
CALCIUM BLD-MCNC: 7 MG/DL (ref 8.3–10.3)
CHLORIDE: 105 MMOL/L (ref 101–111)
CO2: 28 MMOL/L (ref 22–32)
CREAT BLD-MCNC: 2.83 MG/DL (ref 0.7–1.3)
EOSINOPHIL # BLD AUTO: 0.3 X10(3) UL (ref 0–0.3)
EOSINOPHIL NFR BLD AUTO: 4.3 %
ERYTHROCYTE [DISTWIDTH] IN BLOOD BY AUTOMATED COUNT: 15.3 % (ref 11.5–16)
GLUCOSE BLD-MCNC: 110 MG/DL (ref 70–99)
HCT VFR BLD AUTO: 24.3 % (ref 37–53)
HGB BLD-MCNC: 7.5 G/DL (ref 13–17)
IMMATURE GRANULOCYTE COUNT: 0.04 X10(3) UL (ref 0–1)
IMMATURE GRANULOCYTE RATIO %: 0.6 %
LYMPHOCYTES # BLD AUTO: 1.28 X10(3) UL (ref 0.9–4)
LYMPHOCYTES NFR BLD AUTO: 18.5 %
MCH RBC QN AUTO: 29.9 PG (ref 27–33.2)
MCHC RBC AUTO-ENTMCNC: 30.9 G/DL (ref 31–37)
MCV RBC AUTO: 96.8 FL (ref 80–99)
MONOCYTES # BLD AUTO: 0.48 X10(3) UL (ref 0.1–1)
MONOCYTES NFR BLD AUTO: 6.9 %
NEUTROPHIL ABS PRELIM: 4.78 X10 (3) UL (ref 1.3–6.7)
NEUTROPHILS # BLD AUTO: 4.78 X10(3) UL (ref 1.3–6.7)
NEUTROPHILS NFR BLD AUTO: 69 %
PLATELET # BLD AUTO: 173 10(3)UL (ref 150–450)
POTASSIUM SERPL-SCNC: 3.9 MMOL/L (ref 3.6–5.1)
RBC # BLD AUTO: 2.51 X10(6)UL (ref 3.8–5.8)
RED CELL DISTRIBUTION WIDTH-SD: 52.3 FL (ref 35.1–46.3)
SODIUM SERPL-SCNC: 142 MMOL/L (ref 136–144)
WBC # BLD AUTO: 6.9 X10(3) UL (ref 4–13)

## 2018-07-02 PROCEDURE — 93018 CV STRESS TEST I&R ONLY: CPT | Performed by: INTERNAL MEDICINE

## 2018-07-02 PROCEDURE — 93350 STRESS TTE ONLY: CPT | Performed by: INTERNAL MEDICINE

## 2018-07-02 PROCEDURE — 93017 CV STRESS TEST TRACING ONLY: CPT | Performed by: INTERNAL MEDICINE

## 2018-07-02 PROCEDURE — 99232 SBSQ HOSP IP/OBS MODERATE 35: CPT | Performed by: INTERNAL MEDICINE

## 2018-07-02 PROCEDURE — 99233 SBSQ HOSP IP/OBS HIGH 50: CPT | Performed by: INTERNAL MEDICINE

## 2018-07-02 RX ORDER — DOBUTAMINE HYDROCHLORIDE 100 MG/100ML
INJECTION INTRAVENOUS
Status: DISCONTINUED
Start: 2018-07-02 | End: 2018-07-02

## 2018-07-02 RX ORDER — POTASSIUM CHLORIDE 20 MEQ/1
20 TABLET, EXTENDED RELEASE ORAL ONCE
Status: COMPLETED | OUTPATIENT
Start: 2018-07-02 | End: 2018-07-02

## 2018-07-02 RX ORDER — TORSEMIDE 20 MG/1
40 TABLET ORAL DAILY
Status: DISCONTINUED | OUTPATIENT
Start: 2018-07-02 | End: 2018-07-04

## 2018-07-02 RX ORDER — IPRATROPIUM BROMIDE AND ALBUTEROL SULFATE 2.5; .5 MG/3ML; MG/3ML
3 SOLUTION RESPIRATORY (INHALATION)
Status: DISCONTINUED | OUTPATIENT
Start: 2018-07-02 | End: 2018-07-06

## 2018-07-02 NOTE — PROGRESS NOTES
[facility]  Progress Note     [facility]  Patient Status:  Inpatient    10/3/1927 MRN [id number]   [facility] 2NE-A Attending Victory Goodell, MD   Hosp Day # 23 PCP Rosa Boo MD [address] [place] [address]     Chief Complaint: chest pain    S: Days   • ipratropium-albuterol  3 mL Nebulization QID   • aspirin  81 mg Oral Daily   • Alfuzosin HCl ER  10 mg Oral Daily with breakfast   • bicalutamide  50 mg Oral Daily   • finasteride  5 mg Oral Daily   • mirtazapine  15 mg Oral Nightly   • atorvastat

## 2018-07-02 NOTE — PHYSICAL THERAPY NOTE
PHYSICAL THERAPY TREATMENT NOTE - INPATIENT    Room Number: 1015/2288-K     Session: 3  Number of Visits to Meet Established Goals: 5    Presenting Problem: Chest pain    Problem List  Principal Problem:    Chest pain  Active Problems:    Elevated serum c None                PAIN ASSESSMENT   Ratin          BALANCE              Static Sitting: Good  Dynamic Sitting: Fair +           Static Standing: Fair -  Dynamic Standing: Fair -    ACTIVITY TOLERANCE  O2 Saturation at rest 100% and with activity 87-8 flexion)  Stoop/Curb Assistance: Not tested  Comment : scores based on FIM definition per dept policy    Skilled Therapy Provided: Pt approached for therapy standing with RW and occupational therapist in room.  Pt ambulated 150 feet with use of a RW with de

## 2018-07-02 NOTE — PROGRESS NOTES
[facility]  Cardiology Progress Note    Unknown [facility] Patient Status:  Inpatient    10/3/1927 MRN [id number]   [facility] 2NE-A Attending Marly Terry MD   Hosp Day # 23 PCP Sherrye Hides MD [facility]     Subjective:  Sitting in the  Oral Daily   • metoprolol tartrate  12.5 mg Oral 2x Daily(Beta Blocker)   • Clopidogrel Bisulfate  75 mg Oral Daily   • epoetin manolo  10,000 Units Subcutaneous Q7 Days   • ipratropium-albuterol  3 mL Nebulization QID   • aspirin  81 mg Oral Daily   • Alfuz aortic stenosis and if any contractile reserve. As outlined previous, elevated risk for procedure from cardiac standpoint but as best optimized from re-vascularization and volume standpoint to proceed with proposed urologic procedure.   Recommend careful m

## 2018-07-02 NOTE — PROGRESS NOTES
[facility]  Nephrology Progress Note    5 [facility]  Attending:  Carissa Tineo MD       Assessment and Plan:    1) ETHAN- due to obstructive uropathy + cardiorenal syndrome; diuresed well over last several days- SOB / Boris Spotted much improved- transitioned t 6.9 07/02/2018   HGB 7.5 07/02/2018   HCT 24.3 07/02/2018   .0 07/02/2018   CREATSERUM 2.83 07/02/2018   BUN 38 07/02/2018    07/02/2018   K 3.9 07/02/2018    07/02/2018   CO2 28.0 07/02/2018    07/02/2018   CA 7.0 07/02/2018

## 2018-07-02 NOTE — PROGRESS NOTES
[facility]  Progress Note    5 [facility]  Patient Status:  Inpatient    10/3/1927 MRN [id number]   [facility] 2NE-A Attending Marly Terry MD   Hosp Day # 23 PCP Sherrye Hides MD [place]     ASSESSMENT     · Dyspnea due to fluid Functional diarrhea     Prostate cancer (HCC)     Nausea vomiting and diarrhea     Elevated serum creatinine     Chest pain     Hydronephrosis of left kidney     MRSA (methicillin resistant staph aureus) culture positive     Hyperkalemia     Acute renal fa 2.94*  2.94*  2.94*  2.83*   GFRAA  20*  21*  21*  21*  22*   GFRNAA  18*  18*  18*  18*  19*   CA  7.0*  7.1*  7.0*  7.2*  7.0*   NA  143  143  143  140  142   K  3.9  3.6  3.8  4.1  3.9   CL  111  107  106  106  105   CO2  23.0  27.0  27.0  24.0  28.0

## 2018-07-02 NOTE — PROGRESS NOTES
CARDIODIAGNOSTIC PRELIMINARY REPORT:     DOBUTAMINE Challenge completed, titrating up to maximum dose of 20 mcs/kg/min; patient tolerated with no complaints of cardiac symptoms;    Baseline:  88/60, HR: 91;   Peak: 94/60, HR: 108    Total infusion time:  2

## 2018-07-02 NOTE — ANESTHESIA PREPROCEDURE EVALUATION
PRE-OP EVALUATION    Patient Name: Merary Lu    Pre-op Diagnosis: * No pre-op diagnosis entered *    * No procedures listed *    * No surgeons found in log *    Pre-op vitals reviewed.   Temp: 97.7 °F (36.5 °C)  Pulse: 89  Resp: 20  BP: 97/54  SpO2: 98 (DEFINITY) 6.52 MG/ML injection 1.5 mL 1.5 mL Intravenous ONCE PRN   [COMPLETED] fentaNYL citrate (SUBLIMAZE) 0.05 MG/ML injection      [COMPLETED] Lidocaine HCl (PF) (XYLOCAINE) 1 % injection SOLN      [COMPLETED] iohexol (OMNIPAQUE) 350 MG/ML injection 5 into the skin every 30 (thirty) days. Disp:  Rfl:    finasteride (PROSCAR) 5 MG Oral Tab Take 5 mg by mouth daily.    Disp:  Rfl:    VITAMIN A 10,000 units daily at bedtime Disp:  Rfl:        Allergies: Duricef [Cefadroxil]      Anesthesia Evaluation    P Value Date    07/02/2018   K 3.9 07/02/2018    07/02/2018   CO2 28.0 07/02/2018   BUN 38 (H) 07/02/2018   CREATSERUM 2.83 (H) 07/02/2018    (H) 07/02/2018   CA 7.0 (L) 07/02/2018       Lab Results  Component Value Date   INR 1.19 (H) 06/

## 2018-07-02 NOTE — PROGRESS NOTES
[facility]    Progress Note    [address] Patient Status:  Inpatient    10/3/1927 MRN [id number]   [facility] 2NE-A Attending Elinor York MD   Hosp Day # 23 PCP Josephina Poles MD [place]       Subjective:   [address] is a(n) 9 Juanjo De Leon PA-C  Department of Urology  [facility]  7/2/2018

## 2018-07-02 NOTE — CM/SW NOTE
Interdisciplinary Rounds: 07/02/18  Admitted: 6/13/2018 LOS: 23  Disciplines in attendance: charge nurse, staff nurse, CM, [address] and discharge plan reviewed.     Patient Active Problem List:     Sepsis ([facility] [address]     UTI (lower urinary tract infection) Dizzy w/ambulation, hypotension. Med changes. Still requires cystoscopy w/ laser lithotripsy and stone extraction/possible stent placement. Discharge Plan: home with daughter/allegra and residential home health.     Marisol Dent RN,   Phon

## 2018-07-02 NOTE — OCCUPATIONAL THERAPY NOTE
OCCUPATIONAL THERAPY QUICK EVALUATION - INPATIENT    Room Number: 0022/9277-R  Evaluation Date: 7/2/2018     Type of Evaluation: Quick Eval  Presenting Problem: chest pain    Physician Order: IP Consult to Occupational Therapy  Reason for Therapy:  ADL/IAD prostate CA: straight caths   • Hearing impairment    • Hearing loss    • Hypotension    • Indigestion    • Neuropathy    • Osteoarthritis    • Osteoporosis    • Pain in joints    • PERIPHERAL VASCULAR DISEASE 2007    left leg bypass sx   • Peripheral vasc extremity strength is within functional limits     NEUROLOGICAL FINDINGS  Neurological Findings: Coordination - Rapid Alternating Movement; Coordination - Finger to Nose;Coordination - Finger Opposition  Coordination - Finger to Nose: Symmetrical  Coordinat creatinine,renal consult pre cath Elevated serum creatinine IN PATIENT Obstruction of left ureteropelvic junction (UPJ) due to stone [N20.1]. Complete medical history and occupational profile noted above. Pt DC from IP OT services at this time.     Patient

## 2018-07-03 ENCOUNTER — SURGERY (OUTPATIENT)
Age: 83
End: 2018-07-03

## 2018-07-03 LAB
ALBUMIN: 2.9 G/DL
ALKALINE PHOSPHATATE(ALK PHOS): 67 IU/L
BASOPHILS # BLD AUTO: 0.06 X10(3) UL (ref 0–0.1)
BASOPHILS NFR BLD AUTO: 0.8 %
BILIRUBIN TOTAL: 0.2 MG/DL
BUN BLD-MCNC: 40 MG/DL (ref 8–20)
BUN: 37 MG/DL
CALCIUM BLD-MCNC: 7.6 MG/DL (ref 8.3–10.3)
CALCIUM: 8.2 MG/DL
CHLORIDE: 107 MMOL/L (ref 101–111)
CHLORIDE: 115 MEQ/L
CO2: 28 MMOL/L (ref 22–32)
CREAT BLD-MCNC: 2.93 MG/DL (ref 0.7–1.3)
CREATININE, SERUM: 2.74 MG/DL
EOSINOPHIL # BLD AUTO: 0.28 X10(3) UL (ref 0–0.3)
EOSINOPHIL NFR BLD AUTO: 3.8 %
ERYTHROCYTE [DISTWIDTH] IN BLOOD BY AUTOMATED COUNT: 15.6 % (ref 11.5–16)
GLUCOSE BLD-MCNC: 109 MG/DL (ref 70–99)
GLUCOSE: 102 MG/DL
HCT VFR BLD AUTO: 25.9 % (ref 37–53)
HGB BLD-MCNC: 7.8 G/DL (ref 13–17)
IMMATURE GRANULOCYTE COUNT: 0.05 X10(3) UL (ref 0–1)
IMMATURE GRANULOCYTE RATIO %: 0.7 %
LYMPHOCYTES # BLD AUTO: 1.53 X10(3) UL (ref 0.9–4)
LYMPHOCYTES NFR BLD AUTO: 20.8 %
MCH RBC QN AUTO: 29.5 PG (ref 27–33.2)
MCHC RBC AUTO-ENTMCNC: 30.1 G/DL (ref 31–37)
MCV RBC AUTO: 98.1 FL (ref 80–99)
MONOCYTES # BLD AUTO: 0.5 X10(3) UL (ref 0.1–1)
MONOCYTES NFR BLD AUTO: 6.8 %
NEUTROPHIL ABS PRELIM: 4.93 X10 (3) UL (ref 1.3–6.7)
NEUTROPHILS # BLD AUTO: 4.93 X10(3) UL (ref 1.3–6.7)
NEUTROPHILS NFR BLD AUTO: 67.1 %
PLATELET # BLD AUTO: 193 10(3)UL (ref 150–450)
POTASSIUM SERPL-SCNC: 4.2 MMOL/L (ref 3.6–5.1)
POTASSIUM, SERUM: 4.4 MEQ/L
PROTEIN, TOTAL: 6.7 G/DL
RBC # BLD AUTO: 2.64 X10(6)UL (ref 3.8–5.8)
RED CELL DISTRIBUTION WIDTH-SD: 53.6 FL (ref 35.1–46.3)
SGOT (AST): 12 IU/L
SGPT (ALT): 13 IU/L
SODIUM SERPL-SCNC: 143 MMOL/L (ref 136–144)
SODIUM: 143 MEQ/L
WBC # BLD AUTO: 7.4 X10(3) UL (ref 4–13)

## 2018-07-03 PROCEDURE — 0TJB8ZZ INSPECTION OF BLADDER, VIA NATURAL OR ARTIFICIAL OPENING ENDOSCOPIC: ICD-10-PCS | Performed by: UROLOGY

## 2018-07-03 PROCEDURE — 99232 SBSQ HOSP IP/OBS MODERATE 35: CPT | Performed by: INTERNAL MEDICINE

## 2018-07-03 RX ORDER — MORPHINE SULFATE 4 MG/ML
2 INJECTION, SOLUTION INTRAMUSCULAR; INTRAVENOUS EVERY 5 MIN PRN
Status: DISCONTINUED | OUTPATIENT
Start: 2018-07-03 | End: 2018-07-03 | Stop reason: HOSPADM

## 2018-07-03 RX ORDER — NALOXONE HYDROCHLORIDE 0.4 MG/ML
80 INJECTION, SOLUTION INTRAMUSCULAR; INTRAVENOUS; SUBCUTANEOUS AS NEEDED
Status: DISCONTINUED | OUTPATIENT
Start: 2018-07-03 | End: 2018-07-03 | Stop reason: HOSPADM

## 2018-07-03 RX ORDER — SODIUM CHLORIDE, SODIUM LACTATE, POTASSIUM CHLORIDE, CALCIUM CHLORIDE 600; 310; 30; 20 MG/100ML; MG/100ML; MG/100ML; MG/100ML
INJECTION, SOLUTION INTRAVENOUS CONTINUOUS
Status: DISCONTINUED | OUTPATIENT
Start: 2018-07-03 | End: 2018-07-03 | Stop reason: HOSPADM

## 2018-07-03 RX ORDER — MEPERIDINE HYDROCHLORIDE 25 MG/ML
12.5 INJECTION INTRAMUSCULAR; INTRAVENOUS; SUBCUTANEOUS AS NEEDED
Status: DISCONTINUED | OUTPATIENT
Start: 2018-07-03 | End: 2018-07-03 | Stop reason: HOSPADM

## 2018-07-03 RX ORDER — ONDANSETRON 2 MG/ML
4 INJECTION INTRAMUSCULAR; INTRAVENOUS AS NEEDED
Status: DISCONTINUED | OUTPATIENT
Start: 2018-07-03 | End: 2018-07-03 | Stop reason: HOSPADM

## 2018-07-03 RX ORDER — LIDOCAINE HYDROCHLORIDE 20 MG/ML
JELLY TOPICAL AS NEEDED
Status: DISCONTINUED | OUTPATIENT
Start: 2018-07-03 | End: 2018-07-03

## 2018-07-03 NOTE — OPERATIVE REPORT
[address]    PATIENT'S NAME: Dior Nance   ATTENDING PHYSICIAN: Karissa Jean M.D.    OPERATING PHYSICIAN: Musa Rossi DO   PATIENT ACCOUNT#:   [de-identified]    LOCATION:  [address] [place] [address]  MEDICAL RECORD #:   [id number]       DATE OF BIRTH lithotomy position. The patient's indwelling Fatima catheter was removed. His groin was prepped and draped in the usual sterile fashion.   A well-lubricated 21-German rigid cystoscope was then introduced through a normal anterior male urethra, through an o prostate cancer. We discussed options of long-term left nephrostomy tube versus potential future attempt at antegrade ureteroscopy via the nephrostomy site once the patient can safely be off of his Plavix after his recent coronary stent placement.      Dic

## 2018-07-03 NOTE — SLP NOTE
Patient off of the floor at this time for a  ureteroscopy. Will attempt swallowing evaluation later on this date.      Jameel Otero MA, [address] [facility]  Speech Language Pathologist  Pager# [phone]

## 2018-07-03 NOTE — DIETARY NOTE
NUTRITION FOLLOW UP ASSESSMENT    Pt is at moderate nutrition risk. Pt does not meet malnutrition criteria.     NUTRITION DIAGNOSIS/PROBLEM:    Inadequate energy intake related to physiological causes as evidenced by reported poor intake x 1 mo & 5% wt loss with Chest Pain. PMH includes PVD, CAD, Prostate CA, CKD. Pt with Acute on chronic CKD & recent nephrostomy tube placement. Per Dtr at bedside, pt has had a rough month with UTI, Skin CA, and Now nephrostomy placement.   He has had poor appetite, increased

## 2018-07-03 NOTE — SLP NOTE
Attempted to see patient for a bedside swallowing evaluation. Patient is sleeping and difficult to arouse following urology procedure. Patient has been an inpatient since June 13, 2018 with diet being regular with thin liquids.  Night RN noted patient with c Liquids: Nectar    Patient's daughter asked to not see patient today as he had the procedure and was tired. She does not think this would be his best day. Continue with his present diet when he is awake and participatory. SLP to follow up on 7/5/18.

## 2018-07-03 NOTE — PROGRESS NOTES
[facility]  Nephrology Progress Note    5 [facility]  Attending:  Guido Peña MD       Assessment and Plan:    1) ETHAN- due to obstructive uropathy + cardiorenal syndrome; diuresed well over last several days- SOB / [place] much improved- transitioned t WBC 7.4 07/03/2018   HGB 7.8 07/03/2018   HCT 25.9 07/03/2018   .0 07/03/2018   CREATSERUM 2.93 07/03/2018   BUN 40 07/03/2018    07/03/2018   K 4.2 07/03/2018    07/03/2018   CO2 28.0 07/03/2018    07/03/2018   CA 7.6 07/03/201

## 2018-07-03 NOTE — PROGRESS NOTES
[facility]  Progress Note    5 [facility]  Patient Status:  Inpatient    10/3/1927 MRN [id number]   [facility] 2NE-A Attending Boris Islas MD   Hosp Day # 20 PCP Gurmeet Pereyra MD [place]     ASSESSMENT     · Dyspnea due to fluid Functional diarrhea     Prostate cancer (HCC)     Nausea vomiting and diarrhea     Elevated serum creatinine     Chest pain     Hydronephrosis of left kidney     MRSA (methicillin resistant staph aureus) culture positive     Hyperkalemia     Acute renal fa 24.0  28.0  28.0     @MG@      Lab Results  Component Value Date   PT 14.3 10/17/2013   PT 14.2 10/15/2013   PT 13.7 10/14/2013   INR 1.19 (H) 06/26/2018   INR 1.20 (H) 06/25/2018   INR 1.11 (H) 06/18/2018        No results for input(s): TSH in the last 72

## 2018-07-03 NOTE — PROGRESS NOTES
[facility]  Progress Note     5 [facility]  Patient Status:  Inpatient    10/3/1927 MRN [id number]   [facility] 2NE-A Attending Pierre Lynne MD   Hosp Day # 21 PCP Kristen Norton MD [facility]     Chief Complaint: chest pain    S: Subcutaneous Q7 Days   • aspirin  81 mg Oral Daily   • Alfuzosin HCl ER  10 mg Oral Daily with breakfast   • bicalutamide  50 mg Oral Daily   • finasteride  5 mg Oral Daily   • mirtazapine  15 mg Oral Nightly   • atorvastatin  20 mg Oral Nightly   • famoTI

## 2018-07-03 NOTE — PROGRESS NOTES
[facility] Urology Preop    Patient seen in preop area with family at the bedside. Imaging and labs reviewed. We discussed the plan for today for cystoscopy, left retrograde pyelogram, left ureteroscopy, laser lithotripsy, left ureteral stent placement.     I di

## 2018-07-03 NOTE — BRIEF OP NOTE
Pre-Operative Diagnosis: Left hydronephrosis, left distal ureteral calculus, prostate cancer     Post-Operative Diagnosis: Same     Procedure Performed:   Procedure(s):  Cystoscopy      Surgeon(s) and Role:     Jet Mcpherson, DO - Primary    Assista

## 2018-07-03 NOTE — ANESTHESIA POSTPROCEDURE EVALUATION
10 [facility] Patient Status:  Inpatient   Age/Gender 80year old male MRN [id number]   [facility] SURGERY Attending Haris Allen MD   Hosp Day # 21 PCP Hiro Carter MD [address] [place] [address]       Anesthesia Post-op Note    Procedure(s

## 2018-07-03 NOTE — PROGRESS NOTES
Den[facility]  Cardiology Progress Note    [facility] Patient Status:  Inpatient    10/3/1927 MRN [id number]   [facility] 2NE-A Attending Chastity Cruz MD   Hosp Day # 20 PCP Danielle Pucker MD [place]     Subjective:  Feeling better, a Abdomen: Soft, non-tender. Extremities: No edema   Skin: Warm and dry.      Medications:  • torsemide  40 mg Oral Daily   • ipratropium-albuterol  3 mL Nebulization Q6H WA   • metoprolol tartrate  12.5 mg Oral 2x Daily(Beta Blocker)   • Clopidogrel Bisu independently. Note reviewed and labs reviewed. Agree with above assessment and plan. Seen after cystoscopy and unable to identify left ureteral entrance. Daughter and her  at bedside.   They will be discussing goals of care tomorrow since still

## 2018-07-04 LAB
BASOPHILS # BLD AUTO: 0.04 X10(3) UL (ref 0–0.1)
BASOPHILS NFR BLD AUTO: 0.5 %
BUN BLD-MCNC: 44 MG/DL (ref 8–20)
CALCIUM BLD-MCNC: 7.5 MG/DL (ref 8.3–10.3)
CHLORIDE: 106 MMOL/L (ref 101–111)
CO2: 28 MMOL/L (ref 22–32)
CREAT BLD-MCNC: 3.1 MG/DL (ref 0.7–1.3)
EOSINOPHIL # BLD AUTO: 0.04 X10(3) UL (ref 0–0.3)
EOSINOPHIL NFR BLD AUTO: 0.5 %
ERYTHROCYTE [DISTWIDTH] IN BLOOD BY AUTOMATED COUNT: 15.1 % (ref 11.5–16)
GLUCOSE BLD-MCNC: 97 MG/DL (ref 70–99)
HCT VFR BLD AUTO: 25.9 % (ref 37–53)
HGB BLD-MCNC: 7.8 G/DL (ref 13–17)
IMMATURE GRANULOCYTE COUNT: 0.04 X10(3) UL (ref 0–1)
IMMATURE GRANULOCYTE RATIO %: 0.5 %
LYMPHOCYTES # BLD AUTO: 1.2 X10(3) UL (ref 0.9–4)
LYMPHOCYTES NFR BLD AUTO: 15.4 %
MCH RBC QN AUTO: 29.5 PG (ref 27–33.2)
MCHC RBC AUTO-ENTMCNC: 30.1 G/DL (ref 31–37)
MCV RBC AUTO: 98.1 FL (ref 80–99)
MONOCYTES # BLD AUTO: 0.53 X10(3) UL (ref 0.1–1)
MONOCYTES NFR BLD AUTO: 6.8 %
NEUTROPHIL ABS PRELIM: 5.92 X10 (3) UL (ref 1.3–6.7)
NEUTROPHILS # BLD AUTO: 5.92 X10(3) UL (ref 1.3–6.7)
NEUTROPHILS NFR BLD AUTO: 76.3 %
PLATELET # BLD AUTO: 181 10(3)UL (ref 150–450)
POTASSIUM SERPL-SCNC: 4.8 MMOL/L (ref 3.6–5.1)
RBC # BLD AUTO: 2.64 X10(6)UL (ref 3.8–5.8)
RED CELL DISTRIBUTION WIDTH-SD: 53.2 FL (ref 35.1–46.3)
SODIUM SERPL-SCNC: 142 MMOL/L (ref 136–144)
WBC # BLD AUTO: 7.8 X10(3) UL (ref 4–13)

## 2018-07-04 PROCEDURE — 99232 SBSQ HOSP IP/OBS MODERATE 35: CPT | Performed by: INTERNAL MEDICINE

## 2018-07-04 NOTE — PROGRESS NOTES
[facility]  Progress Note     5 [facility]  Patient Status:  Inpatient    10/3/1927 MRN [id number]   [facility] 2NE-A Attending Jesus Preston MD   Hosp Day # 21 PCP Karolyn Aceves MD [address] [place] [address]     Chief Complaint: chest pain    S: aspirin  81 mg Oral Daily   • Alfuzosin HCl ER  10 mg Oral Daily with breakfast   • bicalutamide  50 mg Oral Daily   • finasteride  5 mg Oral Daily   • mirtazapine  15 mg Oral Nightly   • atorvastatin  20 mg Oral Nightly   • famoTIDine  20 mg Oral QAM AC

## 2018-07-04 NOTE — PROGRESS NOTES
[facility]  Nephrology Progress Note    5 [facility]  Attending:  Michel Izaguirre MD       Assessment and Plan:    1) ETHAN- due to obstructive uropathy + cardiorenal syndrome; diuresed well over last several days- SOB / Larissa Marinas resolved; will hold torsemide Labs:     Lab Results  Component Value Date   WBC 7.8 07/04/2018   HGB 7.8 07/04/2018   HCT 25.9 07/04/2018   .0 07/04/2018   CREATSERUM 3.10 07/04/2018   BUN 44 07/04/2018    07/04/2018   K 4.8 07/04/2018    07/04/2018   CO2 28.0 07

## 2018-07-04 NOTE — PROGRESS NOTES
[facility]  Urology Progress Note    Gema Fulton Patient Status:  Inpatient    10/3/1927 MRN [id number]   [facility] 2NE-A Attending Yg Foy MD   Hosp Day # 21 PCP Anell Prader MD [place]     Assessment:  Left ureteral calcul

## 2018-07-04 NOTE — PROGRESS NOTES
[facility]  Progress Note    5 [facility]  Patient Status:  Inpatient    10/3/1927 MRN [id number]   [facility] 2NE-A Attending Theodora Manzo MD   Hosp Day # 21 PCP Frazier Bougie MD [place]       Assessment:    · CAD s/p PCI LAD  · CMP NAD  Heent/neck: no jvd  Cardiac: regular rate and rhythm, normal S1,S2; 2/6 MAY  Lungs: CTA  Abd: soft, NT/ND +bs  Ext: no edema      Labs:    Lab Results  Component Value Date   WBC 7.8 07/04/2018   HGB 7.8 07/04/2018   HCT 25.9 07/04/2018   .0 07

## 2018-07-05 ENCOUNTER — APPOINTMENT (OUTPATIENT)
Dept: ULTRASOUND IMAGING | Facility: HOSPITAL | Age: 83
DRG: 246 | End: 2018-07-05
Attending: INTERNAL MEDICINE
Payer: MEDICARE

## 2018-07-05 LAB
BASOPHILS # BLD AUTO: 0.04 X10(3) UL (ref 0–0.1)
BASOPHILS NFR BLD AUTO: 0.6 %
BUN BLD-MCNC: 45 MG/DL (ref 8–20)
CALCIUM BLD-MCNC: 7.4 MG/DL (ref 8.3–10.3)
CHLORIDE: 107 MMOL/L (ref 101–111)
CO2: 28 MMOL/L (ref 22–32)
CREAT BLD-MCNC: 3.07 MG/DL (ref 0.7–1.3)
EOSINOPHIL # BLD AUTO: 0.25 X10(3) UL (ref 0–0.3)
EOSINOPHIL NFR BLD AUTO: 3.6 %
ERYTHROCYTE [DISTWIDTH] IN BLOOD BY AUTOMATED COUNT: 15.5 % (ref 11.5–16)
GLUCOSE BLD-MCNC: 110 MG/DL (ref 70–99)
HCT VFR BLD AUTO: 26.5 % (ref 37–53)
HGB BLD-MCNC: 7.9 G/DL (ref 13–17)
IMMATURE GRANULOCYTE COUNT: 0.03 X10(3) UL (ref 0–1)
IMMATURE GRANULOCYTE RATIO %: 0.4 %
LYMPHOCYTES # BLD AUTO: 1.28 X10(3) UL (ref 0.9–4)
LYMPHOCYTES NFR BLD AUTO: 18.5 %
MCH RBC QN AUTO: 29.7 PG (ref 27–33.2)
MCHC RBC AUTO-ENTMCNC: 29.8 G/DL (ref 31–37)
MCV RBC AUTO: 99.6 FL (ref 80–99)
MONOCYTES # BLD AUTO: 0.46 X10(3) UL (ref 0.1–1)
MONOCYTES NFR BLD AUTO: 6.6 %
NEUTROPHIL ABS PRELIM: 4.86 X10 (3) UL (ref 1.3–6.7)
NEUTROPHILS # BLD AUTO: 4.86 X10(3) UL (ref 1.3–6.7)
NEUTROPHILS NFR BLD AUTO: 70.3 %
PLATELET # BLD AUTO: 170 10(3)UL (ref 150–450)
POTASSIUM SERPL-SCNC: 4.8 MMOL/L (ref 3.6–5.1)
RBC # BLD AUTO: 2.66 X10(6)UL (ref 3.8–5.8)
RED CELL DISTRIBUTION WIDTH-SD: 55.4 FL (ref 35.1–46.3)
SODIUM SERPL-SCNC: 142 MMOL/L (ref 136–144)
WBC # BLD AUTO: 6.9 X10(3) UL (ref 4–13)

## 2018-07-05 PROCEDURE — 99232 SBSQ HOSP IP/OBS MODERATE 35: CPT | Performed by: INTERNAL MEDICINE

## 2018-07-05 PROCEDURE — 99233 SBSQ HOSP IP/OBS HIGH 50: CPT | Performed by: INTERNAL MEDICINE

## 2018-07-05 PROCEDURE — 93880 EXTRACRANIAL BILAT STUDY: CPT | Performed by: INTERNAL MEDICINE

## 2018-07-05 RX ORDER — HEPARIN SODIUM 5000 [USP'U]/ML
5000 INJECTION, SOLUTION INTRAVENOUS; SUBCUTANEOUS EVERY 8 HOURS SCHEDULED
Status: DISCONTINUED | OUTPATIENT
Start: 2018-07-05 | End: 2018-07-07

## 2018-07-05 RX ORDER — TORSEMIDE 20 MG/1
40 TABLET ORAL DAILY
Status: DISCONTINUED | OUTPATIENT
Start: 2018-07-05 | End: 2018-07-07

## 2018-07-05 NOTE — PHYSICAL THERAPY NOTE
PHYSICAL THERAPY TREATMENT NOTE - INPATIENT    Room Number: 8912/9451-I     Session: 4   Number of Visits to Meet Established Goals: 5    Presenting Problem: Chest pain    History related to current admission: Pt is 80year old male directly admitted on 6 Syncope    • Uncomfortable fullness after meals    • Visual impairment     macular degeneration       Past Surgical History  Past Surgical History:  No date: ANGIOGRAM  No date: ANGIOPLASTY (CORONARY)  No date: CATH PERCUTANEOUS  TRANSLUMINAL CORONARY SHAHRAM Assistance: Not tested  Comment : Score based on FIM definitions per department protocol. Skilled Therapy Provided: Session approved by RN. Pt received sitting in bedside chair, agreeable to therapy.  Sit to stand completed with CGA for safety; verbal cu education; Family education; Neuromuscular re-educate;Gait training;Stair training;Transfer training;Balance training  Rehab Potential : Good  Frequency (Obs): 5x/week    CURRENT GOALS   Goal #1 Patient is able to demonstrate supine - sit EOB @ level: modifi

## 2018-07-05 NOTE — SLP NOTE
ADULT SWALLOWING EVALUATION    ASSESSMENT    ASSESSMENT/OVERALL IMPRESSION:  Order received for bedside swallow evaluation.  Per RN, patient observed coughing while taking whole pills with water a couple of nights ago; no difficulties observed since changed at bedside and continue to follow. Busby Assessment of Swallow Function Score: No abnormality detected    RECOMMENDATIONS   Diet Recommendations - Solids: Regular  Diet Recommendations - Liquid:  Thin  Take pills whole in applesauce  Aspiration precauti Uncomfortable fullness after meals    • Visual impairment     macular degeneration       Prior Living Situation: Home with support  Diet Prior to Admission: Regular; Thin liquids  Precautions: Aspiration    Patient/Family Goals: none stated    SWALLOWING HI (clinical evaluation) including Slow rate, Small bites, Small sips, Alternate liquids/solids, Upright 90 degrees 30 mins after meal, Supervision with meals with supervision assistance 95 % of the time across 2 sessions.   New goal

## 2018-07-05 NOTE — PROGRESS NOTES
[facility]  Nephrology Progress Note    5 [facility]  Attending:  Darrin Salcedo MD       Assessment and Plan:    1) ETHAN- due to obstructive uropathy + cardiorenal syndrome; resume torsemide 40 mg qd today; accept Cr approx 3 with diuretics to [facility] extremities  Skin: Warm and dry, no rashes       Labs:     Lab Results  Component Value Date   WBC 6.9 07/05/2018   HGB 7.9 07/05/2018   HCT 26.5 07/05/2018   .0 07/05/2018   CREATSERUM 3.07 07/05/2018   BUN 45 07/05/2018    07/05/2018   K 4.8

## 2018-07-05 NOTE — PROGRESS NOTES
[facility]  Progress Note    5 [facility]  Patient Status:  Inpatient    10/3/1927 MRN [id number]   [facility] 2NE-A Attending Guido Peña MD   Hosp Day # 25 PCP Hulda Force MD [facility]     ASSESSMENT     · Dyspnea due to fluid nontender, no guarding, no rebound, positive BS  Extremity: no lower extremity edema, no cyanosis  Neurological: no new focal deficits    Lab Data Review:  Recent Labs   Lab  07/03/18   0550  07/04/18   0518  07/05/18   0518   RBC  2.64*  2.64*  2.66*   HG breakfast   magnesium hydroxide (MILK OF MAGNESIA) 400 MG/5ML suspension 30 mL 30 mL Oral Daily PRN   bicalutamide (CASODEX) tab 50 mg 50 mg Oral Daily   finasteride (PROSCAR) tab 5 mg 5 mg Oral Daily   mirtazapine (REMERON) tab 15 mg 15 mg Oral Nightly

## 2018-07-05 NOTE — PROGRESS NOTES
[facility]  Progress Note     5 [facility]  Patient Status:  Inpatient    10/3/1927 MRN [id number]   [facility] 2NE-A Attending Suzette Clinton MD   Hosp Day # 25 PCP Angela Perez MD [address] [place] [address]     Chief Complaint: chest pain    S: Alfuzosin HCl ER  10 mg Oral Daily with breakfast   • bicalutamide  50 mg Oral Daily   • finasteride  5 mg Oral Daily   • mirtazapine  15 mg Oral Nightly   • atorvastatin  20 mg Oral Nightly   • famoTIDine  20 mg Oral QAM AC       ASSESSMENT / PLAN:

## 2018-07-05 NOTE — PROGRESS NOTES
[facility]  Progress Note    5 [facility]  Patient Status:  Inpatient    10/3/1927 MRN [id number]   [facility] 2NE-A Attending Dominic Harris MD   Hosp Day # 25 PCP Margot Dieter SR MD [place]       Subjective:  No new complaints.   Kim Whipple +bs. + nephrostomy tube  Ext: no edema  Skin: Warm, dry  Neuro: No focal deficits      Labs:    Lab Results  Component Value Date   WBC 6.9 07/05/2018   HGB 7.9 07/05/2018   HCT 26.5 07/05/2018   .0 07/05/2018   CREATSERUM 3.07 07/05/2018   BUN 45 0 testing which needs to be sorted out by the structural heart team). Spoke to Dr Samy Ramon, plan will be for further op follow up with that team. Will need to be carefully coordinated with urology and nephrology. PT is recommending home with home health.   Plan

## 2018-07-06 LAB
ATRIAL RATE: 89 BPM
BASOPHILS # BLD AUTO: 0.06 X10(3) UL (ref 0–0.1)
BASOPHILS NFR BLD AUTO: 0.9 %
BUN BLD-MCNC: 45 MG/DL (ref 8–20)
CALCIUM BLD-MCNC: 7.5 MG/DL (ref 8.3–10.3)
CHLORIDE: 108 MMOL/L (ref 101–111)
CO2: 28 MMOL/L (ref 22–32)
CREAT BLD-MCNC: 2.98 MG/DL (ref 0.7–1.3)
EOSINOPHIL # BLD AUTO: 0.29 X10(3) UL (ref 0–0.3)
EOSINOPHIL NFR BLD AUTO: 4.5 %
ERYTHROCYTE [DISTWIDTH] IN BLOOD BY AUTOMATED COUNT: 15.6 % (ref 11.5–16)
GLUCOSE BLD-MCNC: 109 MG/DL (ref 70–99)
HAV IGM SER QL: 2.2 MG/DL (ref 1.8–2.5)
HCT VFR BLD AUTO: 27.4 % (ref 37–53)
HGB BLD-MCNC: 8.2 G/DL (ref 13–17)
IMMATURE GRANULOCYTE COUNT: 0.02 X10(3) UL (ref 0–1)
IMMATURE GRANULOCYTE RATIO %: 0.3 %
LYMPHOCYTES # BLD AUTO: 1.26 X10(3) UL (ref 0.9–4)
LYMPHOCYTES NFR BLD AUTO: 19.7 %
MCH RBC QN AUTO: 29.3 PG (ref 27–33.2)
MCHC RBC AUTO-ENTMCNC: 29.9 G/DL (ref 31–37)
MCV RBC AUTO: 97.9 FL (ref 80–99)
MONOCYTES # BLD AUTO: 0.44 X10(3) UL (ref 0.1–1)
MONOCYTES NFR BLD AUTO: 6.9 %
NEUTROPHIL ABS PRELIM: 4.34 X10 (3) UL (ref 1.3–6.7)
NEUTROPHILS # BLD AUTO: 4.34 X10(3) UL (ref 1.3–6.7)
NEUTROPHILS NFR BLD AUTO: 67.7 %
P AXIS: 66 DEGREES
P-R INTERVAL: 184 MS
PLATELET # BLD AUTO: 165 10(3)UL (ref 150–450)
POTASSIUM SERPL-SCNC: 4.7 MMOL/L (ref 3.6–5.1)
Q-T INTERVAL: 446 MS
QRS DURATION: 92 MS
QTC CALCULATION (BEZET): 542 MS
R AXIS: 33 DEGREES
RBC # BLD AUTO: 2.8 X10(6)UL (ref 3.8–5.8)
RED CELL DISTRIBUTION WIDTH-SD: 54.3 FL (ref 35.1–46.3)
SODIUM SERPL-SCNC: 142 MMOL/L (ref 136–144)
T AXIS: 99 DEGREES
VENTRICULAR RATE: 89 BPM
WBC # BLD AUTO: 6.4 X10(3) UL (ref 4–13)

## 2018-07-06 PROCEDURE — 99232 SBSQ HOSP IP/OBS MODERATE 35: CPT | Performed by: INTERNAL MEDICINE

## 2018-07-06 RX ORDER — IPRATROPIUM BROMIDE AND ALBUTEROL SULFATE 2.5; .5 MG/3ML; MG/3ML
3 SOLUTION RESPIRATORY (INHALATION) EVERY 4 HOURS PRN
Status: DISCONTINUED | OUTPATIENT
Start: 2018-07-06 | End: 2018-07-07

## 2018-07-06 RX ORDER — TORSEMIDE 20 MG/1
40 TABLET ORAL DAILY
Qty: 60 TABLET | Refills: 11 | Status: ON HOLD | OUTPATIENT
Start: 2018-07-06 | End: 2018-07-24

## 2018-07-06 NOTE — PLAN OF CARE
Pt. Is alert and oriented times four. Lungs clear, diminished on auscultation. Pt. Has no c/o pain. He wants to go home today. Nephrostomy bag draining clear yellow urine. He is having frequent runs of v tach: 5 to 7 beats.  Cardiology notified during the n

## 2018-07-06 NOTE — SLP NOTE
SPEECH DAILY NOTE - INPATIENT    ASSESSMENT & PLAN   ASSESSMENT  Pt seen for dysphagia tx to assess tolerance with recommended diet, ensure appropriate utilization of aspiration precautions and provide pt/family education.  Pt found lying down in bed; alert after meal, Supervision with meals with supervision assistance 95 % of the time across 2 sessions.   Goal met           FOLLOW UP  Follow Up Needed: No  SLP Follow-up Date: 07/06/18  Number of Visits to Meet Established Goals: 1    Session: 1    If you have

## 2018-07-06 NOTE — PHYSICAL THERAPY NOTE
PHYSICAL THERAPY TREATMENT NOTE - INPATIENT    Room Number: 5881/7773-L     Session: 5   Number of Visits to Meet Established Goals: 5    Presenting Problem: Chest pain    History related to current admission: Pt is 80year old male directly admitted on 6 Syncope    • Uncomfortable fullness after meals    • Visual impairment     macular degeneration       Past Surgical History  Past Surgical History:  No date: ANGIOGRAM  No date: ANGIOPLASTY (CORONARY)  No date: CATH PERCUTANEOUS  TRANSLUMINAL CORONARY SHAHRAM tested  Comment : Score based on FIM definitions per department protocol. Skilled Therapy Provided: Session approved by RN. Pt received in supine, performed supine to sit from R side to simulate home environment.  Pt able to perform task with supervision education; Family education; Neuromuscular re-educate;Gait training;Stair training;Transfer training;Balance training  Rehab Potential : Good  Frequency (Obs): 5x/week    CURRENT GOALS   Goal #1 Patient is able to demonstrate supine - sit EOB @ level: modifi

## 2018-07-06 NOTE — PROGRESS NOTES
[facility]  Progress Note     5 [facility]  Patient Status:  Inpatient    10/3/1927 MRN [id number]   [facility] 2NE-A Attending Quintin Hoyt MD   Hosp Day # 21 PCP Olivia An MD [address] [place]     Chief Complaint: chest pain    S: ipratropium-albuterol  3 mL Nebulization Q6H WA   • Clopidogrel Bisulfate  75 mg Oral Daily   • epoetin manolo  10,000 Units Subcutaneous Q7 Days   • aspirin  81 mg Oral Daily   • Alfuzosin HCl ER  10 mg Oral Daily with breakfast   • bicalutamide  50 mg Oral

## 2018-07-06 NOTE — PLAN OF CARE
Patient having runs of VT - 18 beats, 5 beats. Asymptomatic. BP 97/61. Dr Wally Jean Baptiste notified. Orders received to increase metoprolol and okay to give if SBP>90. Will check electrolytes in the morning. Patient given metoprolol dose. Endorsed to oncoming RN.

## 2018-07-06 NOTE — PROGRESS NOTES
[facility]  Progress Note    5 [facility]  Patient Status:  Inpatient    10/3/1927 MRN [id number]   [facility] 2NE-A Attending Stan Veronica MD   Hosp Day # 23 PCP Donell Beer MD [facility]     ASSESSMENT     · Dyspnea due to fluid respiratory distress  Abdomen: soft, nontender, no guarding, no rebound, positive BS  Extremity: no lower extremity edema, no cyanosis  Neurological: no new focal deficits    Lab Data Review:  Recent Labs   Lab  07/04/18   0518  07/05/18   0518  07/06/18 81 mg 81 mg Oral Daily   acetaminophen (TYLENOL EXTRA STRENGTH) tab 500 mg 500 mg Oral Q6H PRN   Alfuzosin HCl ER (UROXATRAL) 24 hr tab 10 mg 10 mg Oral Daily with breakfast   magnesium hydroxide (MILK OF MAGNESIA) 400 MG/5ML suspension 30 mL 30 mL Oral Da

## 2018-07-06 NOTE — DIETARY NOTE
NUTRITION FOLLOW UP ASSESSMENT    Pt is at moderate nutrition risk. Pt does not meet malnutrition criteria.     NUTRITION DIAGNOSIS/PROBLEM:    Inadequate energy intake related to physiological causes as evidenced by reported poor intake x 1 mo & 5% wt loss able    6/25- Pt for Angio today. Previously checked in with family, and pt is tolerating Ensure Clear BID and eating as best as possible. No GI distress. Wt up since admission. 89/M admitted with Chest Pain. PMH includes PVD, CAD, Prostate CA, CKD. of skin breakdown  4.  Maintain lean body mass    MEDICATIONS:  Noted    LABS:  Noted    FOLLOW-UP DATE: 7/11/18  Helio Wilson RD,LDN  Pager #[phone]

## 2018-07-06 NOTE — PLAN OF CARE
Patient a&ox4. SR on tele. Lungs diminished. Denies any pain. Nephrostomy and wall intact. Up with assist and walker. Daily weight. Labs in am. Anticipate d/c home in am. Patient resting in room, will continue to monitor.

## 2018-07-06 NOTE — CM/SW NOTE
Patient reviewed in rounds, per primary rn, dtr has concerns for patient going home with nephrostomy tubes and wall. PT recommending home with home health.  Patient and dtr would like referral sent to [facility] to see if they can accept for a short term gavin

## 2018-07-06 NOTE — PLAN OF CARE
Fatima catheter discontinued at urology instruction: patient will practice self catheterization to prepare for discharge.

## 2018-07-06 NOTE — PROGRESS NOTES
[facility]  Nephrology Progress Note    5 [facility]  Attending:  Luis Harrell MD       Assessment and Plan:    1) ETHAN- due to obstructive uropathy + cardiorenal syndrome; resume torsemide 40 mg qd today; accept Cr approx 3 with diuretics to [facility] grossly intact, moving all extremities  Skin: Warm and dry, no rashes       Labs:     Lab Results  Component Value Date   WBC 6.4 07/06/2018   HGB 8.2 07/06/2018   HCT 27.4 07/06/2018   .0 07/06/2018   CREATSERUM 2.98 07/06/2018   BUN 45 07/06/2018

## 2018-07-06 NOTE — PROGRESS NOTES
· [facility] Cardiology Progress Note     Subjective:  Frustrated with delay in discharge. Not aware on NSVT.   Intermittent dyspnea    Objective:  100/55 afebrile  SR with NSVT  I/O - 770   BUN/cr 45/2.98  Hgb 8.2     Cardiac: S1 S2 regular with MAY  Mary

## 2018-07-07 VITALS
WEIGHT: 173.5 LBS | OXYGEN SATURATION: 94 % | HEART RATE: 81 BPM | TEMPERATURE: 98 F | SYSTOLIC BLOOD PRESSURE: 89 MMHG | BODY MASS INDEX: 25 KG/M2 | RESPIRATION RATE: 18 BRPM | DIASTOLIC BLOOD PRESSURE: 54 MMHG

## 2018-07-07 NOTE — PLAN OF CARE
Pt. Is alert and oriented times four. Lungs clear, diminished on auscultation. Pt. Has no c/o pain. He is sinus rhythm on monitor: PVC's have become less frequent, will watch for runs of v tach per cardiology (prior to sending to rehab).  He remains on isol

## 2018-07-07 NOTE — CM/SW NOTE
Final dc orders received. luis e confirmed bed at [facility]. luis e arranged [facility] for 430pm. PCS form completed.  dtr at bedside    RN to call report to [phone]

## 2018-07-07 NOTE — PROGRESS NOTES
[facility] Cardiology Progress Note    Subjective:  Had a good night. Still with some sob with activity yet much better than admission. Able to self cath without issue.      Objective:  /62 (BP Location: Left arm)   Pulse 90   Temp 97.5 °F (36.4 °C) (Ora

## 2018-07-08 NOTE — PLAN OF CARE
NURSING DISCHARGE NOTE    Discharged Rehab facility via Ambulance. Accompanied by Support staff  Belongings Taken by patient/family.  Went over all discharge orders with daughter: explained all medication changes and talked about follow up appointments

## 2018-07-11 ENCOUNTER — PRIOR ORIGINAL RECORDS (OUTPATIENT)
Dept: OTHER | Age: 83
End: 2018-07-11

## 2018-07-12 ENCOUNTER — PRIOR ORIGINAL RECORDS (OUTPATIENT)
Dept: OTHER | Age: 83
End: 2018-07-12

## 2018-07-13 NOTE — DISCHARGE SUMMARY
[facility]  Discharge Summary    5 [facility]  Patient Status:  Inpatient    10/3/1927 MRN [id number]   [facility] 2NE-A Attending No att. providers found   Hosp Day # 24 PCP Benjy Evans MD [address] [place] [address]     Date of Admission: 2018    PREOPERATIVE DIAGNOSIS:    POSTOPERATIVE DIAGNOSIS:    PROCEDURE PERFORMED:       INDICATIONS:  A 44-year-old male with unstable angina who had acute on chronic renal insufficiency secondary to obstructive uropathy and is post nephrostomy.   He has a h distal ureteral calculus. 3.       Metastatic prostate cancer. POSTOPERATIVE DIAGNOSIS:    1. Left hydronephrosis. 2.       Left distal ureteral calculus. 3.       Metastatic prostate cancer.   PROCEDURE PERFORMED:  Cystoscopy.     ANESTHESIA:  Ge left lateral lobe of the prostate into the left side of the trigone completely obliterating the normal anatomy of the left side of the trigone.   The right ureteral orifice was visible in its normal expected anatomic position on the right side of the trigon [id number]  CWJ/    Disposition: SNF      Discharge Medications: Discharge Medication List as of 7/7/2018  2:34 PM    START taking these medications    torsemide 20 MG Oral Tab  Take 2 tablets (40 mg total) by mouth daily. , Normal, Disp-60 table

## 2018-07-17 ENCOUNTER — SNF VISIT (OUTPATIENT)
Dept: INTERNAL MEDICINE CLINIC | Age: 83
End: 2018-07-17

## 2018-07-17 VITALS
RESPIRATION RATE: 16 BRPM | OXYGEN SATURATION: 97 % | WEIGHT: 168.81 LBS | HEART RATE: 97 BPM | SYSTOLIC BLOOD PRESSURE: 96 MMHG | DIASTOLIC BLOOD PRESSURE: 56 MMHG | BODY MASS INDEX: 24 KG/M2

## 2018-07-17 DIAGNOSIS — R68.81 EARLY SATIETY: ICD-10-CM

## 2018-07-17 DIAGNOSIS — J45.20 MILD INTERMITTENT ASTHMA WITHOUT COMPLICATION: ICD-10-CM

## 2018-07-17 DIAGNOSIS — I73.9 PVD (PERIPHERAL VASCULAR DISEASE) (HCC): ICD-10-CM

## 2018-07-17 DIAGNOSIS — N17.9 AKI (ACUTE KIDNEY INJURY) (HCC): ICD-10-CM

## 2018-07-17 DIAGNOSIS — R53.1 WEAKNESS: ICD-10-CM

## 2018-07-17 DIAGNOSIS — N18.30 STAGE 3 CHRONIC KIDNEY DISEASE (HCC): ICD-10-CM

## 2018-07-17 DIAGNOSIS — R05.9 COUGH: ICD-10-CM

## 2018-07-17 DIAGNOSIS — N20.1 OBSTRUCTION OF LEFT URETEROPELVIC JUNCTION (UPJ) DUE TO STONE: ICD-10-CM

## 2018-07-17 DIAGNOSIS — D50.9 IRON DEFICIENCY ANEMIA, UNSPECIFIED IRON DEFICIENCY ANEMIA TYPE: ICD-10-CM

## 2018-07-17 DIAGNOSIS — C61 PROSTATE CANCER (HCC): ICD-10-CM

## 2018-07-17 DIAGNOSIS — I25.10 CORONARY ARTERY DISEASE INVOLVING NATIVE CORONARY ARTERY, ANGINA PRESENCE UNSPECIFIED, UNSPECIFIED WHETHER NATIVE OR TRANSPLANTED HEART: Primary | ICD-10-CM

## 2018-07-17 DIAGNOSIS — I35.0 SEVERE AORTIC STENOSIS: ICD-10-CM

## 2018-07-17 DIAGNOSIS — I25.5 ISCHEMIC CARDIOMYOPATHY: ICD-10-CM

## 2018-07-17 PROCEDURE — 99310 SBSQ NF CARE HIGH MDM 45: CPT | Performed by: NURSE PRACTITIONER

## 2018-07-17 NOTE — PROGRESS NOTES
Wandacheng Fahad  : 10/3/1927  Age 80year old  male patient is admitted to Facility: [address] [facility] [address] for GONZALEZ after CAD s/p stent to LAD/obstructive uropathy s/p nephrostomy tube.     [facility] [place] [facility] Admit date:    18  Discharge date to GONZALEZ:    7.7 CORONARY ARTERY DISEASE     hx stents   • Diarrhea, unspecified    • Dizziness    • Easy bruising    • Extrinsic asthma, unspecified    • Eye disease    • Fatigue    • Frequent urination    • GENITO-URINARY DISEASE     prostate CA: straight caths   • Heari vitamin Oral Chew Tab Chew 1 tablet by mouth daily. Disp:  Rfl:    Clopidogrel Bisulfate (PLAVIX) 75 MG Oral Tab Take 75 mg by mouth daily. Disp:  Rfl:    mirtazapine (REMERON) 15 MG Oral Tab Take 15 mg by mouth nightly.  Disp:  Rfl:    Calcium Carbonate-Vi distress; resting in bed  LINES, TUBES, DRAINS:  left nephrostomy tube  SKIN: no rashes, no suspicious lesions, pale, warm, dry; +poor skin turgor  WOUND:    Left nephrostomy site:  CDI drsg  Right buttock, top of gluteal fold:  Sheer wound open to air, pi 3  1. Baseline Cr ~1.7  2. Consult Dr Lackey/nephrologyy  3. Minimize nephrotoxic meds  4. Hold Torsemide x 48 hrs  5. IVF 0.9 NS @ 75 cc/hr x 1 L  6. Stat BMP after IVF    Obstructive uropathy s/p left nephrostomy tube  1. Finasteride 5 mg daily  2.  Nephros

## 2018-07-18 ENCOUNTER — APPOINTMENT (OUTPATIENT)
Dept: GENERAL RADIOLOGY | Facility: HOSPITAL | Age: 83
DRG: 698 | End: 2018-07-18
Payer: MEDICARE

## 2018-07-18 ENCOUNTER — PRIOR ORIGINAL RECORDS (OUTPATIENT)
Dept: OTHER | Age: 83
End: 2018-07-18

## 2018-07-18 ENCOUNTER — APPOINTMENT (OUTPATIENT)
Dept: CT IMAGING | Facility: HOSPITAL | Age: 83
DRG: 698 | End: 2018-07-18
Attending: STUDENT IN AN ORGANIZED HEALTH CARE EDUCATION/TRAINING PROGRAM
Payer: MEDICARE

## 2018-07-18 ENCOUNTER — HOSPITAL ENCOUNTER (INPATIENT)
Facility: HOSPITAL | Age: 83
LOS: 5 days | Discharge: NURSING, REHAB OR LONG-TERM CARE | DRG: 698 | End: 2018-07-24
Attending: STUDENT IN AN ORGANIZED HEALTH CARE EDUCATION/TRAINING PROGRAM | Admitting: HOSPITALIST
Payer: MEDICARE

## 2018-07-18 DIAGNOSIS — R31.9 URINARY TRACT INFECTION WITH HEMATURIA, SITE UNSPECIFIED: Primary | ICD-10-CM

## 2018-07-18 DIAGNOSIS — A41.9 SEPSIS, DUE TO UNSPECIFIED ORGANISM: ICD-10-CM

## 2018-07-18 DIAGNOSIS — N20.1 URETEROLITHIASIS: ICD-10-CM

## 2018-07-18 DIAGNOSIS — N39.0 URINARY TRACT INFECTION WITH HEMATURIA, SITE UNSPECIFIED: Primary | ICD-10-CM

## 2018-07-18 LAB
ALBUMIN SERPL-MCNC: 3.2 G/DL (ref 3.5–4.8)
ALBUMIN/GLOB SERPL: 0.7 {RATIO} (ref 1–2)
ALP LIVER SERPL-CCNC: 69 U/L (ref 45–117)
ALT SERPL-CCNC: 19 U/L (ref 17–63)
ANION GAP SERPL CALC-SCNC: 9 MMOL/L (ref 0–18)
AST SERPL-CCNC: 14 U/L (ref 15–41)
BAND %: 16 %
BASOPHIL % MANUAL: 1 %
BASOPHIL ABSOLUTE MANUAL: 0.05 X10(3) UL (ref 0–0.1)
BILIRUB SERPL-MCNC: 0.5 MG/DL (ref 0.1–2)
BILIRUB UR QL STRIP.AUTO: NEGATIVE
BUN BLD-MCNC: 76 MG/DL (ref 8–20)
BUN/CREAT SERPL: 16.1 (ref 10–20)
CALCIUM BLD-MCNC: 9.4 MG/DL (ref 8.3–10.3)
CHLORIDE: 106 MMOL/L (ref 101–111)
CO2: 26 MMOL/L (ref 22–32)
COLOR UR AUTO: YELLOW
CREAT BLD-MCNC: 4.71 MG/DL (ref 0.7–1.3)
EOSINOPHIL % MANUAL: 1 %
EOSINOPHIL ABSOLUTE MANUAL: 0.05 X10(3) UL (ref 0–0.3)
ERYTHROCYTE [DISTWIDTH] IN BLOOD BY AUTOMATED COUNT: 15 % (ref 11.5–16)
GLOBULIN PLAS-MCNC: 4.4 G/DL (ref 2.5–3.7)
GLUCOSE BLD-MCNC: 139 MG/DL (ref 70–99)
GLUCOSE UR STRIP.AUTO-MCNC: NEGATIVE MG/DL
HCT VFR BLD AUTO: 32.7 % (ref 37–53)
HGB BLD-MCNC: 10.3 G/DL (ref 13–17)
KETONES UR STRIP.AUTO-MCNC: NEGATIVE MG/DL
LACTIC ACID: 1.5 MMOL/L (ref 0.5–2)
LYMPHOCYTE % MANUAL: 8 %
LYMPHOCYTE ABSOLUTE MANUAL: 0.4 X10(3) UL (ref 0.9–4)
M PROTEIN MFR SERPL ELPH: 7.6 G/DL (ref 6.1–8.3)
MCH RBC QN AUTO: 29.9 PG (ref 27–33.2)
MCHC RBC AUTO-ENTMCNC: 31.5 G/DL (ref 31–37)
MCV RBC AUTO: 94.8 FL (ref 80–99)
MONOCYTE % MANUAL: 7 %
MONOCYTE ABSOLUTE MANUAL: 0.35 X10(3) UL (ref 0.1–1)
MORPHOLOGY: NORMAL
NEUTROPHIL ABS PRELIM: 4.23 X10 (3) UL (ref 1.3–6.7)
NEUTROPHIL ABSOLUTE MANUAL: 4.15 X10(3) UL (ref 1.3–6.7)
NEUTROPHILS % MANUAL: 67 %
NITRITE UR QL STRIP.AUTO: NEGATIVE
OSMOLALITY SERPL CALC.SUM OF ELEC: 317 MOSM/KG (ref 275–295)
PH UR STRIP.AUTO: 5 [PH] (ref 4.5–8)
PLATELET # BLD AUTO: 203 10(3)UL (ref 150–450)
PLATELET MORPHOLOGY: NORMAL
POTASSIUM SERPL-SCNC: 4.2 MMOL/L (ref 3.6–5.1)
PROT UR STRIP.AUTO-MCNC: 100 MG/DL
RBC # BLD AUTO: 3.45 X10(6)UL (ref 3.8–5.8)
RBC #/AREA URNS AUTO: >10 /HPF
RED CELL DISTRIBUTION WIDTH-SD: 51.7 FL (ref 35.1–46.3)
SODIUM SERPL-SCNC: 141 MMOL/L (ref 136–144)
SP GR UR STRIP.AUTO: 1.01 (ref 1–1.03)
TOTAL CELLS COUNTED: 100
TROPONIN: <0.046 NG/ML (ref ?–0.05)
UROBILINOGEN UR STRIP.AUTO-MCNC: <2 MG/DL
WBC # BLD AUTO: 5 X10(3) UL (ref 4–13)
WBC #/AREA URNS AUTO: >50 /HPF
WBC CLUMPS UR QL AUTO: PRESENT

## 2018-07-18 PROCEDURE — 71045 X-RAY EXAM CHEST 1 VIEW: CPT | Performed by: STUDENT IN AN ORGANIZED HEALTH CARE EDUCATION/TRAINING PROGRAM

## 2018-07-18 PROCEDURE — 74176 CT ABD & PELVIS W/O CONTRAST: CPT | Performed by: STUDENT IN AN ORGANIZED HEALTH CARE EDUCATION/TRAINING PROGRAM

## 2018-07-18 PROCEDURE — 99223 1ST HOSP IP/OBS HIGH 75: CPT | Performed by: HOSPITALIST

## 2018-07-18 RX ORDER — POLYETHYLENE GLYCOL 3350 17 G/17G
17 POWDER, FOR SOLUTION ORAL DAILY
COMMUNITY
End: 2018-08-07 | Stop reason: ALTCHOICE

## 2018-07-18 RX ORDER — DOCUSATE SODIUM 100 MG/1
100 CAPSULE, LIQUID FILLED ORAL 2 TIMES DAILY PRN
Status: ON HOLD | COMMUNITY
End: 2020-01-01

## 2018-07-18 RX ORDER — IPRATROPIUM BROMIDE AND ALBUTEROL SULFATE 2.5; .5 MG/3ML; MG/3ML
3 SOLUTION RESPIRATORY (INHALATION) EVERY 6 HOURS PRN
COMMUNITY
End: 2018-08-07 | Stop reason: ALTCHOICE

## 2018-07-18 RX ORDER — ACETAMINOPHEN 325 MG/1
650 TABLET ORAL EVERY 4 HOURS
COMMUNITY
End: 2019-01-01

## 2018-07-19 PROBLEM — R31.9 URINARY TRACT INFECTION WITH HEMATURIA, SITE UNSPECIFIED: Status: ACTIVE | Noted: 2018-07-19

## 2018-07-19 PROBLEM — N39.0 URINARY TRACT INFECTION WITH HEMATURIA, SITE UNSPECIFIED: Status: ACTIVE | Noted: 2018-07-19

## 2018-07-19 PROBLEM — N20.1 URETEROLITHIASIS: Status: ACTIVE | Noted: 2018-07-19

## 2018-07-19 PROBLEM — A41.9 SEPSIS, DUE TO UNSPECIFIED ORGANISM: Status: ACTIVE | Noted: 2018-07-19

## 2018-07-19 LAB
ANION GAP SERPL CALC-SCNC: 11 MMOL/L (ref 0–18)
ATRIAL RATE: 108 BPM
BASOPHILS # BLD AUTO: 0.01 X10(3) UL (ref 0–0.1)
BASOPHILS NFR BLD AUTO: 0.1 %
BUN BLD-MCNC: 73 MG/DL (ref 8–20)
BUN/CREAT SERPL: 18.3 (ref 10–20)
CALCIUM BLD-MCNC: 8.2 MG/DL (ref 8.3–10.3)
CHLORIDE: 111 MMOL/L (ref 101–111)
CO2: 22 MMOL/L (ref 22–32)
CREAT BLD-MCNC: 4 MG/DL (ref 0.7–1.3)
EOSINOPHIL # BLD AUTO: 0.01 X10(3) UL (ref 0–0.3)
EOSINOPHIL NFR BLD AUTO: 0.1 %
ERYTHROCYTE [DISTWIDTH] IN BLOOD BY AUTOMATED COUNT: 15.1 % (ref 11.5–16)
GLUCOSE BLD-MCNC: 137 MG/DL (ref 70–99)
HCT VFR BLD AUTO: 28.7 % (ref 37–53)
HGB BLD-MCNC: 8.6 G/DL (ref 13–17)
IMMATURE GRANULOCYTE COUNT: 0.02 X10(3) UL (ref 0–1)
IMMATURE GRANULOCYTE RATIO %: 0.2 %
LYMPHOCYTES # BLD AUTO: 1.04 X10(3) UL (ref 0.9–4)
LYMPHOCYTES NFR BLD AUTO: 12.6 %
MCH RBC QN AUTO: 29.6 PG (ref 27–33.2)
MCHC RBC AUTO-ENTMCNC: 30 G/DL (ref 31–37)
MCV RBC AUTO: 98.6 FL (ref 80–99)
MONOCYTES # BLD AUTO: 0.62 X10(3) UL (ref 0.1–1)
MONOCYTES NFR BLD AUTO: 7.5 %
NEUTROPHIL ABS PRELIM: 6.57 X10 (3) UL (ref 1.3–6.7)
NEUTROPHILS # BLD AUTO: 6.57 X10(3) UL (ref 1.3–6.7)
NEUTROPHILS NFR BLD AUTO: 79.5 %
OSMOLALITY SERPL CALC.SUM OF ELEC: 322 MOSM/KG (ref 275–295)
P AXIS: 70 DEGREES
P-R INTERVAL: 162 MS
PLATELET # BLD AUTO: 161 10(3)UL (ref 150–450)
POTASSIUM SERPL-SCNC: 4 MMOL/L (ref 3.6–5.1)
Q-T INTERVAL: 346 MS
QRS DURATION: 82 MS
QTC CALCULATION (BEZET): 463 MS
R AXIS: 40 DEGREES
RBC # BLD AUTO: 2.91 X10(6)UL (ref 3.8–5.8)
RED CELL DISTRIBUTION WIDTH-SD: 54.4 FL (ref 35.1–46.3)
SODIUM SERPL-SCNC: 144 MMOL/L (ref 136–144)
T AXIS: 99 DEGREES
VENTRICULAR RATE: 108 BPM
WBC # BLD AUTO: 8.3 X10(3) UL (ref 4–13)

## 2018-07-19 PROCEDURE — 99223 1ST HOSP IP/OBS HIGH 75: CPT | Performed by: INTERNAL MEDICINE

## 2018-07-19 PROCEDURE — 99232 SBSQ HOSP IP/OBS MODERATE 35: CPT | Performed by: INTERNAL MEDICINE

## 2018-07-19 RX ORDER — FAMOTIDINE 20 MG/1
20 TABLET ORAL DAILY
Status: DISCONTINUED | OUTPATIENT
Start: 2018-07-19 | End: 2018-07-24

## 2018-07-19 RX ORDER — BICALUTAMIDE 50 MG/1
50 TABLET, FILM COATED ORAL DAILY
Status: DISCONTINUED | OUTPATIENT
Start: 2018-07-19 | End: 2018-07-24

## 2018-07-19 RX ORDER — SODIUM CHLORIDE 9 MG/ML
INJECTION, SOLUTION INTRAVENOUS CONTINUOUS
Status: DISCONTINUED | OUTPATIENT
Start: 2018-07-19 | End: 2018-07-19

## 2018-07-19 RX ORDER — DOCUSATE SODIUM 100 MG/1
100 CAPSULE, LIQUID FILLED ORAL 2 TIMES DAILY PRN
Status: DISCONTINUED | OUTPATIENT
Start: 2018-07-19 | End: 2018-07-24

## 2018-07-19 RX ORDER — SODIUM CHLORIDE 9 MG/ML
INJECTION, SOLUTION INTRAVENOUS CONTINUOUS
Status: DISCONTINUED | OUTPATIENT
Start: 2018-07-19 | End: 2018-07-20

## 2018-07-19 RX ORDER — ONDANSETRON 2 MG/ML
4 INJECTION INTRAMUSCULAR; INTRAVENOUS EVERY 6 HOURS PRN
Status: DISCONTINUED | OUTPATIENT
Start: 2018-07-19 | End: 2018-07-24

## 2018-07-19 RX ORDER — FINASTERIDE 5 MG/1
5 TABLET, FILM COATED ORAL DAILY
Status: DISCONTINUED | OUTPATIENT
Start: 2018-07-19 | End: 2018-07-24

## 2018-07-19 RX ORDER — CLOPIDOGREL BISULFATE 75 MG/1
75 TABLET ORAL DAILY
Status: DISCONTINUED | OUTPATIENT
Start: 2018-07-19 | End: 2018-07-24

## 2018-07-19 RX ORDER — ATORVASTATIN CALCIUM 20 MG/1
20 TABLET, FILM COATED ORAL NIGHTLY
Status: DISCONTINUED | OUTPATIENT
Start: 2018-07-19 | End: 2018-07-24

## 2018-07-19 RX ORDER — BISACODYL 10 MG
10 SUPPOSITORY, RECTAL RECTAL
Status: DISCONTINUED | OUTPATIENT
Start: 2018-07-19 | End: 2018-07-24

## 2018-07-19 RX ORDER — MIRTAZAPINE 15 MG/1
15 TABLET, FILM COATED ORAL NIGHTLY
Status: DISCONTINUED | OUTPATIENT
Start: 2018-07-19 | End: 2018-07-24

## 2018-07-19 RX ORDER — POLYETHYLENE GLYCOL 3350 17 G/17G
17 POWDER, FOR SOLUTION ORAL DAILY PRN
Status: DISCONTINUED | OUTPATIENT
Start: 2018-07-19 | End: 2018-07-24

## 2018-07-19 RX ORDER — HEPARIN SODIUM 5000 [USP'U]/ML
5000 INJECTION, SOLUTION INTRAVENOUS; SUBCUTANEOUS EVERY 12 HOURS SCHEDULED
Status: DISCONTINUED | OUTPATIENT
Start: 2018-07-19 | End: 2018-07-24

## 2018-07-19 RX ORDER — ASPIRIN 81 MG/1
81 TABLET, CHEWABLE ORAL DAILY
Status: DISCONTINUED | OUTPATIENT
Start: 2018-07-19 | End: 2018-07-24

## 2018-07-19 RX ORDER — ACETAMINOPHEN 325 MG/1
650 TABLET ORAL EVERY 6 HOURS PRN
Status: DISCONTINUED | OUTPATIENT
Start: 2018-07-19 | End: 2018-07-20

## 2018-07-19 NOTE — ED NOTES
Report given to [facility], Halie 55 7 RN x [phone] at [address] [place] [address]. Transport paged. Pt and family updated on plan of care.

## 2018-07-19 NOTE — CONSULTS
[facility]  Report of Consultation    [address] Patient Status:  Inpatient    10/3/1927 MRN [id number]   [facility] 7NE-A Attending Ollie Sumner MD   Hosp Day # 0 PCP Darcie Niece MD [facility]       Assessment / Plan:    1) ETHAN- d Fatigue    • Frequent urination    • GENITO-URINARY DISEASE     prostate CA: straight caths   • Hearing impairment    • Hearing loss    • Hypotension    • Indigestion    • Neuropathy    • Osteoarthritis    • Osteoporosis    • Pain in joints    • PERIPHERAL bisacodyl (DULCOLAX) rectal suppository 10 mg, 10 mg, Rectal, Daily PRN  •  docusate sodium (COLACE) cap 100 mg, 100 mg, Oral, BID PRN  •  [START ON 7/20/2018] Vancomycin HCl (VANCOCIN) 1,250 mg in sodium chloride 0.9 % 500 mL IVPB, 15 mg/kg, Intravenous, reviewed. Thank you for allowing me to participate in the care of your patient.     Shadia Lackey  7/19/2018  8:32 AM

## 2018-07-19 NOTE — CONSULTS
[facility] Urology   Consultation Note    [address] Patient Status:  Inpatient    10/3/1927 MRN [id number]   [facility] 7NE-A Attending Nadiya Champion MD   Hosp Day # 0 PCP Aron Meckel MD [facility]     Reason for Consultation: • Extrinsic asthma, unspecified    • Eye disease    • Fatigue    • Frequent urination    • GENITO-URINARY DISEASE     prostate CA: straight caths   • Hearing impairment    • Hearing loss    • Hypotension    • Indigestion    • Neuropathy    • Osteoarthrit (MIRALAX) powder packet 17 g, 17 g, Oral, Daily PRN  •  bisacodyl (DULCOLAX) rectal suppository 10 mg, 10 mg, Rectal, Daily PRN  •  docusate sodium (COLACE) cap 100 mg, 100 mg, Oral, BID PRN  •  [START ON 7/20/2018] Vancomycin HCl (VANCOCIN) 1,250 mg in so calculi within the right renal pelvis measuring 2.6 cm in 2.0 cm respectively. There is a nephrostomy catheter on the left. There is left hydro pelvis. ADRENALS:  No mass or enlargement. AORTA/VASCULAR:  No aneurysm.     RETROPERITONEUM:  No mass or PVD (peripheral vascular disease) (HCC)     CAD (coronary artery disease)     Anemia     Thrombocytopenia (HCC)     Metabolic acidosis     CKD (chronic kidney disease) stage 3, GFR 30-59 ml/min (HCC)     Hypernatremia     S/P femoral-popliteal bypass surg cancer. Nephrostomy tube in place. Will continue to monitor. If patient does not continue to improve, will have IR exchange left nephrostomy tube. Check final urine culture, blood culture results. In the interim, continue with abx.   Patient wishes

## 2018-07-19 NOTE — SEPSIS REASSESSMENT
[facility]    Sepsis Reassessment Note    /57   Pulse 101   Temp (!) 101.2 °F (38.4 °C) (Oral)   Resp 26   Wt 77 kg   SpO2 98%   BMI 24.36 kg/m²      11:10 PM    Cardiac:  Regularity: Regular  Rate: Normal  Heart Sounds: S1,S2    Lungs:   Righ

## 2018-07-19 NOTE — PLAN OF CARE
NURSING ADMISSION NOTE    Pt admitted from ED, dx: UTI. Patient admitted via [address] to room. Safety precautions initiated. Bed in low position. Call light in reach. Pt a/ox3, forgetful. Drowsy. VSS, ST per tele, RA. Denies pain.   On IV

## 2018-07-19 NOTE — H&P
[facility]ITAL[facility]  History and Physical     Leonel Fulton Patient Status:  Emergency    10/3/1927 MRN [id number]   Location [address] Attending Vasile Meyer MD   Hosp Day # 0 PCP Arie Cruz MD [facility]     Chief Compl drugs. Family History:   Family History   Problem Relation Age of Onset   • Cancer Mother        Allergies:   Duricef [Cefadroxil]    RASH    Medications:    No current facility-administered medications on file prior to encounter.    Current Outpatient P Normocephalic atraumatic. Moist mucous membranes. EOM-I. PERRLA. Anicteric. Neck:  No JVD. No carotid bruits. Respiratory: Clear to auscultation bilaterally. No wheezes. No rhonchi. Cardiovascular: S1, S2. Tachy.  MAY  Chest and Back: No tenderness or de [facility], DO  7/18/2018

## 2018-07-19 NOTE — PLAN OF CARE
Alert and oriented x 3. Unaware of time. LBM 7/18 and was loose. Sat up in chair. Up with 1 assist and walker. Ate well for lunch, but needs encouragement. Poorer appetite. Preordered dinner for later this evening since had a later lunch.  Night shift to mo

## 2018-07-19 NOTE — PROGRESS NOTES
[facility] Pharmacy Note:  Renal Adjustment for piperacillin/tazobactam (Madeleine March)    Marni Linda is a 80year old male who has been prescribed piperacillin/tazobactam (ZOSYN) 3.375 gm every 8 hrs.   CrCl is estimated creatinine clearance is 10.8 mL/min (A) (based

## 2018-07-19 NOTE — ED PROVIDER NOTES
Patient Seen in: [facility] Emergency Department    History   Patient presents with:  Fever (infectious)    Stated Complaint:     HPI    Patient is a 59-year-old male who presents emergency department from [facility] with a fever.   P stated complaint:   Other systems are as noted in HPI. Constitutional and vital signs reviewed. All other systems reviewed and negative except as noted above.     Physical Exam   ED Triage Vitals [07/18/18 2050]  BP: 91/54  Pulse: 109  Resp: (!) 30  T All other components within normal limits   MANUAL DIFFERENTIAL - Abnormal; Notable for the following:     Lymphocyte Absolute Manual 0.40 (*)     All other components within normal limits   CBC W/ DIFFERENTIAL - Abnormal; Notable for the following: responded well to fluids. He continued to improve while in the emergency department. Anemia, and renal failure noted on laboratory evaluation. Urinalysis consistent with urinary tract infection.   CT demonstrated left nephrostomy tube with mild left-si

## 2018-07-19 NOTE — PROGRESS NOTES
[address] [facility] dosing service    Initial Pharmacokinetic Consult for Vancomycin Dosing     Amaury Thompson is a 80year old male admitted on 7/19/18 who is being treated for sepsis and UTI. Pharmacy has been asked to dose Vancomycin by Dr. Tari Barroso.     He is a currently. Based on the above:    1. This patient will receive a loading dose of Vancomycin  2 gm IVPB (25mg/kg, capped at 2g) x 1 dose, followed by 1.25 gm Q 48 hours  based upon,  weight, renal function, and pharmacokinetics.     2.  Pharmacy ordered V

## 2018-07-19 NOTE — PROGRESS NOTES
[facility]  Progress Note     5 [facility]  Patient Status:  Inpatient    10/3/1927 MRN [id number]   [facility] 7NE-A Attending Stephanie Eaton MD   Hosp Day # 0 PCP Alanis Arango MD [address] [place] [address]     Chief Complaint: UTI    S: Patient ove atorvastatin  20 mg Oral Nightly   • piperacillin-tazobactam  3.375 g Intravenous Q12H   • Heparin Sodium (Porcine)  5,000 Units Subcutaneous 2 times per day   • [START ON 7/20/2018] vancomycin  15 mg/kg Intravenous Q48H   • epoetin manolo  10,000 Units Subc

## 2018-07-19 NOTE — PROGRESS NOTES
[facility] Pharmacy Dosing Service  Antibiotic Dosing    Focus: vancomycin    Indication: sepsis    Other antibiotics: Zosyn 3.375g IVPB in ED    Allergies: is allergic to duricef [cefadroxil].     Vitals: BP 91/54   Pulse 109   Temp (!) 101.2 °F (38.4 °C) (Oral)

## 2018-07-19 NOTE — PLAN OF CARE
Notified hospitalist per POA request. Per POA \"I did not want the urologist and nephrologist consults that were assigned and expressed this to the oncall hospitalist over night. \"

## 2018-07-20 ENCOUNTER — APPOINTMENT (OUTPATIENT)
Dept: GENERAL RADIOLOGY | Facility: HOSPITAL | Age: 83
DRG: 698 | End: 2018-07-20
Attending: INTERNAL MEDICINE
Payer: MEDICARE

## 2018-07-20 LAB
ANION GAP SERPL CALC-SCNC: 9 MMOL/L (ref 0–18)
BUN BLD-MCNC: 61 MG/DL (ref 8–20)
BUN/CREAT SERPL: 17.7 (ref 10–20)
CALCIUM BLD-MCNC: 8 MG/DL (ref 8.3–10.3)
CHLORIDE SERPL-SCNC: 114 MMOL/L (ref 101–111)
CO2 SERPL-SCNC: 23 MMOL/L (ref 22–32)
CREAT BLD-MCNC: 3.44 MG/DL (ref 0.7–1.3)
ERYTHROCYTE [DISTWIDTH] IN BLOOD BY AUTOMATED COUNT: 15 % (ref 11.5–16)
GLUCOSE BLD-MCNC: 102 MG/DL (ref 70–99)
HCT VFR BLD AUTO: 28.3 % (ref 37–53)
HGB BLD-MCNC: 8.4 G/DL (ref 13–17)
MCH RBC QN AUTO: 29.5 PG (ref 27–33.2)
MCHC RBC AUTO-ENTMCNC: 29.7 G/DL (ref 31–37)
MCV RBC AUTO: 99.3 FL (ref 80–99)
OSMOLALITY SERPL CALC.SUM OF ELEC: 319 MOSM/KG (ref 275–295)
PLATELET # BLD AUTO: 172 10(3)UL (ref 150–450)
POTASSIUM SERPL-SCNC: 4 MMOL/L (ref 3.6–5.1)
RBC # BLD AUTO: 2.85 X10(6)UL (ref 3.8–5.8)
RED CELL DISTRIBUTION WIDTH-SD: 54.6 FL (ref 35.1–46.3)
SODIUM SERPL-SCNC: 146 MMOL/L (ref 136–144)
WBC # BLD AUTO: 7.3 X10(3) UL (ref 4–13)

## 2018-07-20 PROCEDURE — 99232 SBSQ HOSP IP/OBS MODERATE 35: CPT | Performed by: INTERNAL MEDICINE

## 2018-07-20 PROCEDURE — 71045 X-RAY EXAM CHEST 1 VIEW: CPT | Performed by: INTERNAL MEDICINE

## 2018-07-20 RX ORDER — ACETAMINOPHEN 325 MG/1
650 TABLET ORAL EVERY 4 HOURS PRN
Status: DISCONTINUED | OUTPATIENT
Start: 2018-07-20 | End: 2018-07-22

## 2018-07-20 NOTE — PLAN OF CARE
Up to chair 3 times today for meals. Redressed and took picture of buttock wound. Appetite fair. BP more stable. Patient more energetic. Has been up with 1 assist today and walker. Had BM today.     CARDIOVASCULAR - ADULT    • Maintains optimal cardiac outp

## 2018-07-20 NOTE — CM/SW NOTE
Pt is a 79 yo male admitted for UTI. Pt is a readmission. Pt came from [facility] where he was for GONZALEZ. Spoke with pt's dtr April Flores who says she does not want pt to return to [facility] but would like pt to go for GONZALEZ at another SNF.   SNF list given to dtr

## 2018-07-20 NOTE — PROGRESS NOTES
[facility]  Progress Note     5 [facility]  Patient Status:  Inpatient    10/3/1927 MRN [id number]   [facility] 7NE-A Attending Aure Frank MD   Hosp Day # 1 PCP Matthew Tobar MD [facility]     Chief Complaint:back pain     S: Patie • Clopidogrel Bisulfate  75 mg Oral Daily   • finasteride  5 mg Oral Daily   • mirtazapine  15 mg Oral Nightly   • famoTIDine  20 mg Oral Daily   • atorvastatin  20 mg Oral Nightly   • piperacillin-tazobactam  3.375 g Intravenous Q12H   • Heparin Sodium

## 2018-07-20 NOTE — PLAN OF CARE
Assumed care at [address]. Pt a/ox4, forgetful, [place]. VSS, nsr per tele. RA, w/ productive cough. No distress noted. Tylenol prn given for back pain and abdominal muscle pain when coughing. Pt requested warm packs on abd and back w/ relief noted.  Repositioned for

## 2018-07-20 NOTE — PROGRESS NOTES
[facility]  Nephrology Progress Note    5 [facility]  Patient Status:  Inpatient    10/3/1927 MRN [id number]   [facility] 7NE-A Attending Vivienne Billingsley MD   Hosp Day # 1 PCP Carmen Comas MD [facility]       SUBJECTIVE:  Feels better tod extremities  Skin: Warm and dry, no rash        Labs:     Recent Labs   Lab  07/18/18 2057 07/19/18   0543  07/20/18   0520   WBC  5.0  8.3  7.3   HGB  10.3*  8.6*  8.4*   MCV  94.8  98.6  99.3*   PLT  203.0  161.0  172.0   BAND  16   --    --        Re mg/dl or so related to cardiorenal syndrome/HTN    #3. UTI- urine cx >100k klebsiella and ecoli. sens pending. On abx    #4. Anemia- likely due to CKD with lower hgb in setting of infection.   ESAs started      Questions/concerns were discussed with logan

## 2018-07-21 LAB
ANION GAP SERPL CALC-SCNC: 8 MMOL/L (ref 0–18)
BUN BLD-MCNC: 43 MG/DL (ref 8–20)
BUN/CREAT SERPL: 16.6 (ref 10–20)
CALCIUM BLD-MCNC: 7.8 MG/DL (ref 8.3–10.3)
CHLORIDE SERPL-SCNC: 116 MMOL/L (ref 101–111)
CO2 SERPL-SCNC: 23 MMOL/L (ref 22–32)
CREAT BLD-MCNC: 2.59 MG/DL (ref 0.7–1.3)
GLUCOSE BLD-MCNC: 112 MG/DL (ref 70–99)
OSMOLALITY SERPL CALC.SUM OF ELEC: 316 MOSM/KG (ref 275–295)
POTASSIUM SERPL-SCNC: 4.2 MMOL/L (ref 3.6–5.1)
SODIUM SERPL-SCNC: 147 MMOL/L (ref 136–144)

## 2018-07-21 PROCEDURE — 99232 SBSQ HOSP IP/OBS MODERATE 35: CPT | Performed by: INTERNAL MEDICINE

## 2018-07-21 RX ORDER — LEVOFLOXACIN 5 MG/ML
750 INJECTION, SOLUTION INTRAVENOUS
Status: DISCONTINUED | OUTPATIENT
Start: 2018-07-21 | End: 2018-07-24

## 2018-07-21 RX ORDER — TORSEMIDE 20 MG/1
40 TABLET ORAL DAILY
Status: DISCONTINUED | OUTPATIENT
Start: 2018-07-21 | End: 2018-07-21

## 2018-07-21 NOTE — CONSULTS
[facility] Pharmacy Note:  Renal Adjustment for levofloxacin (Chelsi Rolling)    [address] is a 80year old male who has been prescribed levofloxacin (LEVAQUIN) 750 mg every 24 hrs.   CrCl is estimated creatinine clearance is 19.6 mL/min (A) (based on SCr of 2.59 mg

## 2018-07-21 NOTE — PLAN OF CARE
Assumed care at 0730. Pt A&O x4, forgetful at times. On room air. Strong productive cough. NSR on tele. Torsemide restarted today; however, daughter requesting that pt not take torsemide any longer- Dr. Bola Gaines updated.  Fatima and Lt nephrostomy tube rufus

## 2018-07-21 NOTE — PROGRESS NOTES
[facility]  Nephrology Progress Note    Erica Fulton Patient Status:  Inpatient    10/3/1927 MRN [id number]   [facility] 7NE-A Attending Kodak Ramos MD   Hosp Day # 2 PCP Hulda Force MD [facility]       SUBJECTIVE:  No overnight hali extremities  Skin: Warm and dry, no rash        Labs:     Recent Labs   Lab  07/18/18 2057 07/19/18   0543  07/20/18   0520   WBC  5.0  8.3  7.3   HGB  10.3*  8.6*  8.4*   MCV  94.8  98.6  99.3*   PLT  203.0  161.0  172.0   BAND  16   --    --        Re again today and would anticipate ongoing recovery. Resume diuretics- was on torsemide 40 daily    #2. CKD III- baseline creat close to 2.0 mg/dl or so related to cardiorenal syndrome/HTN    #3.   UTI/bacteremia- urine cx >100k klebsiella and ecoli with e

## 2018-07-21 NOTE — PROGRESS NOTES
[facility]  Progress Note     5 [facility]  Patient Status:  Inpatient    10/3/1927 MRN [id number]   [facility] 7NE-A Attending Randal Diaz MD   Hosp Day # 2 PCP Luis Alfredo Michele MD [address] [place] [address]     Chief Complaint: back pain    S: Patie 2057   TROP  <0.046            Imaging: Imaging data reviewed in Epic.     Medications:   • torsemide  40 mg Oral Daily   • levofloxacin  750 mg Intravenous Q48H   • aspirin  81 mg Oral Daily   • bicalutamide  50 mg Oral Daily   • Clopidogrel Bisulfate  75

## 2018-07-21 NOTE — PLAN OF CARE
Assumed care at [address]. AOx4 [place]. Denies any pain or discomfort. Remains on IV ABT. Contact isolation for Hx of MRSA. Fatima and Nephrostomy in place. Plan of care discussed with pt. Call light within reach.

## 2018-07-22 LAB
ANION GAP SERPL CALC-SCNC: 7 MMOL/L (ref 0–18)
BUN BLD-MCNC: 38 MG/DL (ref 8–20)
BUN/CREAT SERPL: 15.2 (ref 10–20)
CALCIUM BLD-MCNC: 7.6 MG/DL (ref 8.3–10.3)
CHLORIDE SERPL-SCNC: 114 MMOL/L (ref 101–111)
CO2 SERPL-SCNC: 24 MMOL/L (ref 22–32)
CREAT BLD-MCNC: 2.5 MG/DL (ref 0.7–1.3)
GLUCOSE BLD-MCNC: 96 MG/DL (ref 70–99)
OSMOLALITY SERPL CALC.SUM OF ELEC: 309 MOSM/KG (ref 275–295)
POTASSIUM SERPL-SCNC: 4 MMOL/L (ref 3.6–5.1)
SODIUM SERPL-SCNC: 145 MMOL/L (ref 136–144)

## 2018-07-22 PROCEDURE — 99232 SBSQ HOSP IP/OBS MODERATE 35: CPT | Performed by: INTERNAL MEDICINE

## 2018-07-22 RX ORDER — ACETAMINOPHEN 500 MG
500 TABLET ORAL EVERY 4 HOURS
Status: DISCONTINUED | OUTPATIENT
Start: 2018-07-22 | End: 2018-07-24

## 2018-07-22 NOTE — PROGRESS NOTES
[facility]  Nephrology Progress Note    [address] [facility]  Patient Status:  Inpatient    10/3/1927 MRN [id number]   [facility] 7NE-A Attending Stephanie Eaton MD   Hosp Day # 3 PCP Pama Backers MD [facility]       SUBJECTIVE:  No overnight hali clubbing, cyanosis or edema.   Neurologic: moving all extremities  Skin: Warm and dry, no rash        Labs:     Recent Labs   Lab  07/18/18   2057  07/19/18   0543  07/20/18   0520   WBC  5.0  8.3  7.3   HGB  10.3*  8.6*  8.4*   MCV  94.8  98.6  99.3*   PLT of UTI and prerenal azotemia with recent obstructive uropathy on L s/p PNT. Creat sig better again today and would anticipate ongoing recovery. D/w dtr yesterday who was concerned with ongoing diuretic use. Pt with sig reduced EF and will need diuretics.

## 2018-07-22 NOTE — PROGRESS NOTES
[facility]  Progress Note     5 [facility]  Patient Status:  Inpatient    10/3/1927 MRN [id number]   [facility] 7NE-A Attending Kodak Ramos MD   Hosp Day # 3 PCP Sary Simons MD [address] [place] [address]     Chief Complaint: \"my bottom hurts\" (based on SCr of 2.5 mg/dL (H)). No results for input(s): PTP, INR in the last 168 hours. Recent Labs   Lab  07/18/18 2057   TROP  <0.046            Imaging: Imaging data reviewed in Epic.     Medications:   • acetaminophen  500 mg Oral Q4H   • levo overlaod.  We can consider low dose daily diuretic or every other day.       Jose Calix MD

## 2018-07-22 NOTE — PHYSICAL THERAPY NOTE
PHYSICAL THERAPY EVALUATION - INPATIENT     Room Number: 7703/1838-X  Evaluation Date: 7/22/2018  Type of Evaluation: Initial  Physician Order: PT Eval and Treat    Presenting Problem: fever  Reason for Therapy: Mobility Dysfunction and Discharge Josh ADT, on CIC for retention   • Stented coronary artery    • Syncope    • Uncomfortable fullness after meals    • Visual impairment     macular degeneration       Past Surgical History  Past Surgical History:  No date: ANGIOGRAM  No date: ANGIOPLASTY (CORONA chair with arms (e.g., wheelchair, bedside commode, etc.): A Little   -   Moving from lying on back to sitting on the side of the bed?: A Little   How much help from another person does the patient currently need. ..   -   Moving to and from a bed to a drew moderate. These impairments and comorbidities manifest themselves as functional limitations in independent bed mobility, transfers, and gait, endurance, safety, balance.   The patient is below baseline and would benefit from skilled inpatient PT to address

## 2018-07-22 NOTE — PLAN OF CARE
Assumed pt care at [address] [place] [address] for the night shift. Pt sitting in the chair. Reports sacral discomfort and requesting for Tylenol to be given q4 hrs atc   as he was taking at home. Mepilex applied to coccyx. VSS. Afebrile. Remains on RA w/O2 sats >92%.  in place.

## 2018-07-22 NOTE — PLAN OF CARE
Assumed care at 0730. A&O x4, forgetful. On room air. On room air. Strong productive cough. NSR on tele. Lt nephrostomy tube and wall in place. Per urology service, wall to be dc'd tomorrow and pt can resume self-cath.  Pressure ulcer to coccyx, pt report

## 2018-07-23 LAB
GLUCOSE BLD-MCNC: 110 MG/DL (ref 65–99)
GLUCOSE BLD-MCNC: 90 MG/DL (ref 65–99)

## 2018-07-23 PROCEDURE — 99232 SBSQ HOSP IP/OBS MODERATE 35: CPT | Performed by: INTERNAL MEDICINE

## 2018-07-23 PROCEDURE — 99233 SBSQ HOSP IP/OBS HIGH 50: CPT | Performed by: INTERNAL MEDICINE

## 2018-07-23 NOTE — CONSULTS
[facility]  Inpatient Wound Care Contact Note    [address] Patient Status:  Inpatient    10/3/1927 MRN [id number]   [facility] 7NE-A Attending Nghia Giang MD   Hosp Day # 4 PCP Giancarlo Gooden MD [address] [place] [address]     Attempted to see zohra

## 2018-07-23 NOTE — PLAN OF CARE
Assumed care at [address]. AOx4 [place] and forgetful at times. C/o pain to buttocks. Scheduled Tylenol given and reposition pt. Nephrostomy tube in place. Fatima to be removed at 0600 per order. Plan for G[facility]. Plan of care discussed with pt and family.   Call patricia

## 2018-07-23 NOTE — CM/SW NOTE
Dtr has chosen [employer] for [employer]. Referral made to [facility] via ecin - waiting for a response. Pt has a current DON screen.

## 2018-07-23 NOTE — PROGRESS NOTES
[facility]  Nephrology Progress Note    [address] Patient Status:  Inpatient    10/3/1927 MRN [id number]   [facility] 7NE-A Attending Dwayne Downs MD   Hosp Day # 4 PCP Priscille Senter MD [facility]       SUBJECTIVE:  No overnight hali rhythm, S1, S2 normal  Lungs: Clear without wheezes, rales, rhonchi. Abdomen: Soft, non-tender. + bowel sounds, L PNT noted  Extremities: Without clubbing, cyanosis or edema.   Neurologic: moving all extremities  Skin: Warm and dry, no rash   : LYM#, MON

## 2018-07-23 NOTE — PROGRESS NOTES
[facility]  Progress Note     5 [facility]  Patient Status:  Inpatient    10/3/1927 MRN [id number]   [facility] 7NE-A Attending Clarke Harrell MD   Hosp Day # 4 PCP Gurmeet Pereyra MD [address] [place] [address]     Chief Complaint: UTI     S: Patient ov input(s): PTP, INR in the last 168 hours. Recent Labs   Lab  07/18/18 2057   TROP  <0.046            Imaging: Imaging data reviewed in Epic.     Medications:   • acetaminophen  500 mg Oral Q4H   • levofloxacin  750 mg Intravenous Q48H   • aspirin  81 m

## 2018-07-23 NOTE — CM/SW NOTE
Called to room by daughter to discuss discharge to [facility] SPECIALTY [facility] Family is requesting 1st floor private room and room 121 if available. Birgit- liaison for [facility] informed of the above and will stop in to patients room to speak with family today.

## 2018-07-23 NOTE — PLAN OF CARE
Pt A/Ox4, [place], forgetful at times, on RA, NSR per tele  C/o coccyx pain, scheduled Tylenol given with relief  Lt nephrostomy tube in place, draining well  Pt straight cathed x1   Pt up 1 assist and a walker, tolerating well  PL[facility]

## 2018-07-24 VITALS
BODY MASS INDEX: 24 KG/M2 | WEIGHT: 165 LBS | RESPIRATION RATE: 19 BRPM | SYSTOLIC BLOOD PRESSURE: 114 MMHG | HEART RATE: 91 BPM | OXYGEN SATURATION: 97 % | TEMPERATURE: 98 F | DIASTOLIC BLOOD PRESSURE: 64 MMHG

## 2018-07-24 LAB
ANION GAP SERPL CALC-SCNC: 7 MMOL/L (ref 0–18)
BUN BLD-MCNC: 28 MG/DL (ref 8–20)
BUN/CREAT SERPL: 13 (ref 10–20)
CALCIUM BLD-MCNC: 7.5 MG/DL (ref 8.3–10.3)
CHLORIDE SERPL-SCNC: 114 MMOL/L (ref 101–111)
CO2 SERPL-SCNC: 23 MMOL/L (ref 22–32)
CREAT BLD-MCNC: 2.16 MG/DL (ref 0.7–1.3)
GLUCOSE BLD-MCNC: 88 MG/DL (ref 70–99)
HAV IGM SER QL: 2 MG/DL (ref 1.8–2.5)
OSMOLALITY SERPL CALC.SUM OF ELEC: 303 MOSM/KG (ref 275–295)
POTASSIUM SERPL-SCNC: 4.5 MMOL/L (ref 3.6–5.1)
SODIUM SERPL-SCNC: 144 MMOL/L (ref 136–144)

## 2018-07-24 PROCEDURE — 99233 SBSQ HOSP IP/OBS HIGH 50: CPT | Performed by: INTERNAL MEDICINE

## 2018-07-24 PROCEDURE — 99239 HOSP IP/OBS DSCHRG MGMT >30: CPT | Performed by: INTERNAL MEDICINE

## 2018-07-24 RX ORDER — TORSEMIDE 20 MG/1
10 TABLET ORAL EVERY OTHER DAY
Qty: 60 TABLET | Refills: 0 | Status: SHIPPED | OUTPATIENT
Start: 2018-07-24 | End: 2018-07-25

## 2018-07-24 RX ORDER — LEVOFLOXACIN 750 MG/1
750 TABLET ORAL EVERY OTHER DAY
Qty: 4 TABLET | Refills: 0 | Status: ON HOLD | OUTPATIENT
Start: 2018-07-25 | End: 2018-08-08 | Stop reason: ALTCHOICE

## 2018-07-24 NOTE — PROGRESS NOTES
[facility] HOSPITALIST  Progress Note     [facility] [address] Patient Status:  Inpatient    10/3/1927 MRN [id number]   [facility] 7NE-A Attending Esperanza Gonsalez MD   Hosp Day # 5 PCP Clarice Rojas MD [address] [place] [address]     Chief Complaint: UTI    S: Patient doi PTP, INR in the last 168 hours. Recent Labs   Lab  07/18/18 2057   TROP  <0.046            Imaging: Imaging data reviewed in Epic.     Medications:   • metoprolol Tartrate  12.5 mg Oral 2x Daily(Beta Blocker)   • acetaminophen  500 mg Oral Q4H   • levo

## 2018-07-24 NOTE — CM/SW NOTE
Discharging to [facility] room #121 at 4:30pm per Jesus Cho. Please call report to #[phone].     Meredith Cerna, 07/24/18, 2:13 PM

## 2018-07-24 NOTE — PLAN OF CARE
NURSING DISCHARGE NOTE    Discharged Rehab facility via Wheelchair. Accompanied by Family member and Support staff  Belongings Taken by patient/family. Report called into the [facility] to Wm. Gavi Hollins, all questions and concerns answered.  Discharge packe

## 2018-07-24 NOTE — PLAN OF CARE
Pt A/Ox4, [place], forgetful at times, on RA, NSR per tele  C/o coccyx pain, scheduled Tylenol given with relief  Lt nephrostomy tube in place, draining well  Pt straight cathed x1  Pt up 1 assist and a walker, tolerating well  PLAN: discharge later today to t

## 2018-07-24 NOTE — PROGRESS NOTES
[facility]  Nephrology Progress Note    5 [facility]  Attending:  Malorie Griffith MD       Assessment and Plan:    1) ETHAN- due to obstructive uropathy + cardiorenal syndrome + UTI / dehydration- improving, near baseline     2) CKD 3- baseline Cr < 2 mg 2.16 07/24/2018   BUN 28 07/24/2018    07/24/2018   K 4.5 07/24/2018    07/24/2018   CO2 23.0 07/24/2018   GLU 88 07/24/2018   CA 7.5 07/24/2018   MG 2.0 07/24/2018   PGLU 90 07/23/2018       Imaging: All imaging studies reviewed.     Meds:

## 2018-07-24 NOTE — PLAN OF CARE
Assumed pt care @ 1930. Pt alert and oriented x 4, breathing unlabored on room air. SR on the monitor. Pt ambulated in hallway with staff and walker. Pt with left nephrostomy.    Pt c/o pain on buttock where he has wound, scheduled tylenol given, warm

## 2018-07-24 NOTE — CONSULTS
[facility]  Report of Inpatient Wound Care Consultation     5 [facility]  Patient Status:  Inpatient    10/3/1927 MRN [id number]   [facility] 7NE-A Attending Leda Albert MD   Hosp Day # 5 PCP Sammy Ford MD [facility] [place] (CORONARY)  No date: CATH PERCUTANEOUS  TRANSLUMINAL CORONARY ANGIO*  No date: OTHER SURGICAL HISTORY      Comment: left leg bypass  Smoking status: Never Smoker                                                              Smokeless tobacco: Never Used Equipment:  NA  Offloading/Footwear:  Use of Waffle cushion when in sitting  Compression:  NA    Physical Therapy Wound Goals:  1. Maintain optimal wound healing environment  2. Remove wound bioburden  3.  Decrease periwound edema      PLAN OF CARE:   Sens-

## 2018-07-24 NOTE — DISCHARGE SUMMARY
[facility] HOSPITALIST  DISCHARGE SUMMARY     [address] Patient Status:  Inpatient    10/3/1927 MRN [id number]   [facility] 7NE-A Attending Deandra Ro MD   Hosp Day # 5 PCP Sary Simons MD [address] [place] [address]     Date of Admission: 2018  Solitario per clinical status (symptoms and weight). he was transitioned to oral antibiotic and will be discharged to [facility] today. Daughter aware of POC and agreeable.     Consultants:  • Renal, urology , PT OT, wound care    Discharge Medication List:     Discharge M Refills:  0     multiple vitamin Chew      Chew 1 tablet by mouth daily. Refills:  0     OCUVITE LUTEIN 25 OR      Take 1 tablet by mouth daily. Refills:  0     PEG 3350 Pack  Commonly known as:  MIRALAX      Take 17 g by mouth daily.    Refills:  0

## 2018-07-25 ENCOUNTER — SNF VISIT (OUTPATIENT)
Dept: INTERNAL MEDICINE CLINIC | Age: 83
End: 2018-07-25

## 2018-07-25 ENCOUNTER — PRIOR ORIGINAL RECORDS (OUTPATIENT)
Dept: OTHER | Age: 83
End: 2018-07-25

## 2018-07-25 ENCOUNTER — NURSE ONLY (OUTPATIENT)
Dept: LAB | Age: 83
End: 2018-07-25
Attending: NURSE PRACTITIONER
Payer: MEDICARE

## 2018-07-25 VITALS
SYSTOLIC BLOOD PRESSURE: 96 MMHG | OXYGEN SATURATION: 95 % | TEMPERATURE: 98 F | HEART RATE: 71 BPM | DIASTOLIC BLOOD PRESSURE: 60 MMHG | RESPIRATION RATE: 16 BRPM

## 2018-07-25 DIAGNOSIS — I50.22 CHRONIC SYSTOLIC CONGESTIVE HEART FAILURE (HCC): ICD-10-CM

## 2018-07-25 DIAGNOSIS — N18.30 STAGE 3 CHRONIC KIDNEY DISEASE (HCC): ICD-10-CM

## 2018-07-25 DIAGNOSIS — N18.9 CHRONIC KIDNEY DISEASE, UNSPECIFIED CKD STAGE: ICD-10-CM

## 2018-07-25 DIAGNOSIS — R68.81 EARLY SATIETY: ICD-10-CM

## 2018-07-25 DIAGNOSIS — E55.9 VITAMIN D DEFICIENCY: ICD-10-CM

## 2018-07-25 DIAGNOSIS — N20.1 OBSTRUCTION OF LEFT URETEROPELVIC JUNCTION (UPJ) DUE TO STONE: Primary | ICD-10-CM

## 2018-07-25 DIAGNOSIS — M15.3 OTHER SECONDARY OSTEOARTHRITIS OF MULTIPLE SITES: ICD-10-CM

## 2018-07-25 DIAGNOSIS — R69 DIAGNOSIS UNKNOWN: Primary | ICD-10-CM

## 2018-07-25 DIAGNOSIS — C61 PROSTATE CANCER (HCC): ICD-10-CM

## 2018-07-25 DIAGNOSIS — J45.20 MILD INTERMITTENT ASTHMA WITHOUT COMPLICATION: ICD-10-CM

## 2018-07-25 DIAGNOSIS — N17.9 AKI (ACUTE KIDNEY INJURY) (HCC): ICD-10-CM

## 2018-07-25 DIAGNOSIS — R53.1 GENERALIZED WEAKNESS: ICD-10-CM

## 2018-07-25 DIAGNOSIS — M81.0 AGE-RELATED OSTEOPOROSIS WITHOUT CURRENT PATHOLOGICAL FRACTURE: ICD-10-CM

## 2018-07-25 DIAGNOSIS — R06.00 DOE (DYSPNEA ON EXERTION): ICD-10-CM

## 2018-07-25 LAB
ALBUMIN SERPL-MCNC: 2.7 G/DL (ref 3.5–4.8)
ALBUMIN/GLOB SERPL: 0.7 {RATIO} (ref 1–2)
ALP LIVER SERPL-CCNC: 83 U/L (ref 45–117)
ALT SERPL-CCNC: 36 U/L (ref 17–63)
ANION GAP SERPL CALC-SCNC: 9 MMOL/L (ref 0–18)
AST SERPL-CCNC: 36 U/L (ref 15–41)
BASOPHILS # BLD AUTO: 0.04 X10(3) UL (ref 0–0.1)
BASOPHILS NFR BLD AUTO: 0.5 %
BILIRUB SERPL-MCNC: 0.3 MG/DL (ref 0.1–2)
BUN BLD-MCNC: 27 MG/DL (ref 8–20)
BUN/CREAT SERPL: 12.7 (ref 10–20)
CALCIUM BLD-MCNC: 7.8 MG/DL (ref 8.3–10.3)
CHLORIDE SERPL-SCNC: 114 MMOL/L (ref 101–111)
CO2 SERPL-SCNC: 20 MMOL/L (ref 22–32)
CREAT BLD-MCNC: 2.12 MG/DL (ref 0.7–1.3)
EOSINOPHIL # BLD AUTO: 0.12 X10(3) UL (ref 0–0.3)
EOSINOPHIL NFR BLD AUTO: 1.4 %
ERYTHROCYTE [DISTWIDTH] IN BLOOD BY AUTOMATED COUNT: 15 % (ref 11.5–16)
GLOBULIN PLAS-MCNC: 3.9 G/DL (ref 2.5–3.7)
GLUCOSE BLD-MCNC: 89 MG/DL (ref 70–99)
HAV IGM SER QL: 2 MG/DL (ref 1.8–2.5)
HCT VFR BLD AUTO: 32.4 % (ref 37–53)
HGB BLD-MCNC: 9.7 G/DL (ref 13–17)
IMMATURE GRANULOCYTE COUNT: 0.16 X10(3) UL (ref 0–1)
IMMATURE GRANULOCYTE RATIO %: 1.9 %
LYMPHOCYTES # BLD AUTO: 1.74 X10(3) UL (ref 0.9–4)
LYMPHOCYTES NFR BLD AUTO: 20.8 %
M PROTEIN MFR SERPL ELPH: 6.6 G/DL (ref 6.1–8.3)
MCH RBC QN AUTO: 29.7 PG (ref 27–33.2)
MCHC RBC AUTO-ENTMCNC: 29.9 G/DL (ref 31–37)
MCV RBC AUTO: 99.1 FL (ref 80–99)
MONOCYTES # BLD AUTO: 0.37 X10(3) UL (ref 0.1–1)
MONOCYTES NFR BLD AUTO: 4.4 %
NEUTROPHIL ABS PRELIM: 5.95 X10 (3) UL (ref 1.3–6.7)
NEUTROPHILS # BLD AUTO: 5.95 X10(3) UL (ref 1.3–6.7)
NEUTROPHILS NFR BLD AUTO: 71 %
OSMOLALITY SERPL CALC.SUM OF ELEC: 301 MOSM/KG (ref 275–295)
PLATELET # BLD AUTO: 257 10(3)UL (ref 150–450)
POTASSIUM SERPL-SCNC: 4.6 MMOL/L (ref 3.6–5.1)
RBC # BLD AUTO: 3.27 X10(6)UL (ref 3.8–5.8)
RED CELL DISTRIBUTION WIDTH-SD: 54.4 FL (ref 35.1–46.3)
SODIUM SERPL-SCNC: 143 MMOL/L (ref 136–144)
VIT D+METAB SERPL-MCNC: 24 NG/ML (ref 30–100)
WBC # BLD AUTO: 8.4 X10(3) UL (ref 4–13)

## 2018-07-25 PROCEDURE — 80053 COMPREHEN METABOLIC PANEL: CPT

## 2018-07-25 PROCEDURE — 85025 COMPLETE CBC W/AUTO DIFF WBC: CPT

## 2018-07-25 PROCEDURE — 83735 ASSAY OF MAGNESIUM: CPT

## 2018-07-25 PROCEDURE — 82306 VITAMIN D 25 HYDROXY: CPT

## 2018-07-25 PROCEDURE — 99309 SBSQ NF CARE MODERATE MDM 30: CPT | Performed by: NURSE PRACTITIONER

## 2018-07-25 RX ORDER — SENNA AND DOCUSATE SODIUM 50; 8.6 MG/1; MG/1
1 TABLET, FILM COATED ORAL DAILY PRN
Status: ON HOLD | COMMUNITY
End: 2018-08-08 | Stop reason: ALTCHOICE

## 2018-07-25 RX ORDER — ERGOCALCIFEROL (VITAMIN D2) 1250 MCG
CAPSULE ORAL WEEKLY
Status: ON HOLD | COMMUNITY
End: 2018-08-08

## 2018-07-26 NOTE — PROGRESS NOTES
[facility]  : 10/3/1927  Age 80year old  male patient is admitted to Facility: The [address] [place] [address] at [facility].     [address] [place] [address] Drive date:  18  Discharge date to [facility]:  18  ELOS:  14 days  Antici bypass sx   • Peripheral vascular disease (HCC)    • Pneumonia, organism unspecified(486)    • Prostate CA ([facility] [address]     on ADT, on CIC for retention   • Stented coronary artery    • Syncope    • Uncomfortable fullness after meals    • Visual impairment     ma simvastatin 40 MG Oral Tab Take 40 mg by mouth nightly. Disp:  Rfl:    bicalutamide 50 MG Oral Tab Take 50 mg by mouth daily. Disp:  Rfl:    aspirin 81 MG Oral Chew Tab Chew 81 mg by mouth daily.    Disp:  Rfl:    multiple vitamin Oral Chew Tab Chew 1 t or seasonal allergies      PHYSICAL EXAM:  GENERAL HEALTH: well developed, well nourished, in no apparent distress  LINES, TUBES, DRAINS:  nephrostomy tube  SKIN: no rashes, no suspicious lesions, pale, warm, dry  WOUND: +nephrostomy tube site  EYES: PERRL Latest Ref Range: >=60  31 (L)   ANION GAP Latest Ref Range: 0 - 18 mmol/L 9   CALCULATED OSMOLALITY Latest Ref Range: 275 - 295 mOsm/kg 301 (H)   ALKALINE PHOSPHATASE Latest Ref Range: 45 - 117 U/L 83   AST (SGOT) Latest Ref Range: 15 - 41 U/L 36   ALT (S and imaging results reviewed. Medication reconciliation completed. SEE PLAN BELOW  S/p Fever-resolved    Genealized Weakness/Appetite loss  1. PT/OT to evaluate and treat  2. Tylenol 650 mg q4h for fever/pain.   If given for fever, notify MD/NP  3. Re

## 2018-07-27 ENCOUNTER — PRIOR ORIGINAL RECORDS (OUTPATIENT)
Dept: OTHER | Age: 83
End: 2018-07-27

## 2018-07-29 ENCOUNTER — SNF ADMIT/H&P (OUTPATIENT)
Dept: FAMILY MEDICINE CLINIC | Facility: CLINIC | Age: 83
End: 2018-07-29

## 2018-07-29 VITALS
DIASTOLIC BLOOD PRESSURE: 68 MMHG | SYSTOLIC BLOOD PRESSURE: 106 MMHG | OXYGEN SATURATION: 96 % | HEART RATE: 76 BPM | RESPIRATION RATE: 20 BRPM | TEMPERATURE: 97 F

## 2018-07-29 DIAGNOSIS — R31.9 URINARY TRACT INFECTION WITH HEMATURIA, SITE UNSPECIFIED: ICD-10-CM

## 2018-07-29 DIAGNOSIS — I50.22 CHRONIC SYSTOLIC HEART FAILURE (HCC): ICD-10-CM

## 2018-07-29 DIAGNOSIS — R53.81 PHYSICAL DECONDITIONING: ICD-10-CM

## 2018-07-29 DIAGNOSIS — N18.3 ACUTE RENAL FAILURE SUPERIMPOSED ON STAGE 3 CHRONIC KIDNEY DISEASE, UNSPECIFIED ACUTE RENAL FAILURE TYPE: ICD-10-CM

## 2018-07-29 DIAGNOSIS — N20.1 URETEROLITHIASIS: ICD-10-CM

## 2018-07-29 DIAGNOSIS — N39.0 URINARY TRACT INFECTION WITH HEMATURIA, SITE UNSPECIFIED: ICD-10-CM

## 2018-07-29 DIAGNOSIS — A41.51 SEPSIS DUE TO ESCHERICHIA COLI (HCC): Primary | ICD-10-CM

## 2018-07-29 DIAGNOSIS — N17.9 ACUTE RENAL FAILURE SUPERIMPOSED ON STAGE 3 CHRONIC KIDNEY DISEASE, UNSPECIFIED ACUTE RENAL FAILURE TYPE: ICD-10-CM

## 2018-07-29 PROCEDURE — 99306 1ST NF CARE HIGH MDM 50: CPT | Performed by: FAMILY MEDICINE

## 2018-07-29 NOTE — H&P
ED [facility], [address] [facility]    History and Physical    5 [facility]  Patient Status:  No patient class for patient encounter    10/3/1927 MRN [id number]   Location [facility] , [facility] Attending No att.  pro Extrinsic asthma, unspecified    • Eye disease    • Fatigue    • Frequent urination    • GENITO-URINARY DISEASE     prostate CA: straight caths   • Hearing impairment    • Hearing loss    • Hypotension    • Indigestion    • Neuropathy    • Osteoarthritis exhibits no distension and no mass. There is no tenderness. Right urostomy tube in place with no urine output noted in bag. However, according to patient he had just dispensed of recent output but did not notice any sediments or blood.    Lymphadenopathy per routine  O2:room air  Activity:OOB with assist; fall precautions  Diet:regular  Meds    Current Outpatient Prescriptions:   •  epoetin manolo 25826 UNIT/ML Injection Solution, Inject 10,000 Units into the skin once a week., Disp: , Rfl:   •  Senna-Docusa •  denosumab (XGEVA) 120 MG/1.7ML Subcutaneous Solution, Inject 120 mg into the skin every 30 (thirty) days. , Disp: , Rfl:   •  finasteride (PROSCAR) 5 MG Oral Tab, Take 5 mg by mouth daily.   , Disp: , Rfl:   •  VITAMIN A, 10,000 units daily at bedtime

## 2018-07-30 LAB
ALBUMIN: 2.7 G/DL
ALKALINE PHOSPHATATE(ALK PHOS): 83 IU/L
BILIRUBIN TOTAL: 0.3 MG/DL
BUN: 27 MG/DL
CALCIUM: 7.8 MG/DL
CHLORIDE: 114 MEQ/L
CREATININE, SERUM: 2.12 MG/DL
GLOBULIN: 3.9 G/DL
GLUCOSE: 89 MG/DL
HEMATOCRIT: 32.4 %
HEMOGLOBIN: 9.7 G/DL
MAGNESIUM: 2 MG/DL
PLATELETS: 257 K/UL
POTASSIUM, SERUM: 4.6 MEQ/L
PROTEIN, TOTAL: 6.6 G/DL
RED BLOOD COUNT: 3.27 X 10-6/U
SGOT (AST): 36 IU/L
SGPT (ALT): 36 IU/L
SODIUM: 143 MEQ/L
VITAMIN D 25-OH: 24 NG/ML
WHITE BLOOD COUNT: 8.4 X 10-3/U

## 2018-08-01 ENCOUNTER — NURSE ONLY (OUTPATIENT)
Dept: LAB | Age: 83
End: 2018-08-01
Attending: FAMILY MEDICINE
Payer: MEDICARE

## 2018-08-01 DIAGNOSIS — A49.02 METHICILLIN RESISTANT STAPHYLOCOCCUS AUREUS INFECTION: Primary | ICD-10-CM

## 2018-08-01 LAB
ALBUMIN SERPL-MCNC: 2.9 G/DL (ref 3.5–4.8)
ALBUMIN/GLOB SERPL: 0.8 {RATIO} (ref 1–2)
ALP LIVER SERPL-CCNC: 72 U/L (ref 45–117)
ALT SERPL-CCNC: 18 U/L (ref 17–63)
ANION GAP SERPL CALC-SCNC: 8 MMOL/L (ref 0–18)
AST SERPL-CCNC: 17 U/L (ref 15–41)
BILIRUB SERPL-MCNC: 0.4 MG/DL (ref 0.1–2)
BUN BLD-MCNC: 39 MG/DL (ref 8–20)
BUN/CREAT SERPL: 16.1 (ref 10–20)
CALCIUM BLD-MCNC: 8.9 MG/DL (ref 8.3–10.3)
CHLORIDE SERPL-SCNC: 114 MMOL/L (ref 101–111)
CO2 SERPL-SCNC: 21 MMOL/L (ref 22–32)
CREAT BLD-MCNC: 2.42 MG/DL (ref 0.7–1.3)
ERYTHROCYTE [DISTWIDTH] IN BLOOD BY AUTOMATED COUNT: 15.4 % (ref 11.5–16)
GLOBULIN PLAS-MCNC: 3.6 G/DL (ref 2.5–3.7)
GLUCOSE BLD-MCNC: 92 MG/DL (ref 70–99)
HCT VFR BLD AUTO: 31.9 % (ref 37–53)
HGB BLD-MCNC: 9.5 G/DL (ref 13–17)
M PROTEIN MFR SERPL ELPH: 6.5 G/DL (ref 6.1–8.3)
MCH RBC QN AUTO: 29.6 PG (ref 27–33.2)
MCHC RBC AUTO-ENTMCNC: 29.8 G/DL (ref 31–37)
MCV RBC AUTO: 99.4 FL (ref 80–99)
OSMOLALITY SERPL CALC.SUM OF ELEC: 305 MOSM/KG (ref 275–295)
PLATELET # BLD AUTO: 220 10(3)UL (ref 150–450)
POTASSIUM SERPL-SCNC: 4.9 MMOL/L (ref 3.6–5.1)
RBC # BLD AUTO: 3.21 X10(6)UL (ref 3.8–5.8)
RED CELL DISTRIBUTION WIDTH-SD: 55.6 FL (ref 35.1–46.3)
SODIUM SERPL-SCNC: 143 MMOL/L (ref 136–144)
WBC # BLD AUTO: 6.5 X10(3) UL (ref 4–13)

## 2018-08-01 PROCEDURE — 80053 COMPREHEN METABOLIC PANEL: CPT

## 2018-08-01 PROCEDURE — 85027 COMPLETE CBC AUTOMATED: CPT

## 2018-08-03 ENCOUNTER — NURSE ONLY (OUTPATIENT)
Dept: LAB | Age: 83
End: 2018-08-03
Attending: NURSE PRACTITIONER
Payer: MEDICARE

## 2018-08-03 ENCOUNTER — SNF VISIT (OUTPATIENT)
Dept: FAMILY MEDICINE CLINIC | Facility: CLINIC | Age: 83
End: 2018-08-03

## 2018-08-03 DIAGNOSIS — C61 PROSTATE CA (HCC): Primary | ICD-10-CM

## 2018-08-03 DIAGNOSIS — R53.81 PHYSICAL DECONDITIONING: ICD-10-CM

## 2018-08-03 DIAGNOSIS — A41.51 SEPSIS DUE TO ESCHERICHIA COLI (HCC): Primary | ICD-10-CM

## 2018-08-03 DIAGNOSIS — I50.22 CHRONIC SYSTOLIC HEART FAILURE (HCC): ICD-10-CM

## 2018-08-03 LAB — PREALBUMIN: 20.9 MG/DL (ref 20–40)

## 2018-08-03 PROCEDURE — 99307 SBSQ NF CARE SF MDM 10: CPT | Performed by: FAMILY MEDICINE

## 2018-08-03 PROCEDURE — 84134 ASSAY OF PREALBUMIN: CPT

## 2018-08-04 VITALS
TEMPERATURE: 98 F | SYSTOLIC BLOOD PRESSURE: 123 MMHG | RESPIRATION RATE: 19 BRPM | OXYGEN SATURATION: 98 % | HEART RATE: 100 BPM | DIASTOLIC BLOOD PRESSURE: 76 MMHG

## 2018-08-04 NOTE — PROGRESS NOTES
HPI:    Patient ID: Karely Klein is a 80year old male.   Date of Admission:  (Not on file)  [address] [place] [address] Drive date:  7/18/18  Discharge date to [facility]:  7/24/18  ELOS:  14 days  Anticipated discharge date:  8/7/18  HPI  Mr. Everton Yanez is a pleasant 89-year-o by mouth every evening. Disp:  Rfl:    ergocalciferol 88385 units Oral Cap Take by mouth once a week. Disp:  Rfl:    metoprolol Tartrate 25 MG Oral Tab Take 0.5 tablets (12.5 mg total) by mouth 2x Daily(Beta Blocker).  Disp: 60 tablet Rfl: 0   levofloxacin Oropharynx is clear and moist.   Eyes: Conjunctivae are normal. No scleral icterus. Neck: Neck supple. No thyromegaly present. Cardiovascular: Normal rate, regular rhythm and normal heart sounds. No murmur heard.   Pulmonary/Chest: Effort normal and

## 2018-08-07 ENCOUNTER — PRIOR ORIGINAL RECORDS (OUTPATIENT)
Dept: OTHER | Age: 83
End: 2018-08-07

## 2018-08-07 ENCOUNTER — APPOINTMENT (OUTPATIENT)
Dept: ULTRASOUND IMAGING | Facility: HOSPITAL | Age: 83
DRG: 699 | End: 2018-08-07
Attending: EMERGENCY MEDICINE
Payer: MEDICARE

## 2018-08-07 ENCOUNTER — SNF DISCHARGE (OUTPATIENT)
Dept: INTERNAL MEDICINE CLINIC | Age: 83
End: 2018-08-07

## 2018-08-07 ENCOUNTER — HOSPITAL ENCOUNTER (INPATIENT)
Facility: HOSPITAL | Age: 83
LOS: 1 days | Discharge: HOME HEALTH | DRG: 699 | End: 2018-08-09
Attending: EMERGENCY MEDICINE | Admitting: HOSPITALIST
Payer: MEDICARE

## 2018-08-07 ENCOUNTER — OFFICE VISIT (OUTPATIENT)
Dept: NEPHROLOGY | Facility: CLINIC | Age: 83
End: 2018-08-07
Payer: MEDICARE

## 2018-08-07 VITALS — DIASTOLIC BLOOD PRESSURE: 60 MMHG | SYSTOLIC BLOOD PRESSURE: 90 MMHG | BODY MASS INDEX: 23 KG/M2 | WEIGHT: 163.38 LBS

## 2018-08-07 DIAGNOSIS — N13.9 OBSTRUCTIVE UROPATHY: ICD-10-CM

## 2018-08-07 DIAGNOSIS — N17.9 AKI (ACUTE KIDNEY INJURY) (HCC): ICD-10-CM

## 2018-08-07 DIAGNOSIS — N20.1 OBSTRUCTION OF LEFT URETEROPELVIC JUNCTION (UPJ) DUE TO STONE: Primary | ICD-10-CM

## 2018-08-07 DIAGNOSIS — C61 PROSTATE CANCER (HCC): ICD-10-CM

## 2018-08-07 DIAGNOSIS — R53.1 GENERALIZED WEAKNESS: ICD-10-CM

## 2018-08-07 DIAGNOSIS — N20.1 URETEROLITHIASIS: ICD-10-CM

## 2018-08-07 DIAGNOSIS — N18.9 ACUTE ON CHRONIC RENAL INSUFFICIENCY: Primary | ICD-10-CM

## 2018-08-07 DIAGNOSIS — N18.4 CKD (CHRONIC KIDNEY DISEASE) STAGE 4, GFR 15-29 ML/MIN (HCC): Primary | ICD-10-CM

## 2018-08-07 DIAGNOSIS — I13.0 CARDIORENAL SYNDROME WITH RENAL FAILURE, STAGE 1-4 OR UNSPECIFIED CHRONIC KIDNEY DISEASE, WITH HEART FAILURE (HCC): ICD-10-CM

## 2018-08-07 DIAGNOSIS — T83.098A MALFUNCTION OF NEPHROSTOMY TUBE (HCC): ICD-10-CM

## 2018-08-07 DIAGNOSIS — N28.9 ACUTE ON CHRONIC RENAL INSUFFICIENCY: Primary | ICD-10-CM

## 2018-08-07 DIAGNOSIS — N13.39 OTHER HYDRONEPHROSIS: ICD-10-CM

## 2018-08-07 DIAGNOSIS — N18.30 STAGE 3 CHRONIC KIDNEY DISEASE (HCC): ICD-10-CM

## 2018-08-07 DIAGNOSIS — R53.1 WEAKNESS: ICD-10-CM

## 2018-08-07 LAB
ALBUMIN SERPL-MCNC: 2.9 G/DL (ref 3.5–4.8)
ALBUMIN/GLOB SERPL: 0.7 {RATIO} (ref 1–2)
ALP LIVER SERPL-CCNC: 78 U/L (ref 45–117)
ALT SERPL-CCNC: 11 U/L (ref 17–63)
ANION GAP SERPL CALC-SCNC: 6 MMOL/L (ref 0–18)
AST SERPL-CCNC: 13 U/L (ref 15–41)
BASOPHILS NFR BLD AUTO: 0.4 %
BILIRUB SERPL-MCNC: 0.3 MG/DL (ref 0.1–2)
BUN BLD-MCNC: 60 MG/DL (ref 8–20)
BUN/CREAT SERPL: 18.2 (ref 10–20)
CALCIUM BLD-MCNC: 9.8 MG/DL (ref 8.3–10.3)
CHLORIDE SERPL-SCNC: 110 MMOL/L (ref 101–111)
CO2 SERPL-SCNC: 22 MMOL/L (ref 22–32)
CREAT BLD-MCNC: 3.3 MG/DL (ref 0.7–1.3)
EOSINOPHIL NFR BLD AUTO: 1.5 %
ERYTHROCYTE [DISTWIDTH] IN BLOOD BY AUTOMATED COUNT: 15.6 % (ref 11.5–16)
GLOBULIN PLAS-MCNC: 4.3 G/DL (ref 2.5–3.7)
GLUCOSE BLD-MCNC: 113 MG/DL (ref 70–99)
HCT VFR BLD AUTO: 31.5 % (ref 37–53)
HGB BLD-MCNC: 9.9 G/DL (ref 13–17)
IMMATURE GRANULOCYTE RATIO %: 0.4 %
LACTIC ACID: 1 MMOL/L (ref 0.5–2)
LYMPHOCYTES NFR BLD AUTO: 17.3 %
M PROTEIN MFR SERPL ELPH: 7.2 G/DL (ref 6.1–8.3)
MCH RBC QN AUTO: 30.2 PG (ref 27–33.2)
MCHC RBC AUTO-ENTMCNC: 31.4 G/DL (ref 31–37)
MCV RBC AUTO: 96 FL (ref 80–99)
MONOCYTES NFR BLD AUTO: 8.5 %
NEUTROPHILS NFR BLD AUTO: 71.9 %
OSMOLALITY SERPL CALC.SUM OF ELEC: 304 MOSM/KG (ref 275–295)
PLATELET # BLD AUTO: 168 10(3)UL (ref 150–450)
PLATELET MORPHOLOGY: NORMAL
POTASSIUM SERPL-SCNC: 4.6 MMOL/L (ref 3.6–5.1)
RBC # BLD AUTO: 3.28 X10(6)UL (ref 3.8–5.8)
RED CELL DISTRIBUTION WIDTH-SD: 54.3 FL (ref 35.1–46.3)
SODIUM SERPL-SCNC: 138 MMOL/L (ref 136–144)
WBC # BLD AUTO: 6.7 X10(3) UL (ref 4–13)

## 2018-08-07 PROCEDURE — 1111F DSCHRG MED/CURRENT MED MERGE: CPT | Performed by: INTERNAL MEDICINE

## 2018-08-07 PROCEDURE — 76775 US EXAM ABDO BACK WALL LIM: CPT | Performed by: EMERGENCY MEDICINE

## 2018-08-07 PROCEDURE — 99214 OFFICE O/P EST MOD 30 MIN: CPT | Performed by: INTERNAL MEDICINE

## 2018-08-07 PROCEDURE — 99223 1ST HOSP IP/OBS HIGH 75: CPT | Performed by: HOSPITALIST

## 2018-08-07 PROCEDURE — 99316 NF DSCHRG MGMT 30 MIN+: CPT | Performed by: NURSE PRACTITIONER

## 2018-08-07 RX ORDER — SODIUM CHLORIDE 9 MG/ML
INJECTION, SOLUTION INTRAVENOUS ONCE
Status: COMPLETED | OUTPATIENT
Start: 2018-08-07 | End: 2018-08-07

## 2018-08-07 NOTE — PROGRESS NOTES
Nephrology Progress Note      ASSESSMENT/PLAN:        1) ETHAN- due to obstructive uropathy + cardiorenal syndrome + UTI / dehydration- resolved; renal function near baseline- no further w/u.  Home health to check labs weekly x 4    2) CKD 3- baseline Cr prev VASCULAR DISEASE 2007    left leg bypass sx   • Peripheral vascular disease (HCC)    • Pneumonia, organism unspecified(486)    • Prostate CA (HCC)     on ADT, on CIC for retention   • Stented coronary artery    • Syncope    • Uncomfortable fullness after m Oral Chew Tab Chew 1 tablet by mouth daily. Disp:  Rfl:    Clopidogrel Bisulfate (PLAVIX) 75 MG Oral Tab Take 75 mg by mouth daily. Disp:  Rfl:    mirtazapine (REMERON) 15 MG Oral Tab Take 15 mg by mouth nightly.  Disp:  Rfl:    Calcium Carbonate-Vitamin D

## 2018-08-08 ENCOUNTER — APPOINTMENT (OUTPATIENT)
Dept: INTERVENTIONAL RADIOLOGY/VASCULAR | Facility: HOSPITAL | Age: 83
DRG: 699 | End: 2018-08-08
Attending: EMERGENCY MEDICINE
Payer: MEDICARE

## 2018-08-08 PROBLEM — T83.098A MALFUNCTION OF NEPHROSTOMY TUBE (HCC): Status: ACTIVE | Noted: 2018-08-08

## 2018-08-08 LAB
ANION GAP SERPL CALC-SCNC: 10 MMOL/L (ref 0–18)
BILIRUB UR QL STRIP.AUTO: NEGATIVE
BUN BLD-MCNC: 56 MG/DL (ref 8–20)
BUN/CREAT SERPL: 17.7 (ref 10–20)
CALCIUM BLD-MCNC: 9.4 MG/DL (ref 8.3–10.3)
CHLORIDE SERPL-SCNC: 112 MMOL/L (ref 101–111)
CLARITY UR REFRACT.AUTO: CLEAR
CO2 SERPL-SCNC: 21 MMOL/L (ref 22–32)
COLOR UR AUTO: YELLOW
CREAT BLD-MCNC: 3.17 MG/DL (ref 0.7–1.3)
GLUCOSE BLD-MCNC: 95 MG/DL (ref 70–99)
GLUCOSE UR STRIP.AUTO-MCNC: NEGATIVE MG/DL
HAV IGM SER QL: 2.2 MG/DL (ref 1.8–2.5)
INR BLD: 1.18 (ref 0.9–1.1)
KETONES UR STRIP.AUTO-MCNC: NEGATIVE MG/DL
NITRITE UR QL STRIP.AUTO: NEGATIVE
OSMOLALITY SERPL CALC.SUM OF ELEC: 311 MOSM/KG (ref 275–295)
PH UR STRIP.AUTO: 5.5 [PH] (ref 4.5–8)
POTASSIUM SERPL-SCNC: 4.4 MMOL/L (ref 3.6–5.1)
PSA SERPL DL<=0.01 NG/ML-MCNC: 14.8 SECONDS (ref 12–14.1)
SODIUM SERPL-SCNC: 143 MMOL/L (ref 136–144)
SP GR UR STRIP.AUTO: 1.01 (ref 1–1.03)
UROBILINOGEN UR STRIP.AUTO-MCNC: 0.2 MG/DL

## 2018-08-08 PROCEDURE — BT121ZZ FLUOROSCOPY OF LEFT KIDNEY USING LOW OSMOLAR CONTRAST: ICD-10-PCS | Performed by: RADIOLOGY

## 2018-08-08 PROCEDURE — 0T25X0Z CHANGE DRAINAGE DEVICE IN KIDNEY, EXTERNAL APPROACH: ICD-10-PCS | Performed by: RADIOLOGY

## 2018-08-08 PROCEDURE — 99232 SBSQ HOSP IP/OBS MODERATE 35: CPT | Performed by: STUDENT IN AN ORGANIZED HEALTH CARE EDUCATION/TRAINING PROGRAM

## 2018-08-08 PROCEDURE — 99223 1ST HOSP IP/OBS HIGH 75: CPT | Performed by: INTERNAL MEDICINE

## 2018-08-08 RX ORDER — LEVOFLOXACIN 500 MG/1
500 TABLET, FILM COATED ORAL EVERY OTHER DAY
Status: DISCONTINUED | OUTPATIENT
Start: 2018-08-08 | End: 2018-08-08

## 2018-08-08 RX ORDER — LEVOFLOXACIN 5 MG/ML
INJECTION, SOLUTION INTRAVENOUS
Status: COMPLETED
Start: 2018-08-08 | End: 2018-08-08

## 2018-08-08 RX ORDER — ACETAMINOPHEN 325 MG/1
650 TABLET ORAL EVERY 6 HOURS PRN
Status: DISCONTINUED | OUTPATIENT
Start: 2018-08-08 | End: 2018-08-09

## 2018-08-08 RX ORDER — HEPARIN SODIUM 5000 [USP'U]/ML
5000 INJECTION, SOLUTION INTRAVENOUS; SUBCUTANEOUS EVERY 8 HOURS SCHEDULED
Status: DISCONTINUED | OUTPATIENT
Start: 2018-08-08 | End: 2018-08-09

## 2018-08-08 RX ORDER — SENNA AND DOCUSATE SODIUM 50; 8.6 MG/1; MG/1
1 TABLET, FILM COATED ORAL EVERY EVENING
Status: DISCONTINUED | OUTPATIENT
Start: 2018-08-08 | End: 2018-08-09

## 2018-08-08 RX ORDER — ONDANSETRON 2 MG/ML
4 INJECTION INTRAMUSCULAR; INTRAVENOUS EVERY 6 HOURS PRN
Status: DISCONTINUED | OUTPATIENT
Start: 2018-08-08 | End: 2018-08-09

## 2018-08-08 RX ORDER — ASPIRIN 81 MG/1
81 TABLET, CHEWABLE ORAL DAILY
Status: DISCONTINUED | OUTPATIENT
Start: 2018-08-08 | End: 2018-08-09

## 2018-08-08 RX ORDER — BICALUTAMIDE 50 MG/1
50 TABLET, FILM COATED ORAL DAILY
Status: DISCONTINUED | OUTPATIENT
Start: 2018-08-08 | End: 2018-08-09

## 2018-08-08 RX ORDER — MIRTAZAPINE 15 MG/1
15 TABLET, FILM COATED ORAL NIGHTLY
Status: DISCONTINUED | OUTPATIENT
Start: 2018-08-08 | End: 2018-08-09

## 2018-08-08 RX ORDER — POLYETHYLENE GLYCOL 3350 17 G/17G
17 POWDER, FOR SOLUTION ORAL DAILY
COMMUNITY
End: 2019-01-01

## 2018-08-08 RX ORDER — TORSEMIDE 20 MG/1
20 TABLET ORAL DAILY PRN
COMMUNITY
End: 2019-01-01

## 2018-08-08 RX ORDER — ATORVASTATIN CALCIUM 20 MG/1
20 TABLET, FILM COATED ORAL NIGHTLY
Status: DISCONTINUED | OUTPATIENT
Start: 2018-08-08 | End: 2018-08-09

## 2018-08-08 RX ORDER — CLOPIDOGREL BISULFATE 75 MG/1
75 TABLET ORAL DAILY
Status: DISCONTINUED | OUTPATIENT
Start: 2018-08-08 | End: 2018-08-09

## 2018-08-08 RX ORDER — VITS A,C,E/LUTEIN/MINERALS 300MCG-200
1 TABLET ORAL
COMMUNITY
End: 2019-01-01

## 2018-08-08 RX ORDER — POLYETHYLENE GLYCOL 3350 17 G/17G
17 POWDER, FOR SOLUTION ORAL DAILY PRN
Status: DISCONTINUED | OUTPATIENT
Start: 2018-08-08 | End: 2018-08-09

## 2018-08-08 RX ORDER — FAMOTIDINE 20 MG/1
40 TABLET ORAL DAILY
Status: DISCONTINUED | OUTPATIENT
Start: 2018-08-08 | End: 2018-08-09

## 2018-08-08 RX ORDER — BISACODYL 10 MG
10 SUPPOSITORY, RECTAL RECTAL
Status: DISCONTINUED | OUTPATIENT
Start: 2018-08-08 | End: 2018-08-09

## 2018-08-08 RX ORDER — LIDOCAINE HYDROCHLORIDE 10 MG/ML
INJECTION, SOLUTION EPIDURAL; INFILTRATION; INTRACAUDAL; PERINEURAL
Status: COMPLETED
Start: 2018-08-08 | End: 2018-08-08

## 2018-08-08 RX ORDER — MIDAZOLAM HYDROCHLORIDE 1 MG/ML
INJECTION INTRAMUSCULAR; INTRAVENOUS
Status: COMPLETED
Start: 2018-08-08 | End: 2018-08-08

## 2018-08-08 NOTE — PLAN OF CARE
Problem: Patient/Family Goals  Goal: Patient/Family Long Term Goal  Patient's Long Term Goal: \"be back home\"    Interventions:  - See additional Care Plan goals for specific interventions   Outcome: Progressing    Goal: Patient/Family Short Term Goal  Pa Assess patient stability and activity tolerance for standing, transferring and ambulating w/ or w/o assistive devices  - Assist with transfers and ambulation using safe patient handling equipment as needed  - Ensure adequate protection for wounds/incisions

## 2018-08-08 NOTE — CONSULTS
[facility]  Inpatient Wound Care Contact Note    [address] Patient Status:  Inpatient    10/3/1927 MRN [id number]   [facility] 2NE-A Attending Ryne Venegas MD   Hosp Day # 0 PCP Earl De La O MD [facility]     Received consult, per

## 2018-08-08 NOTE — CM/SW NOTE
Pt evaluated by home care liaison and was arranged with [facility] Kyle Mccarthy

## 2018-08-08 NOTE — HOME CARE LIAISON
Referral received from [facility]. Met with daughter and patient in length to discuss home health care services when discharged home (possibly today). Daughter, Elier Palomares agreeable to [facility]idential home health care for RN PT OT HHA.   Thank you for the referral.

## 2018-08-08 NOTE — PHYSICAL THERAPY NOTE
PHYSICAL THERAPY EVALUATION - INPATIENT     Room Number: 0402/5346-C  Evaluation Date: 8/8/2018  Type of Evaluation: Initial  Physician Order: PT Eval and Treat    Presenting Problem: oliguria due to obstruction of nephrostomy tube  Reason for Therap HISTORY      Comment: left leg bypass    HOME SITUATION  Type of Home: House   Home Layout: One level  Stairs to Enter : 4  Railing: Yes  Stairs to Bedroom: 0       Lives With: Daughter (and CHETNA)     Patient Owned Equipment: Rolling walker;Cane       Prior patient currently need. ..   -   Moving to and from a bed to a chair (including a wheelchair)?: A Little   -   Need to walk in hospital room?: A Little   -   Climbing 3-5 steps with a railing?: A Little       AM-PAC Score:  Raw Score: 19   PT Approx Degree moderate. These impairments and comorbidities manifest themselves as functional limitations in independent bed mobility, transfers, gait, and stair negotiation.   The patient is below baseline and would benefit from skilled inpatient PT to address the abov

## 2018-08-08 NOTE — PROGRESS NOTES
[facility] HOSPITALIST  Progress Note     5 [facility]  Patient Status:  Inpatient    10/3/1927 MRN [id number]   [facility] 2NE-A Attending Tobi Flores MD   Hosp Day # 0 PCP Angela Perez MD [address] [place] [address]     Chief Complaint: Low ostomy output Clopidogrel Bisulfate  75 mg Oral Daily   • metoprolol Tartrate  12.5 mg Oral 2x Daily(Beta Blocker)   • mirtazapine  15 mg Oral Nightly   • famoTIDine  40 mg Oral Daily   • Senna-Docusate Sodium  1 tablet Oral QPM   • atorvastatin  20 mg Oral Nightly   •

## 2018-08-08 NOTE — ED PROVIDER NOTES
Patient Seen in: [facility] Emergency Department    History   Patient presents with:  Cath Tube Problem (gastrointestinal, urinary, integumentary)    Stated Complaint: NEPHROTOMY TUBE NOT DRAINING    HPI    Patient is a 80-year-old male with extensi Uncomfortable fullness after meals    • Visual impairment     macular degeneration       Past Surgical History:  No date: ANGIOGRAM  No date: ANGIOPLASTY (CORONARY)  No date: CATH PERCUTANEOUS  TRANSLUMINAL CORONARY ANGIO*  No date: OTHER SURGICAL HISTORY 3.30 (*)     Calculated Osmolality 304 (*)     GFR, Non- 16 (*)     GFR, -American 18 (*)     Albumin 2.9 (*)     Globulin  4.3 (*)     A/G Ratio 0.7 (*)     All other components within normal limits   ALT (SGPT) - Abnormal; Notable PM       80-year-old male with history of aortic stenosis, cardiorenal syndrome with chronic kidney disease, recent admissions here for urosepsis and left percutaneous nephrostomy tube placement due to mechanical obstruction because of his prostate cancer, patient will be admitted with gentle IV fluid rehydration, consult IR put in so they can replace his nephrostomy tube in the morning. Case discussed with Dr. Katerina Guevara who will admit.       Disposition and Plan     Clinical Impression:  Acute on chronic renal

## 2018-08-08 NOTE — PLAN OF CARE
GENITOURINARY - ADULT    • Absence of urinary retention Progressing        MUSCULOSKELETAL - ADULT    • Return mobility to safest level of function Progressing    • Maintain proper alignment of affected body part Progressing        Patient/Family Goals

## 2018-08-08 NOTE — DIETARY NOTE
Nutrition Short Note    Family requesting to see RD for Nutrition Recommendations. Pt with decreased appetite over the past year. Discussed ONS options with pt & dtr. Encouraged small frequent meals for appetite stimulation.   Reviewed high pepe/ high pro

## 2018-08-08 NOTE — CONSULTS
[facility] Urology   Consultation Note    [address] Patient Status:  Inpatient    10/3/1927 MRN [id number]   [facility] 2NE-A Attending Rama Manrique MD   Hosp Day # 0 PCP Evin Maurer MD [facility]     Reason for Consultation: History:   Diagnosis Date   • Abdominal pain    • Anemia 10/5/2013   • Arthritis    • Back pain    • Bloating    • Cancer (HCC)     prostate CA; hx lymphoma;skin CA   • CORONARY ARTERY DISEASE     hx stents   • Diarrhea, unspecified    • Dizziness    • Eas mg, Oral, Nightly  •  Heparin Sodium (Porcine) 5000 UNIT/ML injection 5,000 Units, 5,000 Units, Subcutaneous, Q8H [address]  •  acetaminophen (TYLENOL) tab 650 mg, 650 mg, Oral, Q6H PRN  •  PEG 3350 (MIRALAX) powder packet 17 g, 17 g, Oral, Daily PRN  •  bisacod lesion. KIDNEYS:    There 2 large calculi within the right renal pelvis measuring 2.6 cm in 2.0 cm respectively. There is a nephrostomy catheter on the left. There is left hydro pelvis. ADRENALS:  No mass or enlargement.     AORTA/VASCULAR:  No aneu 6.1 x 6.3 cm. Marked renal cortical scarring. ECHOGENICITY:  Normal.  HYDRONEPHROSIS:  None. CYSTS/STONES/MASSES:  There is a 2.8 cm cyst midpole right kidney. There is a 2.1 cm calculus within the mid to lower pole of the right kidney.        LEFT [facility] Hyperkalemia     Acute renal failure superimposed on stage 3 chronic kidney disease (HCC)     Obstructive uropathy     Acute congestive heart failure (HCC)     CKD (chronic kidney disease), stage III (HCC)     Cardiomyopathy (HCC)     Obstruction of left u

## 2018-08-08 NOTE — PLAN OF CARE
Contact precautions maintained    High fall risk precautions maintained  High risk for skin breakdown precautions maintained    Comments: Pt is A&OX4, VSS on RA w/ and maintaining NSR on telemetry.   Admitted w/left nephrostomy tube obstruction; tube exc intact  INTERVENTIONS  - Assess and document risk factors for pressure ulcer development  - Assess and document skin integrity  - Monitor for areas of redness and/or skin breakdown  - Initiate interventions, skin care algorithm/standards of care as needed mobility/gait  Interventions:  - Assess patient's functional ability and stability  - Promote increasing activity/tolerance for mobility and gait  - Educate and engage patient/family in tolerated activity level and precautions  - Recommend use of  RW for t

## 2018-08-08 NOTE — H&P
[facility] HOSPITALIST  History and Physical     5 [facility]  Patient Status:  Emergency    10/3/1927 MRN [id number]   Location [address] [facility] [address] [facility] [address] Attending No att. providers found   Hosp Day # 0 PCP Samm Ballard MD [facility]     Chief Co use drugs. Family History:   Family History   Problem Relation Age of Onset   • Cancer Mother        Allergies:   Duricef [Cefadroxil]    RASH    Medications:    No current facility-administered medications on file prior to encounter.    Current Outpatie VITAMIN A 10,000 units daily at bedtime Disp:  Rfl:        Review of Systems:   A comprehensive 14 point review of systems was completed. Pertinent positives and negatives noted in the HPI. Physical Exam:    /61   Pulse 89   Temp 97.8 °F (36. reviewed in Epic. ASSESSMENT / PLAN:     1. ETHAN due to obstructive uropathy, CKD 3/4  1. IR for neph tube exchange   2. Urology   3. Follow urine culture  1. CAD s/p PCI/MERLYN prox LAD 6/26/18  1. ASA/plavix/statin   2. PAD  1. ASA/plavix/statin/BB  3.

## 2018-08-08 NOTE — PROGRESS NOTES
[facility] Pharmacy Note:  Renal Adjustment for levofloxacin (Lonia Flaming)    [address] [facility] [address] is a 80year old male who has been prescribed levofloxacin (LEVAQUIN) 750 mg every 48 hrs.   CrCl is estimated creatinine clearance is 15.5 mL/min (A) (based on SCr of 3.3 mg/

## 2018-08-08 NOTE — CONSULTS
[facility]  Report of Consultation    [facility] Patient Status:  Inpatient    10/3/1927 MRN [id number]   [facility] 2NE-A Attending Isael Mansfield MD   Hosp Day # 0 PCP Pama Backers MD [facility]       Assessment / Plan:    1) ETHAN- d unspecified    • Eye disease    • Fatigue    • Frequent urination    • GENITO-URINARY DISEASE     prostate CA: straight caths   • Hearing impairment    • Hearing loss    • Hypotension    • Indigestion    • Neuropathy    • Osteoarthritis    • Osteoporosis 10 mg, 10 mg, Rectal, Daily PRN  •  ondansetron HCl (ZOFRAN) injection 4 mg, 4 mg, Intravenous, Q6H PRN    No current outpatient prescriptions on file. Review of Systems:  Please see HPI for pertinent positives.  10 point review of systems otherwise revi

## 2018-08-08 NOTE — CM/SW NOTE
Pt admitted from home where residential hhc was going to begin. [place] [facility] [place]. Awaiting therapy eval for further dc planning.  sw to follow

## 2018-08-09 VITALS
OXYGEN SATURATION: 97 % | DIASTOLIC BLOOD PRESSURE: 63 MMHG | WEIGHT: 166.69 LBS | HEIGHT: 71 IN | SYSTOLIC BLOOD PRESSURE: 99 MMHG | TEMPERATURE: 98 F | BODY MASS INDEX: 23.34 KG/M2 | RESPIRATION RATE: 18 BRPM | HEART RATE: 84 BPM

## 2018-08-09 LAB
ANION GAP SERPL CALC-SCNC: 6 MMOL/L (ref 0–18)
BUN BLD-MCNC: 59 MG/DL (ref 8–20)
BUN/CREAT SERPL: 20.6 (ref 10–20)
CALCIUM BLD-MCNC: 9.2 MG/DL (ref 8.3–10.3)
CHLORIDE SERPL-SCNC: 109 MMOL/L (ref 101–111)
CO2 SERPL-SCNC: 21 MMOL/L (ref 22–32)
CREAT BLD-MCNC: 2.86 MG/DL (ref 0.7–1.3)
ERYTHROCYTE [DISTWIDTH] IN BLOOD BY AUTOMATED COUNT: 15.1 % (ref 11.5–16)
GLUCOSE BLD-MCNC: 111 MG/DL (ref 70–99)
HCT VFR BLD AUTO: 32.4 % (ref 37–53)
HGB BLD-MCNC: 9.9 G/DL (ref 13–17)
MCH RBC QN AUTO: 29.1 PG (ref 27–33.2)
MCHC RBC AUTO-ENTMCNC: 30.6 G/DL (ref 31–37)
MCV RBC AUTO: 95.3 FL (ref 80–99)
OSMOLALITY SERPL CALC.SUM OF ELEC: 299 MOSM/KG (ref 275–295)
PLATELET # BLD AUTO: 186 10(3)UL (ref 150–450)
POTASSIUM SERPL-SCNC: 4.7 MMOL/L (ref 3.6–5.1)
RBC # BLD AUTO: 3.4 X10(6)UL (ref 3.8–5.8)
RED CELL DISTRIBUTION WIDTH-SD: 52.8 FL (ref 35.1–46.3)
SODIUM SERPL-SCNC: 136 MMOL/L (ref 136–144)
WBC # BLD AUTO: 7.9 X10(3) UL (ref 4–13)

## 2018-08-09 PROCEDURE — 99233 SBSQ HOSP IP/OBS HIGH 50: CPT | Performed by: INTERNAL MEDICINE

## 2018-08-09 PROCEDURE — 99239 HOSP IP/OBS DSCHRG MGMT >30: CPT | Performed by: STUDENT IN AN ORGANIZED HEALTH CARE EDUCATION/TRAINING PROGRAM

## 2018-08-09 NOTE — PROGRESS NOTES
[facility]  Urology Progress Note    Angie Fulton Patient Status:  Inpatient    10/3/1927 MRN [id number]   [facility] 2NE-A Attending Hussain Fu MD   Hosp Day # 1 PCP Helen Reveal MD [facility]     Subjective:  [facility]  is a(n)

## 2018-08-09 NOTE — CONSULTS
[facility]  Report of Inpatient Wound Care Consultation     [address] [facility]  Patient Status:  Inpatient    10/3/1927 MRN [id number]   [facility] 2NE-A Attending Rama Manrique MD   Hosp Day # 1 PCP Evin Maurer MD [address] Allergies:  @ALLERGY    Laboratory Data:  Recent Labs   Lab  08/07/18 2003 08/07/18   2058  08/08/18   0602 08/08/18 0712 08/09/18   0546   WBC  6.7   --    --    --   7.9   HGB  9.9*   --    --    --   9.9*   HCT  31.5*   --    --    --   32. [facility] [address]  [place], [address]  [phone]  Spectralink: [phone]  Pager: [phone]  : [phone]

## 2018-08-09 NOTE — PLAN OF CARE
Problem: GENITOURINARY - ADULT  Goal: Absence of urinary retention  INTERVENTIONS:  - Assess patient’s ability to void and empty bladder  - Monitor intake/output and perform bladder scan as needed  - Follow urinary retention protocol/standard of care  - Co 1240: Spoke to [facility] in wound care, to come check patient's sacrum as patient's daughter states aloe vesta not improving redness

## 2018-08-09 NOTE — PROGRESS NOTES
5 [facility] , 83/1927, 80year old, male is being discharged from [address] [place] [address] at [address] [facility]    Date of Admission:7/24/18    Date of Discharge:8/7/18                                 Admitting Diagnoses:s/p fever d/t percussion and auscultation+slight GARCIA with exertion  CARDIOVASCULAR: S1, S2 normal, RRR; no S3, no S4; , no click, no murmur  ABDOMEN:  normal active BS+, soft, nondistended; no organomegaly, no masses; no bruits; nontender, no guarding, no rebound tender Ref Range: 2.5 - 3.7 g/dL 4.3 (H)     LACTIC ACID Latest Ref Range: 0.5 - 2.0 mmol/L  1.0    A/G RATIO Latest Ref Range: 1.0 - 2.0  0.7 (L)     TOTAL PROTEIN Latest Ref Range: 6.1 - 8.3 g/dL 7.2     Albumin Latest Ref Range: 3.5 - 4.8 g/dL 2.9 (L)     WBC q6months  2. Epogen 47515 units sq q weekly  3. Casodex 50 mg qd  4. Xgeva 120 mg IM  5. Zantac 300 mg qd  6. Proscar 5 mg qd     GARCIA  1. Duonebs     Supplements  1. Vitamin A 70098 mg qhs  2. MVI qd  3. Ocuvite qd  4. Calcium Carbonate/Vit.  D 5--/200 mg q

## 2018-08-09 NOTE — CM/SW NOTE
08/09/18 1202   Discharge disposition   Expected discharge disposition Home-Health   Name of Facillity/Home Care/Hospice Residential   notified [facility] liaison of d/c.

## 2018-08-09 NOTE — DISCHARGE SUMMARY
[facility] HOSPITALIST  DISCHARGE SUMMARY     [address] Patient Status:  Inpatient    10/3/1927 MRN [id number]   [facility] 2NE-A Attending Mark Palencia MD   Hosp Day # 1 PCP Nahum Castaneda MD [address] [place] [address]     Date of Admission: 2018  Date hospitalization:   • As above    Incidental or significant findings and recommendations (brief descriptions):  • no    Lab/Test results pending at Discharge:   · no    Consultants:  • Renal, IR    Discharge Medication List:     Discharge Medications      C nightly. Refills:  0     torsemide 20 MG Tabs  Commonly known as:  DEMADEX      Take 20 mg by mouth daily as needed (PRN weight gain >2 pounds).    Refills:  0     VITAMIN A      10,000 units daily at bedtime   Refills:  0     ZANTAC 300 MG Tabs  Generic

## 2018-08-09 NOTE — PLAN OF CARE
D: Patient received orders for discharge    A: Reviewed and demonstrated dressing change for nephrostomy tube, supplies given; reviewed discharge instructions, including follow up appointments to be made; reviewed discharge medications, no changes; wound c

## 2018-08-09 NOTE — PLAN OF CARE
PLAN OF CARE - SHIFT NOTE      Patient is ANOx4, on RA, tele NSR, cont bowel last BM 8/7, does not urinate, has to self-cath at home, straight cath by hospital staff PRN, L Nephrostomy tube (site c/d/i). Up with SBA + walker.  Patient has redness on sacrum,

## 2018-08-09 NOTE — PROGRESS NOTES
[facility]  Nephrology Progress Note    5 [facility]  Attending:  Vasquez Ayala MD       Assessment and Plan:    1) ETHAN- due to obstruction of left percutaneous nephrostomy tube leading to oliguria- replaced by IR 8/8- improving     2) CKD 3- luis carlos Warm and dry, no rashes       Labs:     Lab Results  Component Value Date   WBC 7.9 08/09/2018   HGB 9.9 08/09/2018   HCT 32.4 08/09/2018   .0 08/09/2018   CREATSERUM 2.86 08/09/2018   BUN 59 08/09/2018    08/09/2018   K 4.7 08/09/2018   CL 10

## 2018-08-11 ENCOUNTER — PRIOR ORIGINAL RECORDS (OUTPATIENT)
Dept: OTHER | Age: 83
End: 2018-08-11

## 2018-08-14 ENCOUNTER — LAB REQUISITION (OUTPATIENT)
Dept: LAB | Facility: HOSPITAL | Age: 83
End: 2018-08-14
Attending: INTERNAL MEDICINE
Payer: MEDICARE

## 2018-08-14 ENCOUNTER — PRIOR ORIGINAL RECORDS (OUTPATIENT)
Dept: OTHER | Age: 83
End: 2018-08-14

## 2018-08-14 DIAGNOSIS — I13.0 HYPERTENSIVE HEART AND CHRONIC KIDNEY DISEASE WITH HEART FAILURE AND STAGE 1 THROUGH STAGE 4 CHRONIC KIDNEY DISEASE, OR CHRONIC KIDNEY DISEASE (HCC): ICD-10-CM

## 2018-08-14 DIAGNOSIS — N18.4 CHRONIC KIDNEY DISEASE, STAGE IV (SEVERE) (HCC): ICD-10-CM

## 2018-08-14 LAB
ANION GAP SERPL CALC-SCNC: 9 MMOL/L (ref 0–18)
BASOPHILS # BLD AUTO: 0.06 X10(3) UL (ref 0–0.1)
BASOPHILS NFR BLD AUTO: 0.8 %
BUN BLD-MCNC: 55 MG/DL (ref 8–20)
BUN/CREAT SERPL: 20.5 (ref 10–20)
CALCIUM BLD-MCNC: 9.4 MG/DL (ref 8.3–10.3)
CHLORIDE SERPL-SCNC: 111 MMOL/L (ref 101–111)
CO2 SERPL-SCNC: 23 MMOL/L (ref 22–32)
CREAT BLD-MCNC: 2.68 MG/DL (ref 0.7–1.3)
EOSINOPHIL # BLD AUTO: 0.34 X10(3) UL (ref 0–0.3)
EOSINOPHIL NFR BLD AUTO: 4.4 %
ERYTHROCYTE [DISTWIDTH] IN BLOOD BY AUTOMATED COUNT: 14.6 % (ref 11.5–16)
GLUCOSE BLD-MCNC: 118 MG/DL (ref 70–99)
HCT VFR BLD AUTO: 37.4 % (ref 37–53)
HGB BLD-MCNC: 11.1 G/DL (ref 13–17)
IMMATURE GRANULOCYTE COUNT: 0.09 X10(3) UL (ref 0–1)
IMMATURE GRANULOCYTE RATIO %: 1.2 %
LYMPHOCYTES # BLD AUTO: 2.04 X10(3) UL (ref 0.9–4)
LYMPHOCYTES NFR BLD AUTO: 26.5 %
MCH RBC QN AUTO: 28.9 PG (ref 27–33.2)
MCHC RBC AUTO-ENTMCNC: 29.7 G/DL (ref 31–37)
MCV RBC AUTO: 97.4 FL (ref 80–99)
MONOCYTES # BLD AUTO: 0.4 X10(3) UL (ref 0.1–1)
MONOCYTES NFR BLD AUTO: 5.2 %
NEUTROPHIL ABS PRELIM: 4.76 X10 (3) UL (ref 1.3–6.7)
NEUTROPHILS # BLD AUTO: 4.76 X10(3) UL (ref 1.3–6.7)
NEUTROPHILS NFR BLD AUTO: 61.9 %
OSMOLALITY SERPL CALC.SUM OF ELEC: 312 MOSM/KG (ref 275–295)
PLATELET # BLD AUTO: 280 10(3)UL (ref 150–450)
POTASSIUM SERPL-SCNC: 4.5 MMOL/L (ref 3.6–5.1)
RBC # BLD AUTO: 3.84 X10(6)UL (ref 3.8–5.8)
RED CELL DISTRIBUTION WIDTH-SD: 52.8 FL (ref 35.1–46.3)
SODIUM SERPL-SCNC: 143 MMOL/L (ref 136–144)
WBC # BLD AUTO: 7.7 X10(3) UL (ref 4–13)

## 2018-08-14 PROCEDURE — 80048 BASIC METABOLIC PNL TOTAL CA: CPT | Performed by: INTERNAL MEDICINE

## 2018-08-14 PROCEDURE — 85025 COMPLETE CBC W/AUTO DIFF WBC: CPT | Performed by: INTERNAL MEDICINE

## 2018-08-15 ENCOUNTER — PRIOR ORIGINAL RECORDS (OUTPATIENT)
Dept: OTHER | Age: 83
End: 2018-08-15

## 2018-08-16 LAB
BUN: 55 MG/DL
CHLORIDE: 111 MEQ/L
CREATININE, SERUM: 2.68 MG/DL
HEMATOCRIT: 37.4 %
HEMOGLOBIN: 11.1 G/DL
PLATELETS: 280 K/UL
POTASSIUM, SERUM: 4.5 MEQ/L
RED BLOOD COUNT: 3.84 X 10-6/U
SODIUM: 143 MEQ/L
WHITE BLOOD COUNT: 7.7 X 10-3/U

## 2018-08-17 ENCOUNTER — TELEPHONE (OUTPATIENT)
Dept: NEPHROLOGY | Facility: CLINIC | Age: 83
End: 2018-08-17

## 2018-08-24 ENCOUNTER — LAB REQUISITION (OUTPATIENT)
Dept: LAB | Facility: HOSPITAL | Age: 83
End: 2018-08-24
Payer: MEDICARE

## 2018-08-24 DIAGNOSIS — I13.0 HYPERTENSIVE HEART AND CHRONIC KIDNEY DISEASE WITH HEART FAILURE AND STAGE 1 THROUGH STAGE 4 CHRONIC KIDNEY DISEASE, OR CHRONIC KIDNEY DISEASE (HCC): ICD-10-CM

## 2018-08-24 LAB
ANION GAP SERPL CALC-SCNC: 8 MMOL/L (ref 0–18)
BASOPHILS # BLD AUTO: 0.04 X10(3) UL (ref 0–0.1)
BASOPHILS NFR BLD AUTO: 0.5 %
BUN BLD-MCNC: 50 MG/DL (ref 8–20)
BUN/CREAT SERPL: 19.8 (ref 10–20)
CALCIUM BLD-MCNC: 8.1 MG/DL (ref 8.3–10.3)
CHLORIDE SERPL-SCNC: 114 MMOL/L (ref 101–111)
CO2 SERPL-SCNC: 21 MMOL/L (ref 22–32)
CREAT BLD-MCNC: 2.52 MG/DL (ref 0.7–1.3)
EOSINOPHIL # BLD AUTO: 0.21 X10(3) UL (ref 0–0.3)
EOSINOPHIL NFR BLD AUTO: 2.7 %
ERYTHROCYTE [DISTWIDTH] IN BLOOD BY AUTOMATED COUNT: 14.4 % (ref 11.5–16)
GLUCOSE BLD-MCNC: 108 MG/DL (ref 70–99)
HCT VFR BLD AUTO: 34.5 % (ref 37–53)
HGB BLD-MCNC: 10.6 G/DL (ref 13–17)
IMMATURE GRANULOCYTE COUNT: 0.02 X10(3) UL (ref 0–1)
IMMATURE GRANULOCYTE RATIO %: 0.3 %
LYMPHOCYTES # BLD AUTO: 1.66 X10(3) UL (ref 0.9–4)
LYMPHOCYTES NFR BLD AUTO: 21.4 %
MCH RBC QN AUTO: 28.9 PG (ref 27–33.2)
MCHC RBC AUTO-ENTMCNC: 30.7 G/DL (ref 31–37)
MCV RBC AUTO: 94 FL (ref 80–99)
MONOCYTES # BLD AUTO: 0.44 X10(3) UL (ref 0.1–1)
MONOCYTES NFR BLD AUTO: 5.7 %
NEUTROPHIL ABS PRELIM: 5.37 X10 (3) UL (ref 1.3–6.7)
NEUTROPHILS # BLD AUTO: 5.37 X10(3) UL (ref 1.3–6.7)
NEUTROPHILS NFR BLD AUTO: 69.4 %
OSMOLALITY SERPL CALC.SUM OF ELEC: 310 MOSM/KG (ref 275–295)
PLATELET # BLD AUTO: 216 10(3)UL (ref 150–450)
POTASSIUM SERPL-SCNC: 4.9 MMOL/L (ref 3.6–5.1)
RBC # BLD AUTO: 3.67 X10(6)UL (ref 3.8–5.8)
RED CELL DISTRIBUTION WIDTH-SD: 49.7 FL (ref 35.1–46.3)
SODIUM SERPL-SCNC: 143 MMOL/L (ref 136–144)
WBC # BLD AUTO: 7.7 X10(3) UL (ref 4–13)

## 2018-08-24 PROCEDURE — 85025 COMPLETE CBC W/AUTO DIFF WBC: CPT | Performed by: INTERNAL MEDICINE

## 2018-08-24 PROCEDURE — 80048 BASIC METABOLIC PNL TOTAL CA: CPT | Performed by: INTERNAL MEDICINE

## 2018-08-25 ENCOUNTER — TELEPHONE (OUTPATIENT)
Dept: NEPHROLOGY | Facility: CLINIC | Age: 83
End: 2018-08-25

## 2018-08-29 ENCOUNTER — LAB REQUISITION (OUTPATIENT)
Dept: LAB | Facility: HOSPITAL | Age: 83
End: 2018-08-29
Attending: INTERNAL MEDICINE
Payer: MEDICARE

## 2018-08-29 DIAGNOSIS — N18.9 CHRONIC KIDNEY DISEASE: ICD-10-CM

## 2018-08-29 LAB
ANION GAP SERPL CALC-SCNC: 10 MMOL/L (ref 0–18)
BUN BLD-MCNC: 41 MG/DL (ref 8–20)
BUN/CREAT SERPL: 19.2 (ref 10–20)
CALCIUM BLD-MCNC: 8 MG/DL (ref 8.3–10.3)
CHLORIDE SERPL-SCNC: 117 MMOL/L (ref 101–111)
CO2 SERPL-SCNC: 18 MMOL/L (ref 22–32)
CREAT BLD-MCNC: 2.13 MG/DL (ref 0.7–1.3)
GLUCOSE BLD-MCNC: 102 MG/DL (ref 70–99)
OSMOLALITY SERPL CALC.SUM OF ELEC: 310 MOSM/KG (ref 275–295)
POTASSIUM SERPL-SCNC: 5.4 MMOL/L (ref 3.6–5.1)
SODIUM SERPL-SCNC: 145 MMOL/L (ref 136–144)

## 2018-08-29 PROCEDURE — 80048 BASIC METABOLIC PNL TOTAL CA: CPT | Performed by: INTERNAL MEDICINE

## 2018-08-30 ENCOUNTER — TELEPHONE (OUTPATIENT)
Dept: NEPHROLOGY | Facility: CLINIC | Age: 83
End: 2018-08-30

## 2018-08-30 DIAGNOSIS — E87.5 HYPERKALEMIA: Primary | ICD-10-CM

## 2018-08-30 DIAGNOSIS — N18.4 CKD (CHRONIC KIDNEY DISEASE) STAGE 4, GFR 15-29 ML/MIN (HCC): Primary | ICD-10-CM

## 2018-08-30 DIAGNOSIS — E87.5 HYPERKALEMIA: ICD-10-CM

## 2018-08-30 DIAGNOSIS — D63.1 ANEMIA IN STAGE 4 CHRONIC KIDNEY DISEASE (HCC): ICD-10-CM

## 2018-08-30 DIAGNOSIS — N18.4 ANEMIA IN STAGE 4 CHRONIC KIDNEY DISEASE (HCC): ICD-10-CM

## 2018-08-30 DIAGNOSIS — N17.9 AKI (ACUTE KIDNEY INJURY) (HCC): ICD-10-CM

## 2018-08-30 NOTE — TELEPHONE ENCOUNTER
D/W daughter- renal function better but K borderline- to maintain low K diet and home health to recheck labs next week- aleena taylor

## 2018-08-30 NOTE — TELEPHONE ENCOUNTER
Jan 1404 [facility] lab called. CBC drawn yesterday clotted-will not be run. She will call [email or web address] let them know. Pls enter new order if you want it drawn again.

## 2018-09-06 ENCOUNTER — PRIOR ORIGINAL RECORDS (OUTPATIENT)
Dept: OTHER | Age: 83
End: 2018-09-06

## 2018-09-06 ENCOUNTER — LAB REQUISITION (OUTPATIENT)
Dept: LAB | Facility: HOSPITAL | Age: 83
End: 2018-09-06
Payer: MEDICARE

## 2018-09-06 ENCOUNTER — TELEPHONE (OUTPATIENT)
Dept: NEPHROLOGY | Facility: CLINIC | Age: 83
End: 2018-09-06

## 2018-09-06 DIAGNOSIS — C61 MALIGNANT NEOPLASM OF PROSTATE (HCC): ICD-10-CM

## 2018-09-06 LAB
ANION GAP SERPL CALC-SCNC: 10 MMOL/L (ref 0–18)
BASOPHILS # BLD AUTO: 0.04 X10(3) UL (ref 0–0.1)
BASOPHILS NFR BLD AUTO: 0.5 %
BUN BLD-MCNC: 40 MG/DL (ref 8–20)
BUN/CREAT SERPL: 19 (ref 10–20)
CALCIUM BLD-MCNC: 9.2 MG/DL (ref 8.3–10.3)
CHLORIDE SERPL-SCNC: 113 MMOL/L (ref 101–111)
CO2 SERPL-SCNC: 20 MMOL/L (ref 22–32)
CREAT BLD-MCNC: 2.11 MG/DL (ref 0.7–1.3)
EOSINOPHIL # BLD AUTO: 0.37 X10(3) UL (ref 0–0.3)
EOSINOPHIL NFR BLD AUTO: 4.5 %
ERYTHROCYTE [DISTWIDTH] IN BLOOD BY AUTOMATED COUNT: 15.5 % (ref 11.5–16)
GLUCOSE BLD-MCNC: 88 MG/DL (ref 70–99)
HCT VFR BLD AUTO: 37 % (ref 37–53)
HGB BLD-MCNC: 11.3 G/DL (ref 13–17)
IMMATURE GRANULOCYTE COUNT: 0.03 X10(3) UL (ref 0–1)
IMMATURE GRANULOCYTE RATIO %: 0.4 %
LYMPHOCYTES # BLD AUTO: 2.22 X10(3) UL (ref 0.9–4)
LYMPHOCYTES NFR BLD AUTO: 27 %
MCH RBC QN AUTO: 28.6 PG (ref 27–33.2)
MCHC RBC AUTO-ENTMCNC: 30.5 G/DL (ref 31–37)
MCV RBC AUTO: 93.7 FL (ref 80–99)
MONOCYTES # BLD AUTO: 0.49 X10(3) UL (ref 0.1–1)
MONOCYTES NFR BLD AUTO: 6 %
NEUTROPHIL ABS PRELIM: 5.06 X10 (3) UL (ref 1.3–6.7)
NEUTROPHILS # BLD AUTO: 5.06 X10(3) UL (ref 1.3–6.7)
NEUTROPHILS NFR BLD AUTO: 61.6 %
OSMOLALITY SERPL CALC.SUM OF ELEC: 305 MOSM/KG (ref 275–295)
PLATELET # BLD AUTO: 234 10(3)UL (ref 150–450)
POTASSIUM SERPL-SCNC: 4.8 MMOL/L (ref 3.6–5.1)
RBC # BLD AUTO: 3.95 X10(6)UL (ref 3.8–5.8)
RED CELL DISTRIBUTION WIDTH-SD: 52 FL (ref 35.1–46.3)
SODIUM SERPL-SCNC: 143 MMOL/L (ref 136–144)
WBC # BLD AUTO: 8.2 X10(3) UL (ref 4–13)

## 2018-09-06 PROCEDURE — 85025 COMPLETE CBC W/AUTO DIFF WBC: CPT | Performed by: INTERNAL MEDICINE

## 2018-09-06 PROCEDURE — 80048 BASIC METABOLIC PNL TOTAL CA: CPT | Performed by: INTERNAL MEDICINE

## 2018-09-12 ENCOUNTER — PRIOR ORIGINAL RECORDS (OUTPATIENT)
Dept: OTHER | Age: 83
End: 2018-09-12

## 2018-09-14 ENCOUNTER — HOSPITAL ENCOUNTER (OUTPATIENT)
Dept: CARDIOLOGY CLINIC | Facility: HOSPITAL | Age: 83
Discharge: HOME | End: 2018-09-14
Attending: INTERNAL MEDICINE

## 2018-09-14 ENCOUNTER — MYAURORA ACCOUNT LINK (OUTPATIENT)
Dept: OTHER | Age: 83
End: 2018-09-14

## 2018-09-14 DIAGNOSIS — I70.92 CHRONIC TOTAL OCCLUSION OF ARTERY OF EXTREMITY (HCC): ICD-10-CM

## 2018-09-20 ENCOUNTER — MYAURORA ACCOUNT LINK (OUTPATIENT)
Dept: OTHER | Age: 83
End: 2018-09-20

## 2018-09-20 ENCOUNTER — PRIOR ORIGINAL RECORDS (OUTPATIENT)
Dept: OTHER | Age: 83
End: 2018-09-20

## 2018-09-26 ENCOUNTER — PRIOR ORIGINAL RECORDS (OUTPATIENT)
Dept: OTHER | Age: 83
End: 2018-09-26

## 2018-09-27 LAB
BUN: 40 MG/DL
CALCIUM: 9.2 MG/DL
CHLORIDE: 113 MEQ/L
CREATININE, SERUM: 2.11 MG/DL
GLUCOSE: 88 MG/DL
HEMATOCRIT: 37 %
HEMOGLOBIN: 11.3 G/DL
PLATELETS: 234 K/UL
POTASSIUM, SERUM: 4.8 MEQ/L
RED BLOOD COUNT: 3.95 X 10-6/U
SODIUM: 143 MEQ/L
WHITE BLOOD COUNT: 8.2 X 10-3/U

## 2018-10-03 ENCOUNTER — PRIOR ORIGINAL RECORDS (OUTPATIENT)
Dept: OTHER | Age: 83
End: 2018-10-03

## 2018-10-10 ENCOUNTER — PRIOR ORIGINAL RECORDS (OUTPATIENT)
Dept: OTHER | Age: 83
End: 2018-10-10

## 2018-10-20 ENCOUNTER — HOSPITAL ENCOUNTER (OUTPATIENT)
Dept: LAB | Facility: HOSPITAL | Age: 83
Discharge: HOME | End: 2018-10-20
Attending: INTERNAL MEDICINE
Payer: MEDICARE

## 2018-10-20 ENCOUNTER — PRIOR ORIGINAL RECORDS (OUTPATIENT)
Dept: OTHER | Age: 83
End: 2018-10-20

## 2018-10-20 PROCEDURE — 84450 TRANSFERASE (AST) (SGOT): CPT | Performed by: INTERNAL MEDICINE

## 2018-10-20 PROCEDURE — 36415 COLL VENOUS BLD VENIPUNCTURE: CPT | Performed by: INTERNAL MEDICINE

## 2018-10-20 PROCEDURE — 84460 ALANINE AMINO (ALT) (SGPT): CPT | Performed by: INTERNAL MEDICINE

## 2018-10-24 ENCOUNTER — PRIOR ORIGINAL RECORDS (OUTPATIENT)
Dept: OTHER | Age: 83
End: 2018-10-24

## 2018-11-07 ENCOUNTER — PRIOR ORIGINAL RECORDS (OUTPATIENT)
Dept: OTHER | Age: 83
End: 2018-11-07

## 2018-12-05 ENCOUNTER — PRIOR ORIGINAL RECORDS (OUTPATIENT)
Dept: OTHER | Age: 83
End: 2018-12-05

## 2018-12-19 ENCOUNTER — PRIOR ORIGINAL RECORDS (OUTPATIENT)
Dept: OTHER | Age: 83
End: 2018-12-19

## 2018-12-19 LAB
SGOT (AST): 13 IU/L
SGOT (AST): 7 IU/L
SGPT (ALT): 11 IU/L
SGPT (ALT): 13 IU/L

## 2018-12-31 ENCOUNTER — PRIOR ORIGINAL RECORDS (OUTPATIENT)
Dept: OTHER | Age: 83
End: 2018-12-31

## 2019-01-01 ENCOUNTER — HOSPITAL ENCOUNTER (OUTPATIENT)
Dept: INTERVENTIONAL RADIOLOGY/VASCULAR | Facility: HOSPITAL | Age: 84
Discharge: HOME | End: 2019-01-01
Attending: UROLOGY | Admitting: UROLOGY
Payer: OTHER MISCELLANEOUS

## 2019-01-01 ENCOUNTER — HOSPITAL ENCOUNTER (OUTPATIENT)
Dept: INTERVENTIONAL RADIOLOGY/VASCULAR | Facility: HOSPITAL | Age: 84
Discharge: HOME | End: 2019-01-01
Attending: UROLOGY | Admitting: UROLOGY
Payer: MEDICARE

## 2019-01-01 VITALS
RESPIRATION RATE: 21 BRPM | SYSTOLIC BLOOD PRESSURE: 127 MMHG | HEART RATE: 87 BPM | WEIGHT: 131 LBS | TEMPERATURE: 99 F | HEIGHT: 71 IN | BODY MASS INDEX: 18.34 KG/M2 | DIASTOLIC BLOOD PRESSURE: 111 MMHG | OXYGEN SATURATION: 97 %

## 2019-01-01 VITALS
OXYGEN SATURATION: 100 % | DIASTOLIC BLOOD PRESSURE: 53 MMHG | HEART RATE: 86 BPM | RESPIRATION RATE: 14 BRPM | TEMPERATURE: 100 F | SYSTOLIC BLOOD PRESSURE: 110 MMHG

## 2019-01-01 DIAGNOSIS — N20.1 OBSTRUCTION OF LEFT URETEROPELVIC JUNCTION (UPJ) DUE TO STONE: ICD-10-CM

## 2019-01-01 DIAGNOSIS — N13.30 HYDRONEPHROSIS: ICD-10-CM

## 2019-01-01 PROCEDURE — 85610 PROTHROMBIN TIME: CPT

## 2019-01-01 PROCEDURE — 99152 MOD SED SAME PHYS/QHP 5/>YRS: CPT

## 2019-01-01 PROCEDURE — 0T25X0Z CHANGE DRAINAGE DEVICE IN KIDNEY, EXTERNAL APPROACH: ICD-10-PCS | Performed by: RADIOLOGY

## 2019-01-01 PROCEDURE — 50432 PLMT NEPHROSTOMY CATHETER: CPT

## 2019-01-01 PROCEDURE — 50435 EXCHANGE NEPHROSTOMY CATH: CPT

## 2019-01-01 RX ORDER — PANTOPRAZOLE SODIUM 20 MG/1
20 TABLET, DELAYED RELEASE ORAL
Status: ON HOLD | COMMUNITY
End: 2020-01-01

## 2019-01-01 RX ORDER — ACETAMINOPHEN 650 MG/1
650 SUPPOSITORY RECTAL EVERY 4 HOURS PRN
COMMUNITY

## 2019-01-01 RX ORDER — SODIUM CHLORIDE 9 MG/ML
INJECTION, SOLUTION INTRAVENOUS CONTINUOUS
Status: DISCONTINUED | OUTPATIENT
Start: 2019-01-01 | End: 2019-01-01

## 2019-01-01 RX ORDER — MIDAZOLAM HYDROCHLORIDE 1 MG/ML
INJECTION INTRAMUSCULAR; INTRAVENOUS
Status: COMPLETED
Start: 2019-01-01 | End: 2019-01-01

## 2019-01-01 RX ORDER — LEVOFLOXACIN 5 MG/ML
INJECTION, SOLUTION INTRAVENOUS
Status: COMPLETED
Start: 2019-01-01 | End: 2019-01-01

## 2019-01-01 RX ORDER — MIDODRINE HYDROCHLORIDE 5 MG/1
5 TABLET ORAL 3 TIMES DAILY
COMMUNITY

## 2019-01-01 RX ORDER — FLUDROCORTISONE ACETATE 0.1 MG/1
0.1 TABLET ORAL DAILY
COMMUNITY

## 2019-01-01 RX ORDER — ATROPINE SULFATE 10 MG/ML
2 SOLUTION/ DROPS OPHTHALMIC EVERY 4 HOURS PRN
Status: ON HOLD | COMMUNITY
End: 2020-01-01

## 2019-01-01 RX ORDER — LEVOFLOXACIN 250 MG/1
250 TABLET ORAL DAILY
Status: ON HOLD | COMMUNITY
End: 2020-01-01

## 2019-01-01 RX ORDER — HYDROCODONE BITARTRATE AND ACETAMINOPHEN 5; 325 MG/1; MG/1
1 TABLET ORAL EVERY 8 HOURS
COMMUNITY

## 2019-01-01 RX ORDER — LIDOCAINE HYDROCHLORIDE 10 MG/ML
INJECTION, SOLUTION INFILTRATION; PERINEURAL
Status: COMPLETED
Start: 2019-01-01 | End: 2019-01-01

## 2019-01-01 RX ORDER — ACETAMINOPHEN 325 MG/1
650 TABLET ORAL EVERY 6 HOURS PRN
Status: DISCONTINUED | OUTPATIENT
Start: 2019-01-01 | End: 2019-01-01

## 2019-01-01 RX ORDER — HALOPERIDOL 0.5 MG/1
0.5 TABLET ORAL EVERY 6 HOURS PRN
Status: ON HOLD | COMMUNITY
End: 2020-01-01

## 2019-01-01 RX ORDER — MIRTAZAPINE 30 MG/1
30 TABLET, FILM COATED ORAL NIGHTLY
COMMUNITY

## 2019-01-01 RX ORDER — LORAZEPAM 1 MG/1
1 TABLET ORAL EVERY 4 HOURS PRN
COMMUNITY

## 2019-01-01 RX ADMIN — SODIUM CHLORIDE: 9 INJECTION, SOLUTION INTRAVENOUS at 15:30:00

## 2019-01-02 ENCOUNTER — PRIOR ORIGINAL RECORDS (OUTPATIENT)
Dept: OTHER | Age: 84
End: 2019-01-02

## 2019-02-06 ENCOUNTER — PRIOR ORIGINAL RECORDS (OUTPATIENT)
Dept: OTHER | Age: 84
End: 2019-02-06

## 2019-02-08 ENCOUNTER — PRIOR ORIGINAL RECORDS (OUTPATIENT)
Dept: OTHER | Age: 84
End: 2019-02-08

## 2019-02-28 VITALS
HEART RATE: 50 BPM | SYSTOLIC BLOOD PRESSURE: 110 MMHG | WEIGHT: 170 LBS | BODY MASS INDEX: 23.8 KG/M2 | DIASTOLIC BLOOD PRESSURE: 72 MMHG | HEIGHT: 71 IN

## 2019-02-28 VITALS
WEIGHT: 160 LBS | HEART RATE: 62 BPM | SYSTOLIC BLOOD PRESSURE: 108 MMHG | BODY MASS INDEX: 22.4 KG/M2 | DIASTOLIC BLOOD PRESSURE: 62 MMHG | HEIGHT: 71 IN

## 2019-02-28 VITALS — HEART RATE: 72 BPM | SYSTOLIC BLOOD PRESSURE: 92 MMHG | DIASTOLIC BLOOD PRESSURE: 60 MMHG | WEIGHT: 177 LBS

## 2019-02-28 VITALS
HEIGHT: 71 IN | DIASTOLIC BLOOD PRESSURE: 62 MMHG | HEART RATE: 60 BPM | SYSTOLIC BLOOD PRESSURE: 110 MMHG | WEIGHT: 175 LBS | BODY MASS INDEX: 24.5 KG/M2

## 2019-03-01 VITALS
SYSTOLIC BLOOD PRESSURE: 90 MMHG | BODY MASS INDEX: 25.62 KG/M2 | HEIGHT: 71 IN | HEART RATE: 84 BPM | WEIGHT: 183 LBS | DIASTOLIC BLOOD PRESSURE: 60 MMHG

## 2019-03-06 ENCOUNTER — TELEPHONE (OUTPATIENT)
Dept: CARDIOLOGY | Age: 84
End: 2019-03-06

## 2019-03-06 ENCOUNTER — PRIOR ORIGINAL RECORDS (OUTPATIENT)
Dept: OTHER | Age: 84
End: 2019-03-06

## 2019-03-06 RX ORDER — MIDODRINE HYDROCHLORIDE 5 MG/1
5 TABLET ORAL 3 TIMES DAILY
Qty: 30 TABLET | Status: SHIPPED | COMMUNITY
Start: 2019-03-06

## 2019-03-07 ENCOUNTER — OFFICE VISIT (OUTPATIENT)
Dept: CARDIOLOGY | Age: 84
End: 2019-03-07

## 2019-03-07 VITALS
HEART RATE: 72 BPM | HEIGHT: 71 IN | SYSTOLIC BLOOD PRESSURE: 90 MMHG | WEIGHT: 154 LBS | BODY MASS INDEX: 21.56 KG/M2 | DIASTOLIC BLOOD PRESSURE: 60 MMHG

## 2019-03-07 DIAGNOSIS — I35.0 AORTIC VALVE STENOSIS, ETIOLOGY OF CARDIAC VALVE DISEASE UNSPECIFIED: Primary | ICD-10-CM

## 2019-03-07 DIAGNOSIS — I95.9 HYPOTENSION, UNSPECIFIED HYPOTENSION TYPE: ICD-10-CM

## 2019-03-07 PROBLEM — I70.90 ATHEROSCLEROSIS: Status: ACTIVE | Noted: 2019-03-07

## 2019-03-07 PROBLEM — I25.10 CORONARY ARTERY DISEASE: Status: ACTIVE | Noted: 2019-03-07

## 2019-03-07 PROBLEM — R41.82 ALTERED MENTAL STATUS: Status: ACTIVE | Noted: 2019-03-07

## 2019-03-07 PROBLEM — E78.00 HYPERCHOLESTEROLEMIA: Status: ACTIVE | Noted: 2019-03-07

## 2019-03-07 PROBLEM — H35.30 MACULAR DEGENERATION: Status: ACTIVE | Noted: 2017-04-07

## 2019-03-07 PROBLEM — I50.9 ACUTE CONGESTIVE HEART FAILURE (CMD): Status: ACTIVE | Noted: 2019-03-07

## 2019-03-07 PROBLEM — Z95.5 HISTORY OF HEART ARTERY STENT: Status: ACTIVE | Noted: 2019-03-07

## 2019-03-07 PROBLEM — Z93.6 NEPHROSTOMY STATUS (CMD): Status: ACTIVE | Noted: 2018-11-30

## 2019-03-07 PROBLEM — N18.30 ACUTE RENAL FAILURE SUPERIMPOSED ON STAGE 3 CHRONIC KIDNEY DISEASE (CMD): Status: ACTIVE | Noted: 2019-03-07

## 2019-03-07 PROBLEM — N18.9 ACUTE ON CHRONIC RENAL INSUFFICIENCY: Status: ACTIVE | Noted: 2018-08-07

## 2019-03-07 PROBLEM — T83.098A MALFUNCTION OF NEPHROSTOMY TUBE (CMD): Status: ACTIVE | Noted: 2018-08-08

## 2019-03-07 PROBLEM — Z98.890 HISTORY OF FEMOROPOPLITEAL BYPASS: Status: ACTIVE | Noted: 2019-03-07

## 2019-03-07 PROBLEM — N17.9 ACUTE RENAL FAILURE SUPERIMPOSED ON STAGE 3 CHRONIC KIDNEY DISEASE (CMD): Status: ACTIVE | Noted: 2019-03-07

## 2019-03-07 PROBLEM — N17.9 ACUTE KIDNEY INJURY (CMD): Status: ACTIVE | Noted: 2019-03-07

## 2019-03-07 PROBLEM — N28.9 ACUTE ON CHRONIC RENAL INSUFFICIENCY: Status: ACTIVE | Noted: 2018-08-07

## 2019-03-07 PROBLEM — N18.4 CKD (CHRONIC KIDNEY DISEASE) STAGE 4, GFR 15-29 ML/MIN (CMD): Status: ACTIVE | Noted: 2019-03-07

## 2019-03-07 PROBLEM — I13.10 CARDIORENAL SYNDROME: Status: ACTIVE | Noted: 2019-03-07

## 2019-03-07 PROBLEM — I50.22 CHRONIC SYSTOLIC CONGESTIVE HEART FAILURE (CMD): Status: ACTIVE | Noted: 2019-03-07

## 2019-03-07 PROBLEM — Z91.81 AT RISK FOR FALLING: Status: ACTIVE | Noted: 2017-02-19

## 2019-03-07 PROBLEM — I42.9 CARDIOMYOPATHY (CMD): Status: ACTIVE | Noted: 2019-03-07

## 2019-03-07 PROBLEM — R26.89 BALANCE PROBLEMS: Status: ACTIVE | Noted: 2017-04-07

## 2019-03-07 PROCEDURE — 99214 OFFICE O/P EST MOD 30 MIN: CPT | Performed by: NURSE PRACTITIONER

## 2019-03-07 RX ORDER — TORSEMIDE 20 MG/1
20 TABLET ORAL DAILY PRN
COMMUNITY
Start: 2018-09-20

## 2019-03-07 RX ORDER — MIRTAZAPINE 15 MG/1
15 TABLET, FILM COATED ORAL AT BEDTIME
COMMUNITY
Start: 2015-05-18

## 2019-03-07 RX ORDER — GLUCOSAMINE/CHONDR SU A SOD 750-600 MG
25 TABLET ORAL DAILY
COMMUNITY
Start: 2018-09-20

## 2019-03-07 RX ORDER — RANITIDINE 300 MG/1
300 CAPSULE ORAL DAILY
COMMUNITY
Start: 2013-12-13

## 2019-03-07 RX ORDER — CLOPIDOGREL BISULFATE 75 MG/1
1 TABLET ORAL DAILY
COMMUNITY
Start: 2018-02-12 | End: 2019-04-17 | Stop reason: SDUPTHER

## 2019-03-07 RX ORDER — FLUDROCORTISONE ACETATE 0.1 MG/1
0.1 TABLET ORAL DAILY
COMMUNITY
Start: 2019-01-28

## 2019-03-07 RX ORDER — SIMVASTATIN 40 MG
1 TABLET ORAL DAILY
COMMUNITY
Start: 2018-11-13 | End: 2019-09-26 | Stop reason: SDUPTHER

## 2019-03-07 RX ORDER — PANTOPRAZOLE SODIUM 20 MG/1
20 TABLET, DELAYED RELEASE ORAL DAILY
COMMUNITY

## 2019-03-07 RX ORDER — DIPHENOXYLATE HYDROCHLORIDE AND ATROPINE SULFATE 2.5; .025 MG/1; MG/1
1 TABLET ORAL DAILY
COMMUNITY

## 2019-03-14 ENCOUNTER — TELEPHONE (OUTPATIENT)
Dept: CARDIOLOGY | Age: 84
End: 2019-03-14

## 2019-04-03 ENCOUNTER — PRIOR ORIGINAL RECORDS (OUTPATIENT)
Dept: OTHER | Age: 84
End: 2019-04-03

## 2019-04-17 RX ORDER — CLOPIDOGREL BISULFATE 75 MG/1
TABLET ORAL DAILY
Qty: 90 TABLET | Refills: 3 | Status: SHIPPED | OUTPATIENT
Start: 2019-04-17

## 2019-04-25 RX ORDER — ACETAMINOPHEN 325 MG/1
TABLET ORAL
COMMUNITY

## 2019-04-25 RX ORDER — ACETAMINOPHEN 500 MG
TABLET ORAL
COMMUNITY

## 2019-04-25 RX ORDER — BICALUTAMIDE 50 MG/1
TABLET, FILM COATED ORAL
COMMUNITY

## 2019-04-25 RX ORDER — DOCUSATE SODIUM 100 MG/1
CAPSULE, LIQUID FILLED ORAL
COMMUNITY

## 2019-05-01 ENCOUNTER — PRIOR ORIGINAL RECORDS (OUTPATIENT)
Dept: OTHER | Age: 84
End: 2019-05-01

## 2019-05-29 ENCOUNTER — PRIOR ORIGINAL RECORDS (OUTPATIENT)
Dept: OTHER | Age: 84
End: 2019-05-29

## 2019-06-26 ENCOUNTER — PRIOR ORIGINAL RECORDS (OUTPATIENT)
Dept: OTHER | Age: 84
End: 2019-06-26

## 2019-07-24 ENCOUNTER — PRIOR ORIGINAL RECORDS (OUTPATIENT)
Dept: OTHER | Age: 84
End: 2019-07-24

## 2019-08-21 ENCOUNTER — PRIOR ORIGINAL RECORDS (OUTPATIENT)
Dept: OTHER | Age: 84
End: 2019-08-21

## 2019-09-06 ENCOUNTER — APPOINTMENT (OUTPATIENT)
Dept: CARDIOLOGY | Age: 84
End: 2019-09-06

## 2019-09-27 RX ORDER — SIMVASTATIN 40 MG
TABLET ORAL
Qty: 30 TABLET | Refills: 1 | Status: SHIPPED | OUTPATIENT
Start: 2019-09-27

## 2019-10-10 NOTE — HISTORICAL OFFICE NOTE
Perri Preston MD - 06/11/2019 1:20 PM CDT  Formatting of this note might be different from the original.  Subjective   Patient: Daisy Arciniega is an 80 y.o. male.     Chief Complaint   Patient presents with   • Loss Of Appetite   • Depression Sig - Route: Take 1 tablet by mouth daily. - Oral   Class: Historical Med   pantoprazole 20 mg tablet (Taking)   Sig - Route: Take 20 mg by mouth daily. - Oral   Class: Historical Med   polyethylene glycol 17 gram packet (Taking)   Sig - Route:  Take 17 g b He feels he is getting excellent care w/o hospice and does not want to start this yet; also wants to continue w nephrostomy changes and necessary labs and scans if needed  No hospice at this time which seems reasonable to me if this is the desire of the pa

## 2019-10-10 NOTE — PROCEDURES
CLINICAL HISTORY: Patient has a left nephrostomy tube. He presents for left nephrostomy tube change as part of preventive maintenance.     PROCEDURE PERFORMED: Left nephrostomy drainage catheter check and change    INTERVENTIONAL RADIOLOGIST:  Caleb Delarosa The procedure was performed following all elements of maximal sterile barrier technique.  The field was prepped in a sterile fashion using Chloraprep and a sterile field was used to protect the area of interest. All operators had a hand scrub for cutaneous Then a new 8 Fr nephrostomy drainage catheter was introduced over the guidewire. The guidewire was removed and the distal loop of the drainage catheter was formed and locked in the renal pelvis.   Then contrast was injected and the side holes of the cathet Just before beginning the procedure, Dr. Yves Correa conducted a *time out* to verify the patient's identity and the site and the nature of the procedure to be performed. PROCEDURE/FINDINGS:  Contrast was injected into the existing nephrostomy catheter.  Bello Villaseñor Please note all Radiology and Diagnostic Imaging results are subject to interpretation according to your specific medical history and condition. The results in this report should be reviewed with your ordering or primary care physician as appropriate.    Ot The drainage catheter was then secured to the skin with 0-Prolene suture  and covered with a sterile dressing. The drainage catheter was flushed and connected to a gravity drainage bag.      The patient tolerated the procedure well with no immediate complic Left kidney. The left kidney measures 11.3 cm in length. 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 No definite solid renal mass lesion is seen. No definite hydronephrosis. There is a calculus in the left renal pelvis measuring 1.3 x 0.7 cm.  There is normal vascular Please note all Radiology and Diagnostic Imaging results are subject to interpretation according to your specific medical history and condition. The results in this report should be reviewed with your ordering or primary care physician as appropriate.    St The drainage catheter was secured to the skin with suture and covered with a sterile dressing. The drainage catheter was flushed and connected to a gravity drainage bag. The patient tolerated the procedure well with no immediate complications.     The p Just before beginning the procedure, Dr. Ana Bailon conducted a *time out* to verify the patient's identity and the site and the nature of the procedure to be performed.     PROCEDURE/FINDINGS:    Greenish drainage was noted around the catheter insertion site [employer] Users:     Please note all Radiology and Diagnostic Imaging results are subject to interpretation according to your specific medical history and condition.  The results in this report should be reviewed with your ordering or primary care physician a A guidewire was advanced through the nephrostomy tube into the ureter under fluoroscopy.  The existing catheter was unlocked and removed over the guidewire.     Then a new 8 Fr nephrostomy drainage catheter was introduced over the guidewire.  The guidewire CONSENT: The risks, benefits and alternatives to the procedure were explained to the patient and informed written consent was obtained. The risks of bleeding, infection and other complications associated to this procedure     were explained.     SEDATION: N 1.  Percutaneous nephrostomy drainage catheter change as discussed above. [employer] Users:     Please note all Radiology and Diagnostic Imaging results are subject to interpretation according to your specific medical history and condition.  The results i 4. Compared to previous study from August of 2017, there was a 50-74% stenosis described as outflow of the left femoral-popliteal graft that is not reproduced on today's study.  Also, the previous study suggested the left posterior tibial is patent; it is FINDINGS: On the right, the ABIs are moderately reduced at 0.66 each, and the TBI is moderate to severely reduced at 0.41.  The velocity in the external iliac artery is slightly reduced compared to the left side at 56 cm/sec with a biphasic waveform.  Ther On the left, the JOHN at the PT level is normal at 0.98.  It is mildly reduced at the DP level at 0.80.  The TBI is mildly reduced at 0.59.  Inflow at the external iliac artery is normal with a velocity of 70 cm/sec.  There is mild plaque in the common and 1. Patent left femoral-popliteal bypass graft with no evidence of significant anastomotic or body of graft stenoses. 2. Less than 50% stenosis of the left common and profunda femoral arteries.   There is 1-vessel runoff to the left foot, and that is the FINDINGS: On the right, the ABIs are moderately reduced at 0.66 each, and the TBI is moderate to severely reduced at 0.41. The velocity in the external iliac artery is slightly reduced compared to the left side at 56 cm/sec with a biphasic waveform.   Ther On the left, the JOHN at the PT level is normal at 0.98. It is mildly reduced at the DP level at 0.80. The TBI is mildly reduced at 0.59. Inflow at the external iliac artery is normal with a velocity of 70 cm/sec.   There is mild plaque in the common and 4. Less than 50% right common femoral artery stenosis.  Multiple stenosis in the right superficial femoral artery, greatest being in the distal superficial femoral artery greater than 75% with 50 to 74% stenosis proximally in the distal superficial femoral FINDINGS:  On the right, there are mildly reduced ankle brachial indices in the posterior tibial and dorsalis pedis of 0.64 and 0.66 respectively.  There are moderately reduced toe brachial indices at 0.45 in the visualized portion of the distal external il Yanci D: 2017 T: 2017 Job [id number] C: Dr. Robles How GRAFT EVALUATION     Patient: Nawaf Shankar : 10/03/1927         ACCOUNT: [id number]  Patient Phone: [phone].Compared to previous study from August 18th, 2015, there is now evidence of stenosis in the outflow from the left femoral to popiteal bypass graft in the tibial peroneal trunk 50-74% with two-vessel runoff now noted in the left foot.  There has been progr FINDINGS:  On the right, there are mildly reduced ankle brachial indices in the posterior tibial and dorsalis pedis of 0.64 and 0.66 respectively.  There are moderately reduced toe brachial indices at 0.45 in the visualized portion of the distal external il DARIUS/braxton D: 08/21/2017 T: 08/22/2017 Job [id number] C: Dr. Jana Dinh   Status Results Details     Encounter Summary   2015 August  XR Ankle AP/LAT/OBL RIGHT8/24/2015  [facility]  XR Ankle AP/LAT/OBL RIGHT8/24/2015  [place] There are no acute fractures or dislocations. There are moderate to severe degenerative changes throughout the knee with joint space narrowing and large osteophytes. No significant suprapatellar joint effusion.     Impression: No radiographic evidence of ac 4. Compared to previous study from August of 2014, there is no change in the stenosis of the distal right superficial femoral artery. The left common femoral artery no longer demonstrates an elevated velocity ratio.  The left MARTIN no longer fills criteria fo On the left, the ABIs are 1.02 and 0.93. The TBI is 0.54. Velocities in the external iliac, common and profunda femoral are all normal. 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 A graft is seen from the proximal SFA extending into the popiteal artery.  [place] 4.Compared to previous study from August of 2014, there is no change in the stenosis of the distal right superficial femoral artery. The left common femoral artery no longer demonstrates an elevated velocity ratio.  The left MARTIN no longer fills criteria for On the right, there is mild reduction of the ABIs, 0.84 and 0.82. There is moderate reduction of the TBI, 0.59. There is mild atherosclerosis seen in the common femoral , with normal velocities, suggesting a stenosis of less than 50%.  The external iliac ha Other Result Information   Rikki, [facility] [employer] [facility] Conversion - 2019  1:03 PM CST  FONT COLOR=\"#[id number]\">LOWER EXTREMITY DUPLEX     Patient: Dewey Yin       : 10/03/1927         ACCOUNT: [id number]  Patient Phone: [phone] Technique: Four views obtained. AP , lateral, intercondylar notch view and sunrise view of the patellofemoral joint  are obtained. Findings:    Multiple surgical clips are present within the posterior soft tissues of the knee.  Atherosclerotic changes ar INTERPRETATION: The ABIs on the left leg are normal. The TBIs on the left leg are moderately reduced. Inflow in the external iliac artery is normal. The velocity in the common femoral artery is normal. The profunda is patent with normal velocities.  The SFA 4.Compared to the previous study from 11/8/2013, previous study demonstrated subtotal occlusion of the left common femoral to popiteal graft which is now widely patent without significant stenosis.  Previous study also showed an 80-99% stenosis of the left YESSENIA BENZDimitriscatie Meter MR#: [id number]  REF PHYS:   CSN/ACCT: [de-identified]   ORD DATE: 01/17/2014 14:45:00 ROOM:[facility]  RADORD:   [id number]      ZLQFARWYHI:   SHITAL:      14/59/5944    CWH:42O         SEX: M     IMAGING PROCEDURE: US RENAL    Renal ultrasound:  J DD/DT:   DT/TT: 01/17/2014 17:27:48  DOCID: [id number]  MZD:   0  cc:    cc: Inés Navarro M.D., Attending  This document has been reviewed and approved electronically by Yanet Guajardo M.D. on 01/17/2014 17:27:56.    Other Result Information   Rikki, Rad Result Impression: Multiple bilateral renal cysts as discussed above. Mild  fullness of the right renal collecting system without significant  hydronephrosis. Partial visualization of right renal stent. No  perinephric collections.  Small bilateral renal cysts wer

## 2019-10-16 NOTE — PROCEDURES
[facility]  Procedure Note    5 [facility]  Patient Status:  Outpatient in a Bed    10/3/1927 MRN [id number]   Location 60 [facility] Attending Damián Palomo, [facility] Day # 0 PCP Arie Cruz MD [address] [place] [address]     Procedure:

## 2019-10-16 NOTE — PROGRESS NOTES
Patient received from IR lab. New left nephrostomy tube exchanged and new wall catheter in exchanged, both draining clear yellow urine. Patient A & O. Hemodynamics stable. Nephrostomy dressing, C/D/I. No complaints of pain or discomfort.   Will continue t
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
## 2019-12-17 NOTE — PROCEDURES
[facility]  Pre-Procedure Note    Name: Alberta Damian  MRN#: [id number]  : 10/3/1927    Procedure:  Left perc neph tube placement    Indication: Obstructive left uropathy. Left perc neph tube fell out at nursing home.       Allergies:      Sandra Llanos [

## 2019-12-17 NOTE — PROGRESS NOTES
Pt post neph tube replacement to left lower back/side and draining pink tinged but clear urine. Fatima catheter also in place. Dressing to neph tube c/d/i on arrival back to unit. Pt A&O x4, tolerating PO. 5561 [facility] in [place] called w/ report.

## 2019-12-31 ENCOUNTER — PRIOR ORIGINAL RECORDS (OUTPATIENT)
Dept: OTHER | Age: 84
End: 2019-12-31

## 2020-01-01 ENCOUNTER — EXTERNAL FACILITY (OUTPATIENT)
Dept: INTERNAL MEDICINE CLINIC | Facility: CLINIC | Age: 85
End: 2020-01-01

## 2020-01-01 ENCOUNTER — HOSPITAL ENCOUNTER (OUTPATIENT)
Facility: HOSPITAL | Age: 85
Setting detail: OBSERVATION
Discharge: HOSPICE | End: 2020-01-01
Attending: EMERGENCY MEDICINE | Admitting: STUDENT IN AN ORGANIZED HEALTH CARE EDUCATION/TRAINING PROGRAM
Payer: OTHER MISCELLANEOUS

## 2020-01-01 ENCOUNTER — HOSPITAL ENCOUNTER (OUTPATIENT)
Dept: INTERVENTIONAL RADIOLOGY/VASCULAR | Facility: HOSPITAL | Age: 85
Discharge: HOSPICE | End: 2020-01-01
Attending: UROLOGY | Admitting: UROLOGY
Payer: OTHER MISCELLANEOUS

## 2020-01-01 ENCOUNTER — APPOINTMENT (OUTPATIENT)
Dept: INTERVENTIONAL RADIOLOGY/VASCULAR | Facility: HOSPITAL | Age: 85
End: 2020-01-01
Attending: HOSPITALIST
Payer: OTHER MISCELLANEOUS

## 2020-01-01 VITALS
WEIGHT: 133 LBS | HEART RATE: 89 BPM | BODY MASS INDEX: 18.62 KG/M2 | HEIGHT: 71 IN | SYSTOLIC BLOOD PRESSURE: 155 MMHG | RESPIRATION RATE: 16 BRPM | OXYGEN SATURATION: 97 % | TEMPERATURE: 98 F | DIASTOLIC BLOOD PRESSURE: 73 MMHG

## 2020-01-01 VITALS
HEART RATE: 95 BPM | WEIGHT: 133 LBS | DIASTOLIC BLOOD PRESSURE: 70 MMHG | RESPIRATION RATE: 19 BRPM | HEIGHT: 71 IN | SYSTOLIC BLOOD PRESSURE: 120 MMHG | OXYGEN SATURATION: 97 % | BODY MASS INDEX: 18.62 KG/M2

## 2020-01-01 DIAGNOSIS — I25.10 CORONARY ARTERY DISEASE INVOLVING NATIVE HEART WITHOUT ANGINA PECTORIS, UNSPECIFIED VESSEL OR LESION TYPE: Chronic | ICD-10-CM

## 2020-01-01 DIAGNOSIS — T83.098A MALFUNCTION OF NEPHROSTOMY TUBE (HCC): ICD-10-CM

## 2020-01-01 DIAGNOSIS — R53.1 GENERALIZED WEAKNESS: ICD-10-CM

## 2020-01-01 DIAGNOSIS — R26.2 DIFFICULTY WALKING: ICD-10-CM

## 2020-01-01 DIAGNOSIS — N99.528 NEPHROSTOMY COMPLICATION (HCC): Primary | ICD-10-CM

## 2020-01-01 LAB
ALBUMIN SERPL-MCNC: 2.8 G/DL (ref 3.4–5)
ALBUMIN/GLOB SERPL: 0.9 {RATIO} (ref 1–2)
ALP LIVER SERPL-CCNC: 65 U/L (ref 45–117)
ALT SERPL-CCNC: 9 U/L (ref 16–61)
ANION GAP SERPL CALC-SCNC: 7 MMOL/L (ref 0–18)
APTT PPP: 32 SECONDS (ref 25.4–36.1)
AST SERPL-CCNC: 10 U/L (ref 15–37)
BASOPHILS # BLD AUTO: 0.04 X10(3) UL (ref 0–0.2)
BASOPHILS NFR BLD AUTO: 0.4 %
BILIRUB SERPL-MCNC: 0.2 MG/DL (ref 0.1–2)
BUN BLD-MCNC: 55 MG/DL (ref 7–18)
BUN/CREAT SERPL: 22.4 (ref 10–20)
CALCIUM BLD-MCNC: 8.6 MG/DL (ref 8.5–10.1)
CHLORIDE SERPL-SCNC: 120 MMOL/L (ref 98–112)
CO2 SERPL-SCNC: 22 MMOL/L (ref 21–32)
CREAT BLD-MCNC: 2.45 MG/DL (ref 0.7–1.3)
DEPRECATED RDW RBC AUTO: 51.5 FL (ref 35.1–46.3)
EOSINOPHIL # BLD AUTO: 0.16 X10(3) UL (ref 0–0.7)
EOSINOPHIL NFR BLD AUTO: 1.5 %
ERYTHROCYTE [DISTWIDTH] IN BLOOD BY AUTOMATED COUNT: 13.8 % (ref 11–15)
GLOBULIN PLAS-MCNC: 3.1 G/DL (ref 2.8–4.4)
GLUCOSE BLD-MCNC: 87 MG/DL (ref 70–99)
HCT VFR BLD AUTO: 32.1 % (ref 39–53)
HGB BLD-MCNC: 9.9 G/DL (ref 13–17.5)
IMM GRANULOCYTES # BLD AUTO: 0.06 X10(3) UL (ref 0–1)
IMM GRANULOCYTES NFR BLD: 0.6 %
INR BLD: 0.97 (ref 0.9–1.1)
INR: 1 (ref 0.8–1.3)
LYMPHOCYTES # BLD AUTO: 1.91 X10(3) UL (ref 1–4)
LYMPHOCYTES NFR BLD AUTO: 17.7 %
M PROTEIN MFR SERPL ELPH: 5.9 G/DL (ref 6.4–8.2)
MCH RBC QN AUTO: 31.3 PG (ref 26–34)
MCHC RBC AUTO-ENTMCNC: 30.8 G/DL (ref 31–37)
MCV RBC AUTO: 101.6 FL (ref 80–100)
MONOCYTES # BLD AUTO: 0.72 X10(3) UL (ref 0.1–1)
MONOCYTES NFR BLD AUTO: 6.7 %
NEUTROPHILS # BLD AUTO: 7.93 X10 (3) UL (ref 1.5–7.7)
NEUTROPHILS # BLD AUTO: 7.93 X10(3) UL (ref 1.5–7.7)
NEUTROPHILS NFR BLD AUTO: 73.1 %
OSMOLALITY SERPL CALC.SUM OF ELEC: 322 MOSM/KG (ref 275–295)
PLATELET # BLD AUTO: 169 10(3)UL (ref 150–450)
POTASSIUM SERPL-SCNC: 4.4 MMOL/L (ref 3.5–5.1)
PSA SERPL DL<=0.01 NG/ML-MCNC: 13.3 SECONDS (ref 12.5–14.7)
RBC # BLD AUTO: 3.16 X10(6)UL (ref 3.8–5.8)
SODIUM SERPL-SCNC: 149 MMOL/L (ref 136–145)
WBC # BLD AUTO: 10.8 X10(3) UL (ref 4–11)

## 2020-01-01 PROCEDURE — 0T25X0Z CHANGE DRAINAGE DEVICE IN KIDNEY, EXTERNAL APPROACH: ICD-10-PCS | Performed by: RADIOLOGY

## 2020-01-01 PROCEDURE — 99152 MOD SED SAME PHYS/QHP 5/>YRS: CPT

## 2020-01-01 PROCEDURE — 50435 EXCHANGE NEPHROSTOMY CATH: CPT

## 2020-01-01 PROCEDURE — 99219 INITIAL OBSERVATION CARE,LEVL II: CPT | Performed by: STUDENT IN AN ORGANIZED HEALTH CARE EDUCATION/TRAINING PROGRAM

## 2020-01-01 PROCEDURE — 99304 1ST NF CARE SF/LOW MDM 25: CPT | Performed by: INTERNAL MEDICINE

## 2020-01-01 RX ORDER — LEVOFLOXACIN 5 MG/ML
INJECTION, SOLUTION INTRAVENOUS
Status: COMPLETED
Start: 2020-01-01 | End: 2020-01-01

## 2020-01-01 RX ORDER — SODIUM CHLORIDE 9 MG/ML
INJECTION, SOLUTION INTRAVENOUS CONTINUOUS
Status: DISCONTINUED | OUTPATIENT
Start: 2020-01-01 | End: 2020-01-01

## 2020-01-01 RX ORDER — MIDAZOLAM HYDROCHLORIDE 1 MG/ML
INJECTION INTRAMUSCULAR; INTRAVENOUS
Status: COMPLETED
Start: 2020-01-01 | End: 2020-01-01

## 2020-01-01 RX ORDER — METOCLOPRAMIDE HYDROCHLORIDE 5 MG/ML
5 INJECTION INTRAMUSCULAR; INTRAVENOUS EVERY 8 HOURS PRN
Status: DISCONTINUED | OUTPATIENT
Start: 2020-01-01 | End: 2020-01-01

## 2020-01-01 RX ORDER — ONDANSETRON 2 MG/ML
4 INJECTION INTRAMUSCULAR; INTRAVENOUS EVERY 6 HOURS PRN
Status: DISCONTINUED | OUTPATIENT
Start: 2020-01-01 | End: 2020-01-01

## 2020-01-01 RX ORDER — ACETAMINOPHEN 325 MG/1
650 TABLET ORAL EVERY 6 HOURS PRN
Status: DISCONTINUED | OUTPATIENT
Start: 2020-01-01 | End: 2020-01-01

## 2020-01-01 RX ORDER — LIDOCAINE HYDROCHLORIDE 10 MG/ML
INJECTION, SOLUTION INFILTRATION; PERINEURAL
Status: COMPLETED
Start: 2020-01-01 | End: 2020-01-01

## 2020-01-01 RX ORDER — HYDROCODONE BITARTRATE AND ACETAMINOPHEN 5; 325 MG/1; MG/1
1 TABLET ORAL EVERY 6 HOURS PRN
Status: DISCONTINUED | OUTPATIENT
Start: 2020-01-01 | End: 2020-01-01

## 2020-01-01 RX ORDER — HYDROMORPHONE HYDROCHLORIDE 1 MG/ML
0.5 INJECTION, SOLUTION INTRAMUSCULAR; INTRAVENOUS; SUBCUTANEOUS ONCE
Status: COMPLETED | OUTPATIENT
Start: 2020-01-01 | End: 2020-01-01

## 2020-01-01 RX ORDER — PREDNISONE 10 MG/1
10 TABLET ORAL DAILY
COMMUNITY

## 2020-01-01 RX ORDER — HYDROCODONE BITARTRATE AND ACETAMINOPHEN 5; 325 MG/1; MG/1
2 TABLET ORAL EVERY 4 HOURS PRN
Status: DISCONTINUED | OUTPATIENT
Start: 2020-01-01 | End: 2020-01-01

## 2020-01-01 NOTE — HOSPICE RN NOTE
POC was discussed with Archana Velásquez. This patient will be going from GIP status to Routine Level of Care at home. [employer] coming to  at [employer].

## 2020-01-01 NOTE — PLAN OF CARE
NURSING ADMISSION NOTE      Patient admitted via Cart  Oriented to room. Safety precautions initiated. Bed in low position. Call light in reach. Pt received alert and oriented x4 from ED. VSS. No c/o pain. Family at bedside.  Admission navigator c

## 2020-01-01 NOTE — PROGRESS NOTES
Pharmacy renal dose adjustment for metoclopramide (Reglan)    Jonatan Betts has been prescribed metoclopramide 10 mg every 8 hours as needed for nausea/vomiting.      CrCl cannot be calculated (Patient's most recent lab result is older than the maximum 7 da

## 2020-01-01 NOTE — ED PROVIDER NOTES
Patient Seen in: [facility] Emergency Department      History   Patient presents with:  Urinary Symptoms    Stated Complaint: neprostomy tube displaced    HPI    45-year-old male comes to the hospital dislodged nephrostomy tube in the left side.   Yoko Rooney CYSTOSCOPY URETEROSCOPY Left 7/3/2018    Performed by Tai Desai DO at [facility] MAIN OR   • CYSTOSCOPY URETEROSCOPY Right 9/28/2013    Performed by Noemi Ku MD at [facility] MAIN OR   • [address] [place] Left 10/15/2013    Performed by Tyshawn Armendariz specified.       Medications Prescribed:  Current Discharge Medication List                   Present on Admission  Date Reviewed: 8/7/2018          ICD-10-CM Noted POA    Nephrostomy complication [facility]) Q37.994 1/1/2020 Unknown

## 2020-01-01 NOTE — H&P
[facility]OSPITAL[facility]  History and Physical     Ly [facility] Patient Status:  Emergency    10/3/1927 MRN [id number]   Location 656 [facility] Attending Sonny Garcia MD   Hosp Day # 0 PCP Rosa Boo MD [facility] [facility] BYPASS Left 10/15/2013    Performed by Mateus Gonzales MD at [facility] 1615      left leg bypass       Social History:  reports that he has never smoked.  He has never used smokeless tobacco. He reports that he does not drink alcohol or LORazepam 1 MG Oral Tab, Take 1 mg by mouth every 4 (four) hours as needed for Anxiety. , Disp: , Rfl:   MORPHINE SULFATE, CONCENTRATE, OR, Take 5 mg by mouth every hour as needed.  0.25 cc, Disp: , Rfl:   PROCHLORPERAZINE MALEATE OR, Take 10 mg by mouth e BILT, TP in the last 168 hours. CrCl cannot be calculated (Patient's most recent lab result is older than the maximum 7 days allowed. ). No results for input(s): PTP, INR in the last 168 hours.     No results for input(s): TROP, CK in the last 168 hour

## 2020-01-01 NOTE — HOSPICE RN NOTE
[facility] RN spoke with Dominique Ragland RN to discuss the POC for this patient. Waiting to hear from IR when the Nephrostomy tube will be inserted.

## 2020-01-01 NOTE — CM/SW NOTE
Patient is a hospice patient at [facility]. [phone]. Called to after hours rep, Severino Collins, to alert her patient is being admitted as observation. She reports family did call them at 0300 to let them know they were taking the patient to the ED.  Jul

## 2020-01-01 NOTE — HOSPICE RN NOTE
Patient to ED this a.m for nephrostomy tube replacement. [facility] current with [facility]. Level of care changed to GIP for leaking associated with dislodged nephrostomy tube/pain associated with this.      Spoke to [place] in admitting to update [employer]

## 2020-01-01 NOTE — CONSULTS
[facility]    Report of Consultation    Unknown [facility] Patient Status:  Observation    10/3/1927 MRN [id number]   [facility] 4NW-A Attending Jenise Alston MD   Hosp Day # 0 PCP Khalif Wilson MD [address] [place] [address]     Date of Admission:  2020 Past Surgical History:   Procedure Laterality Date   • ANGIOGRAM     • ANGIOPLASTY (CORONARY)     • CATH PERCUTANEOUS  TRANSLUMINAL CORONARY ANGIOPLASTY     • CYSTOSCOPY URETEROSCOPY Left 7/3/2018    Performed by Kim Solis DO at [place] [address] MORPHINE SULFATE, CONCENTRATE, OR, Take 5 mg by mouth every hour as needed.  0.25 cc        Allergies    Duricef [Cefadroxil]    RASH    Review of Systems:    10 systems reviewed and are negative except as noted in HPI    Physical Exam:   Blood pressure 155 CONCLUSION:  1. The existing left percutaneous nephrostomy tube has been pulled back, with the pigtail portion at the skin. This was removed. 2. Successful placement of a new 10 Thai percutaneous nephrostomy tube via the existing tract.   This is connect

## 2020-01-01 NOTE — ED INITIAL ASSESSMENT (HPI)
Pt was asleep this evening and when he woke up , his neprostomy tube was displaced. Minimal amount of bleeding.

## 2020-01-13 ENCOUNTER — TELEPHONE (OUTPATIENT)
Dept: CARDIOLOGY | Age: 85
End: 2020-01-13

## 2020-01-15 NOTE — PROCEDURES
[facility]  Pre-Procedure Note    Name: Jere Tobias  MRN#: [id number]  : 10/3/1927    Procedure:  Neph tube check/possible change    Indication: Leaking at neph tube site    Allergies:      Duricef [Cefadroxil]    RASH    Pertinent Medications:

## 2020-01-15 NOTE — PROGRESS NOTES
Pt s/p left nephrostomy tube exchange with Dr. Sintia Malik. Pt recovered in holding. Left neph tube site CDI, no oozing. Discharge instructions reviewed with pt and pt son. [facility] came and transported pt back to home via stretcher.  MARTELL jinny

## 2020-01-15 NOTE — PROCEDURES
10 42 [facility] Patient Status:  Outpatient in a Bed    10/3/1927 MRN [id number]   Location 60 B [facility] Attending Damián Palomo DO   Hosp Day # 0 PCP Arie Cruz MD [address] [place] [address]         Brief Procedure Report

## 2020-01-16 NOTE — PROGRESS NOTES
Follow up phone call initiated and daughter c/o pt having fever and pus coming from tube. They notified hospice RN. Dr Yonatan García notified and will follow up with daughter.

## 2020-03-04 RX ORDER — SIMVASTATIN 40 MG
TABLET ORAL
Qty: 30 TABLET | Refills: 0 | OUTPATIENT
Start: 2020-03-04

## 2020-10-09 LAB
% SATURATION: 18 % (CALC) (ref 15–60)
% SATURATION: 21 % (CALC) (ref 15–60)
% SATURATION: 24 % (CALC) (ref 15–60)
% SATURATION: 24 % (CALC) (ref 20–48)
% SATURATION: 25 % (CALC) (ref 15–60)
% SATURATION: 32 % (CALC) (ref 20–48)
% SATURATION: 32 % (CALC) (ref 20–48)
% SATURATION: 34 % (CALC) (ref 15–60)
ALBUMIN/GLOB SERPL: 1.1 (CALC) (ref 1–2.5)
ALBUMIN/GLOB SERPL: 1.2 (CALC) (ref 1–2.5)
ALBUMIN/GLOB SERPL: 1.3 (CALC) (ref 1–2.5)
ALBUMIN/GLOB SERPL: 1.4 (CALC) (ref 1–2.5)
ALBUMIN/GLOB SERPL: 1.5 (CALC) (ref 1–2.5)
ALBUMIN/GLOB SERPL: 1.6 (CALC) (ref 1–2.5)
ALBUMIN/GLOB SERPL: 1.6 (CALC) (ref 1–2.5)
ALBUMIN: 2.7 G/DL
ALBUMIN: 3.4 G/DL (ref 3.6–5.1)
ALBUMIN: 3.6 G/DL (ref 3.6–5.1)
ALBUMIN: 3.7 G/DL (ref 3.6–5.1)
ALBUMIN: 3.8 G/DL (ref 3.6–5.1)
ALBUMIN: 3.9 G/DL (ref 3.6–5.1)
ALBUMIN: 3.9 G/DL (ref 3.6–5.1)
ALBUMIN: 4 G/DL (ref 3.6–5.1)
ALBUMIN: 4 G/DL (ref 3.6–5.1)
ALBUMIN: 4.1 G/DL (ref 3.6–5.1)
ALBUMIN: 4.1 G/DL (ref 3.6–5.1)
ALBUMIN: 4.2 G/DL (ref 3.6–5.1)
ALKALINE PHOSPHATASE: 100 UNIT/L (ref 40–115)
ALKALINE PHOSPHATASE: 105 UNIT/L (ref 40–115)
ALKALINE PHOSPHATASE: 106 UNIT/L (ref 40–115)
ALKALINE PHOSPHATASE: 109 UNIT/L (ref 40–115)
ALKALINE PHOSPHATASE: 109 UNIT/L (ref 40–115)
ALKALINE PHOSPHATASE: 131 UNIT/L (ref 40–115)
ALKALINE PHOSPHATASE: 140 UNIT/L (ref 40–115)
ALKALINE PHOSPHATASE: 164 UNIT/L (ref 40–115)
ALKALINE PHOSPHATASE: 66 UNIT/L (ref 40–115)
ALKALINE PHOSPHATASE: 66 UNIT/L (ref 40–115)
ALKALINE PHOSPHATASE: 70 UNIT/L (ref 40–115)
ALKALINE PHOSPHATASE: 74 UNIT/L (ref 40–115)
ALKALINE PHOSPHATASE: 75 UNIT/L (ref 40–115)
ALKALINE PHOSPHATASE: 82 UNIT/L (ref 40–115)
ALKALINE PHOSPHATASE: 83 UNIT/L
ALKALINE PHOSPHATASE: 90 UNIT/L (ref 40–115)
ALKALINE PHOSPHATASE: 95 UNIT/L (ref 40–115)
ALKALINE PHOSPHATASE: 95 UNIT/L (ref 40–115)
ALT: 10 UNIT/L (ref 9–46)
ALT: 11 UNIT/L (ref 9–46)
ALT: 12 UNIT/L (ref 9–46)
ALT: 23 UNIT/L (ref 9–46)
ALT: 24 UNIT/L (ref 9–46)
ALT: 36 UNIT/L
ALT: 6 UNIT/L (ref 9–46)
ALT: 7 UNIT/L (ref 9–46)
ALT: 7 UNIT/L (ref 9–46)
ALT: 8 UNIT/L (ref 9–46)
ALT: 9 UNIT/L (ref 9–46)
AST: 11 UNIT/L (ref 10–35)
AST: 12 UNIT/L (ref 10–35)
AST: 13 UNIT/L (ref 10–35)
AST: 14 UNIT/L (ref 10–35)
AST: 14 UNIT/L (ref 10–35)
AST: 15 UNIT/L (ref 10–35)
AST: 18 UNIT/L (ref 10–35)
AST: 19 UNIT/L (ref 10–35)
AST: 21 UNIT/L (ref 10–35)
AST: 36 UNIT/L
BASO%: 0.3 %
BASO%: 0.4 %
BASO%: 0.5 %
BASO%: 0.6 %
BASO%: 0.6 %
BASO%: 0.7 %
BASO: 0 10^3/UL
BASO: 0.03 10^3/UL
BASO: 0.04 10^3/UL
BASO: 0.1 10^3/UL
BILIRUBIN, DIRECT: 0.1 MG/DL
BILIRUBIN, INDIRECT: 0.2 MG/DL (CALC) (ref 0.2–1.2)
BILIRUBIN, TOTAL: 0.3 MG/DL
BILIRUBIN, TOTAL: 0.3 MG/DL (ref 0.2–1.2)
BILIRUBIN, TOTAL: 0.4 MG/DL (ref 0.2–1.2)
BILIRUBIN, TOTAL: 0.5 MG/DL (ref 0.2–1.2)
BUN/CREATININE RATIO: 14 (CALC) (ref 6–22)
BUN/CREATININE RATIO: 15 (CALC) (ref 6–22)
BUN/CREATININE RATIO: 16 (CALC) (ref 6–22)
BUN/CREATININE RATIO: 16 (CALC) (ref 6–22)
BUN/CREATININE RATIO: 17 (CALC) (ref 6–22)
BUN/CREATININE RATIO: 18 (CALC) (ref 6–22)
BUN/CREATININE RATIO: 19 (CALC) (ref 6–22)
BUN/CREATININE RATIO: 20 (CALC) (ref 6–22)
BUN/CREATININE RATIO: 20 (CALC) (ref 6–22)
BUN/CREATININE RATIO: 21 (CALC) (ref 6–22)
BUN/CREATININE RATIO: 22 (CALC) (ref 6–22)
BUN/CREATININE RATIO: 23 (CALC) (ref 6–22)
BUN/CREATININE RATIO: 24 (CALC) (ref 6–22)
BUN/CREATININE RATIO: 25 (CALC) (ref 6–22)
BUN/CREATININE RATIO: 25 (CALC) (ref 6–22)
BUN/CREATININE RATIO: 26 (CALC) (ref 6–22)
BUN/CREATININE RATIO: 27 (CALC) (ref 6–22)
CALCIUM: 10 MG/DL (ref 8.6–10.3)
CALCIUM: 10.1 MG/DL (ref 8.6–10.3)
CALCIUM: 10.2 MG/DL (ref 8.6–10.3)
CALCIUM: 10.5 MG/DL (ref 8.6–10.3)
CALCIUM: 10.9 MG/DL (ref 8.6–10.3)
CALCIUM: 11 MG/DL (ref 8.6–10.3)
CALCIUM: 11.4 MG/DL (ref 8.6–10.3)
CALCIUM: 7.8 MG/DL
CALCIUM: 8.8 MG/DL (ref 8.6–10.3)
CALCIUM: 8.9 MG/DL (ref 8.6–10.3)
CALCIUM: 9 MG/DL (ref 8.6–10.3)
CALCIUM: 9 MG/DL (ref 8.6–10.3)
CALCIUM: 9.1 MG/DL (ref 8.6–10.3)
CALCIUM: 9.2 MG/DL (ref 8.6–10.3)
CALCIUM: 9.2 MG/DL (ref 8.6–10.3)
CALCIUM: 9.3 MG/DL (ref 8.6–10.3)
CALCIUM: 9.4 MG/DL (ref 8.6–10.3)
CALCIUM: 9.4 MG/DL (ref 8.6–10.3)
CALCIUM: 9.5 MG/DL (ref 8.6–10.3)
CALCIUM: 9.5 MG/DL (ref 8.6–10.3)
CARBON DIOXIDE: 12 MMOL/L (ref 20–32)
CARBON DIOXIDE: 13 MMOL/L (ref 20–31)
CARBON DIOXIDE: 16 MMOL/L (ref 20–32)
CARBON DIOXIDE: 17 MMOL/L (ref 20–32)
CARBON DIOXIDE: 18 MMOL/L (ref 20–31)
CARBON DIOXIDE: 19 MMOL/L (ref 20–31)
CARBON DIOXIDE: 19 MMOL/L (ref 20–31)
CARBON DIOXIDE: 19 MMOL/L (ref 20–32)
CARBON DIOXIDE: 20 MMOL/L
CARBON DIOXIDE: 20 MMOL/L (ref 20–31)
CARBON DIOXIDE: 20 MMOL/L (ref 20–32)
CARBON DIOXIDE: 21 MMOL/L (ref 20–31)
CARBON DIOXIDE: 21 MMOL/L (ref 20–31)
CARBON DIOXIDE: 21 MMOL/L (ref 20–32)
CARBON DIOXIDE: 21 MMOL/L (ref 20–32)
CARBON DIOXIDE: 22 MMOL/L (ref 20–31)
CARBON DIOXIDE: 22 MMOL/L (ref 20–32)
CARBON DIOXIDE: 23 MMOL/L (ref 20–31)
CARBON DIOXIDE: 23 MMOL/L (ref 20–32)
CARBON DIOXIDE: 24 MMOL/L (ref 20–31)
CHLORIDE: 106 MMOL/L (ref 98–110)
CHLORIDE: 107 MMOL/L (ref 98–110)
CHLORIDE: 108 MMOL/L (ref 98–110)
CHLORIDE: 109 MMOL/L (ref 98–110)
CHLORIDE: 110 MMOL/L (ref 98–110)
CHLORIDE: 111 MMOL/L (ref 98–110)
CHLORIDE: 112 MMOL/L (ref 98–110)
CHLORIDE: 114 MMOL/L
CHLORIDE: 114 MMOL/L (ref 98–110)
CHLORIDE: 116 MMOL/L (ref 98–110)
CHLORIDE: 116 MMOL/L (ref 98–110)
CRCL (C&G) (MOSAIQ HL): 18.1 ML/MIN
CRCL (C&G) (MOSAIQ HL): 18.5 ML/MIN
CRCL (C&G) (MOSAIQ HL): 20.72 ML/MIN
CRCL (C&G) (MOSAIQ HL): 21.16 ML/MIN
CRCL (C&G) (MOSAIQ HL): 21.33 ML/MIN
CRCL (C&G) (MOSAIQ HL): 21.48 ML/MIN
CRCL (C&G) (MOSAIQ HL): 22.1 ML/MIN
CRCL (C&G) (MOSAIQ HL): 22.34 ML/MIN
CRCL (C&G) (MOSAIQ HL): 22.53 ML/MIN
CRCL (C&G) (MOSAIQ HL): 22.8 ML/MIN
CRCL (C&G) (MOSAIQ HL): 22.84 ML/MIN
CRCL (C&G) (MOSAIQ HL): 22.93 ML/MIN
CRCL (C&G) (MOSAIQ HL): 24.22 ML/MIN
CRCL (C&G) (MOSAIQ HL): 24.99 ML/MIN
CRCL (C&G) (MOSAIQ HL): 25.17 ML/MIN
CRCL (C&G) (MOSAIQ HL): 26.26 ML/MIN
CRCL (C&G) (MOSAIQ HL): 29.03 ML/MIN
CRCL (C&G) (MOSAIQ HL): 30.14 ML/MIN
CRCL (C&G) (MOSAIQ HL): 30.4 ML/MIN
CRCL (C&G) (MOSAIQ HL): 30.45 ML/MIN
CRCL (C&G) (MOSAIQ HL): 30.62 ML/MIN
CRCL (C&G) (MOSAIQ HL): 30.77 ML/MIN
CRCL (C&G) (MOSAIQ HL): 31.08 ML/MIN
CRCL (C&G) (MOSAIQ HL): 31.43 ML/MIN
CRCL (C&G) (MOSAIQ HL): 32.87 ML/MIN
CRCL (C&G) (MOSAIQ HL): 33.07 ML/MIN
CRCL (C&G) (MOSAIQ HL): 33.83 ML/MIN
CRCL (C&G) (MOSAIQ HL): 33.83 ML/MIN
CRCL (C&G) (MOSAIQ HL): 34.02 ML/MIN
CRCL (C&G) (MOSAIQ HL): 34.66 ML/MIN
CREATININE CLEARANCE (MOSAIQ HL): 14 ML/MIN
CREATININE CLEARANCE (MOSAIQ HL): 16.3 ML/MIN
CREATININE CLEARANCE (MOSAIQ HL): 16.9 ML/MIN
CREATININE CLEARANCE (MOSAIQ HL): 17.6 ML/MIN
CREATININE CLEARANCE (MOSAIQ HL): 18.6 ML/MIN
CREATININE CLEARANCE (MOSAIQ HL): 18.6 ML/MIN
CREATININE CLEARANCE (MOSAIQ HL): 18.7 ML/MIN
CREATININE CLEARANCE (MOSAIQ HL): 18.9 ML/MIN
CREATININE CLEARANCE (MOSAIQ HL): 19 ML/MIN
CREATININE CLEARANCE (MOSAIQ HL): 19.5 ML/MIN
CREATININE CLEARANCE (MOSAIQ HL): 19.6 ML/MIN
CREATININE CLEARANCE (MOSAIQ HL): 19.8 ML/MIN
CREATININE CLEARANCE (MOSAIQ HL): 20 ML/MIN
CREATININE CLEARANCE (MOSAIQ HL): 21.1 ML/MIN
CREATININE CLEARANCE (MOSAIQ HL): 21.7 ML/MIN
CREATININE CLEARANCE (MOSAIQ HL): 21.8 ML/MIN
CREATININE CLEARANCE (MOSAIQ HL): 22.6 ML/MIN
CREATININE CLEARANCE (MOSAIQ HL): 22.6 ML/MIN
CREATININE CLEARANCE (MOSAIQ HL): 22.8 ML/MIN
CREATININE CLEARANCE (MOSAIQ HL): 23 ML/MIN
CREATININE CLEARANCE (MOSAIQ HL): 23.3 ML/MIN
CREATININE CLEARANCE (MOSAIQ HL): 24.7 ML/MIN
CREATININE CLEARANCE (MOSAIQ HL): 25 ML/MIN
CREATININE CLEARANCE (MOSAIQ HL): 25.2 ML/MIN
CREATININE CLEARANCE (MOSAIQ HL): 25.2 ML/MIN
CREATININE CLEARANCE (MOSAIQ HL): 25.9 ML/MIN
CREATININE CLEARANCE (MOSAIQ HL): 25.9 ML/MIN
CREATININE: 1.62 MG/DL (ref 0.7–1.11)
CREATININE: 1.65 MG/DL (ref 0.7–1.11)
CREATININE: 1.7 MG/DL (ref 0.7–1.11)
CREATININE: 1.7 MG/DL (ref 0.7–1.11)
CREATININE: 1.71 MG/DL (ref 0.7–1.11)
CREATININE: 1.73 MG/DL (ref 0.7–1.11)
CREATININE: 1.8 MG/DL (ref 0.7–1.11)
CREATININE: 1.8 MG/DL (ref 0.7–1.11)
CREATININE: 1.81 MG/DL (ref 0.7–1.11)
CREATININE: 1.84 MG/DL (ref 0.7–1.11)
CREATININE: 1.84 MG/DL (ref 0.7–1.11)
CREATININE: 1.86 MG/DL (ref 0.7–1.11)
CREATININE: 1.86 MG/DL (ref 0.7–1.11)
CREATININE: 1.89 MG/DL (ref 0.7–1.11)
CREATININE: 1.89 MG/DL (ref 0.7–1.11)
CREATININE: 1.95 MG/DL (ref 0.7–1.11)
CREATININE: 1.97 MG/DL (ref 0.7–1.11)
CREATININE: 2.06 MG/DL (ref 0.7–1.11)
CREATININE: 2.1 MG/DL (ref 0.7–1.11)
CREATININE: 2.11 MG/DL (ref 0.7–1.11)
CREATININE: 2.12 MG/DL
CREATININE: 2.17 MG/DL (ref 0.7–1.11)
CREATININE: 2.2 MG/DL (ref 0.7–1.11)
CREATININE: 2.21 MG/DL (ref 0.7–1.11)
CREATININE: 2.22 MG/DL (ref 0.7–1.11)
CREATININE: 2.22 MG/DL (ref 0.7–1.11)
CREATININE: 2.39 MG/DL (ref 0.7–1.11)
CREATININE: 2.48 MG/DL (ref 0.7–1.11)
CREATININE: 2.52 MG/DL (ref 0.7–1.11)
CREATININE: 3.01 MG/DL (ref 0.7–1.11)
EGFR AFRICAN AMERICAN: 20 ML/MIN/1.73M2
EGFR AFRICAN AMERICAN: 25 ML/MIN/1.73M2
EGFR AFRICAN AMERICAN: 25 ML/MIN/1.73M2
EGFR AFRICAN AMERICAN: 27 ML/MIN/1.73M2
EGFR AFRICAN AMERICAN: 29 ML/MIN/1.73M2
EGFR AFRICAN AMERICAN: 30 ML/MIN/1.73M2
EGFR AFRICAN AMERICAN: 31 ML/MIN/1.73M2
EGFR AFRICAN AMERICAN: 32 ML/MIN/1.73M2
EGFR AFRICAN AMERICAN: 34 ML/MIN/1.73M2
EGFR AFRICAN AMERICAN: 34 ML/MIN/1.73M2
EGFR AFRICAN AMERICAN: 35 ML/MIN/1.73M2
EGFR AFRICAN AMERICAN: 36 ML/MIN/1.73M2
EGFR AFRICAN AMERICAN: 37 ML/MIN/1.73M2
EGFR AFRICAN AMERICAN: 38 ML/MIN/1.73M2
EGFR AFRICAN AMERICAN: 38 ML/MIN/1.73M2
EGFR AFRICAN AMERICAN: 40 ML/MIN/1.73M2
EGFR AFRICAN AMERICAN: 40 ML/MIN/1.73M2
EGFR AFRICAN AMERICAN: 41 ML/MIN/1.73M2
EGFR AFRICAN AMERICAN: 41 ML/MIN/1.73M2
EGFR AFRICAN AMERICAN: 42 ML/MIN/1.73M2
EGFR AFRICAN AMERICAN: 43 ML/MIN/1.73M2
EGFR NON-AFR. AMERICAN: 17 ML/MIN/1.73M2
EGFR NON-AFR. AMERICAN: 21 ML/MIN/1.73M2
EGFR NON-AFR. AMERICAN: 22 ML/MIN/1.73M2
EGFR NON-AFR. AMERICAN: 23 ML/MIN/1.73M2
EGFR NON-AFR. AMERICAN: 25 ML/MIN/1.73M2
EGFR NON-AFR. AMERICAN: 26 ML/MIN/1.73M2
EGFR NON-AFR. AMERICAN: 27 ML/MIN/1.73M2
EGFR NON-AFR. AMERICAN: 29 ML/MIN/1.73M2
EGFR NON-AFR. AMERICAN: 29 ML/MIN/1.73M2
EGFR NON-AFR. AMERICAN: 30 ML/MIN/1.73M2
EGFR NON-AFR. AMERICAN: 31 ML/MIN/1.73M2
EGFR NON-AFR. AMERICAN: 32 ML/MIN/1.73M2
EGFR NON-AFR. AMERICAN: 34 ML/MIN/1.73M2
EGFR NON-AFR. AMERICAN: 35 ML/MIN/1.73M2
EGFR NON-AFR. AMERICAN: 36 ML/MIN/1.73M2
EGFR NON-AFR. AMERICAN: 37 ML/MIN/1.73M2
EOS%: 1.4 %
EOS%: 1.8 %
EOS%: 2.2 %
EOS%: 3 %
EOS%: 3.2 %
EOS%: 3.3 %
EOS%: 3.4 %
EOS%: 3.5 %
EOS%: 3.6 %
EOS%: 3.8 %
EOS%: 3.9 %
EOS%: 4 %
EOS%: 4 %
EOS%: 4.1 %
EOS%: 4.1 %
EOS%: 4.2 %
EOS%: 4.4 %
EOS%: 4.6 %
EOS%: 4.6 %
EOS%: 4.9 %
EOS%: 4.9 %
EOS%: 5 %
EOS%: 5.1 %
EOS%: 5.1 %
EOS%: 5.4 %
EOS%: 5.8 %
EOS%: 6.4 %
EOS: 0.12 10^3/UL
EOS: 0.2 10^3/UL
EOS: 0.2 10^3/UL
EOS: 0.3 10^3/UL
EOS: 0.32 10^3/UL
EOS: 0.4 10^3/UL
EOS: 0.5 10^3/UL
FOLATE, SERUM: 21.1 NG/ML
FOLATE, SERUM: >24 NG/ML
FREE TESTOSTERONE: 0.4 PG/ML
GLOBULIN: 2.4 G/DL (CALC) (ref 1.9–3.7)
GLOBULIN: 2.6 G/DL (CALC) (ref 1.9–3.7)
GLOBULIN: 2.6 G/DL (CALC) (ref 1.9–3.7)
GLOBULIN: 2.7 G/DL (CALC) (ref 1.9–3.7)
GLOBULIN: 2.7 G/DL (CALC) (ref 1.9–3.7)
GLOBULIN: 2.8 G/DL (CALC) (ref 1.9–3.7)
GLOBULIN: 2.8 G/DL (CALC) (ref 1.9–3.7)
GLOBULIN: 3 G/DL (CALC) (ref 1.9–3.7)
GLOBULIN: 3.1 G/DL (CALC) (ref 1.9–3.7)
GLOBULIN: 3.1 G/DL (CALC) (ref 1.9–3.7)
GLOBULIN: 3.2 G/DL (CALC) (ref 1.9–3.7)
GLOBULIN: 3.2 G/DL (CALC) (ref 1.9–3.7)
GLOBULIN: 3.9 G/DL (CALC)
GLUCOSE: 100 MG/DL (ref 65–99)
GLUCOSE: 101 MG/DL (ref 65–99)
GLUCOSE: 102 MG/DL (ref 65–99)
GLUCOSE: 102 MG/DL (ref 65–99)
GLUCOSE: 103 MG/DL (ref 65–99)
GLUCOSE: 110 MG/DL (ref 65–99)
GLUCOSE: 87 MG/DL (ref 65–99)
GLUCOSE: 87 MG/DL (ref 65–99)
GLUCOSE: 88 MG/DL (ref 65–99)
GLUCOSE: 89 MG/DL
GLUCOSE: 89 MG/DL (ref 65–99)
GLUCOSE: 91 MG/DL (ref 65–99)
GLUCOSE: 92 MG/DL (ref 65–99)
GLUCOSE: 93 MG/DL (ref 65–99)
GLUCOSE: 95 MG/DL (ref 65–99)
GLUCOSE: 95 MG/DL (ref 65–99)
GLUCOSE: 96 MG/DL (ref 65–99)
GLUCOSE: 97 MG/DL (ref 65–99)
GLUCOSE: 99 MG/DL (ref 65–99)
HCT: 30.3 % (ref 38–54)
HCT: 30.4 % (ref 38–54)
HCT: 31.2 % (ref 38–54)
HCT: 31.9 % (ref 38–54)
HCT: 31.9 % (ref 38–54)
HCT: 32 % (ref 38–54)
HCT: 32.2 % (ref 38–54)
HCT: 32.4 %
HCT: 33.1 % (ref 38–54)
HCT: 33.1 % (ref 38–54)
HCT: 33.5 % (ref 38–54)
HCT: 33.8 % (ref 38–54)
HCT: 33.9 % (ref 38–54)
HCT: 33.9 % (ref 38–54)
HCT: 34 % (ref 38–54)
HCT: 34 % (ref 38–54)
HCT: 34.2 % (ref 38–54)
HCT: 34.3 % (ref 38–54)
HCT: 34.4 % (ref 38–54)
HCT: 34.4 % (ref 38–54)
HCT: 34.6 % (ref 38–54)
HCT: 34.6 % (ref 38–54)
HCT: 34.9 % (ref 38–54)
HCT: 35 % (ref 38–54)
HCT: 35.1 % (ref 38–54)
HCT: 35.2 %
HCT: 35.2 % (ref 38–54)
HCT: 35.3 % (ref 38–54)
HCT: 36.3 % (ref 38–54)
HCT: 36.4 % (ref 38–54)
HCT: 37.1 % (ref 38–54)
HGB: 10.1 G/DL (ref 12–18)
HGB: 10.4 G/DL (ref 12–18)
HGB: 10.4 G/DL (ref 12–18)
HGB: 10.5 G/DL (ref 12–18)
HGB: 10.7 G/DL (ref 12–18)
HGB: 10.7 G/DL (ref 12–18)
HGB: 10.8 G/DL (ref 12–18)
HGB: 10.9 G/DL (ref 12–18)
HGB: 11 G/DL (ref 12–18)
HGB: 11 G/DL (ref 12–18)
HGB: 11.1 G/DL (ref 12–18)
HGB: 11.1 G/DL (ref 12–18)
HGB: 11.2 G/DL (ref 12–18)
HGB: 11.3 G/DL
HGB: 11.3 G/DL (ref 12–18)
HGB: 11.5 G/DL (ref 12–18)
HGB: 11.7 G/DL (ref 12–18)
HGB: 11.8 G/DL (ref 12–18)
HGB: 11.9 G/DL (ref 12–18)
HGB: 12 G/DL (ref 12–18)
HGB: 9.6 G/DL (ref 12–18)
HGB: 9.7 G/DL
HGB: 9.9 G/DL (ref 12–18)
IRON BINDING CAPACITY: 217 MCG/DL (CALC) (ref 250–425)
IRON BINDING CAPACITY: 227 MCG/DL (CALC) (ref 250–425)
IRON BINDING CAPACITY: 236 MCG/DL (CALC) (ref 250–425)
IRON BINDING CAPACITY: 258 MCG/DL (CALC) (ref 250–425)
IRON BINDING CAPACITY: 259 MCG/DL (CALC) (ref 250–425)
IRON BINDING CAPACITY: 260 MCG/DL (CALC) (ref 250–425)
IRON BINDING CAPACITY: 261 MCG/DL (CALC) (ref 250–425)
IRON BINDING CAPACITY: 289 MCG/DL (CALC) (ref 250–425)
IRON, TOTAL: 38 MCG/DL (ref 50–180)
IRON, TOTAL: 55 MCG/DL (ref 50–180)
IRON, TOTAL: 58 MCG/DL (ref 50–180)
IRON, TOTAL: 62 MCG/DL (ref 50–180)
IRON, TOTAL: 63 MCG/DL (ref 50–180)
IRON, TOTAL: 72 MCG/DL (ref 50–180)
IRON, TOTAL: 84 MCG/DL (ref 50–180)
IRON, TOTAL: 98 MCG/DL (ref 50–180)
LYMPH%: 15.4 % (ref 12–44)
LYMPH%: 16.7 % (ref 12–44)
LYMPH%: 18.5 % (ref 12–44)
LYMPH%: 18.6 % (ref 12–44)
LYMPH%: 18.6 % (ref 12–44)
LYMPH%: 19.2 % (ref 12–44)
LYMPH%: 19.8 % (ref 12–44)
LYMPH%: 19.9 % (ref 12–44)
LYMPH%: 20.2 % (ref 12–44)
LYMPH%: 20.3 % (ref 12–44)
LYMPH%: 20.4 %
LYMPH%: 20.8 %
LYMPH%: 21.3 % (ref 12–44)
LYMPH%: 21.7 % (ref 12–44)
LYMPH%: 21.8 % (ref 12–44)
LYMPH%: 22.1 % (ref 12–44)
LYMPH%: 22.4 % (ref 12–44)
LYMPH%: 22.9 % (ref 12–44)
LYMPH%: 23.2 % (ref 12–44)
LYMPH%: 24.5 % (ref 12–44)
LYMPH%: 24.7 % (ref 12–44)
LYMPH%: 24.9 % (ref 12–44)
LYMPH%: 25.1 % (ref 12–44)
LYMPH%: 25.2 % (ref 12–44)
LYMPH%: 25.9 % (ref 12–44)
LYMPH%: 26 % (ref 12–44)
LYMPH%: 26 % (ref 12–44)
LYMPH%: 26.4 % (ref 12–44)
LYMPH%: 27.5 % (ref 12–44)
LYMPH: 1.3 10^3/UL (ref 0.8–2.8)
LYMPH: 1.4 10^3/UL (ref 0.8–2.8)
LYMPH: 1.5 10^3/UL (ref 0.8–2.8)
LYMPH: 1.5 10^3/UL (ref 0.8–2.8)
LYMPH: 1.6 10^3/UL (ref 0.8–2.8)
LYMPH: 1.61 10^3/UL
LYMPH: 1.7 10^3/UL (ref 0.8–2.8)
LYMPH: 1.74 10^3/UL
LYMPH: 1.8 10^3/UL (ref 0.8–2.8)
LYMPH: 1.9 10^3/UL (ref 0.8–2.8)
LYMPH: 2 10^3/UL (ref 0.8–2.8)
LYMPH: 2.1 10^3/UL (ref 0.8–2.8)
LYMPH: 2.1 10^3/UL (ref 0.8–2.8)
LYMPH: 2.2 10^3/UL (ref 0.8–2.8)
LYMPH: 2.4 10^3/UL (ref 0.8–2.8)
Lab: 5 NG/DL (ref 250–1100)
MCH: 28.4 PG (ref 26–33)
MCH: 28.8 PG (ref 26–33)
MCH: 28.8 PG (ref 26–33)
MCH: 29.1 PG (ref 26–33)
MCH: 29.2 PG (ref 26–33)
MCH: 29.3 PG (ref 26–33)
MCH: 29.5 PG (ref 26–33)
MCH: 29.6 PG (ref 26–33)
MCH: 29.6 PG (ref 26–33)
MCH: 29.7 PG
MCH: 29.7 PG (ref 26–33)
MCH: 29.8 PG (ref 26–33)
MCH: 29.9 PG
MCH: 29.9 PG (ref 26–33)
MCH: 30 PG (ref 26–33)
MCH: 30.4 PG (ref 26–33)
MCH: 30.4 PG (ref 26–33)
MCH: 30.6 PG (ref 26–33)
MCH: 30.8 PG (ref 26–33)
MCH: 30.8 PG (ref 26–33)
MCH: 31.1 PG (ref 26–33)
MCH: 31.4 PG (ref 26–33)
MCH: 31.9 PG (ref 26–33)
MCH: 32.2 PG (ref 26–33)
MCH: 32.5 PG (ref 26–33)
MCHC: 29.9 G/DL
MCHC: 30.9 G/DL (ref 31–36)
MCHC: 31 G/DL (ref 31–36)
MCHC: 31.7 G/DL (ref 31–36)
MCHC: 31.8 G/DL (ref 31–36)
MCHC: 31.9 G/DL (ref 31–36)
MCHC: 32.1 G/DL
MCHC: 32.1 G/DL (ref 31–36)
MCHC: 32.2 G/DL (ref 31–36)
MCHC: 32.3 G/DL (ref 31–36)
MCHC: 32.3 G/DL (ref 31–36)
MCHC: 32.4 G/DL (ref 31–36)
MCHC: 32.6 G/DL (ref 31–36)
MCHC: 32.7 G/DL (ref 31–36)
MCHC: 32.7 G/DL (ref 31–36)
MCHC: 32.8 G/DL (ref 31–36)
MCHC: 32.9 G/DL (ref 31–36)
MCHC: 33 G/DL (ref 31–36)
MCHC: 33.2 G/DL (ref 31–36)
MCHC: 33.2 G/DL (ref 31–36)
MCHC: 33.3 G/DL (ref 31–36)
MCHC: 33.4 G/DL (ref 31–36)
MCHC: 33.4 G/DL (ref 31–36)
MCV: 89.5 FML (ref 82–100)
MCV: 89.5 FML (ref 82–100)
MCV: 89.7 FML (ref 82–100)
MCV: 89.9 FML (ref 82–100)
MCV: 90.1 FML (ref 82–100)
MCV: 90.2 FML (ref 82–100)
MCV: 90.5 FML (ref 82–100)
MCV: 90.7 FML (ref 82–100)
MCV: 90.7 FML (ref 82–100)
MCV: 91.1 FML (ref 82–100)
MCV: 91.2 FML (ref 82–100)
MCV: 91.6 FML (ref 82–100)
MCV: 91.7 FML (ref 82–100)
MCV: 91.9 FML (ref 82–100)
MCV: 92 FML (ref 82–100)
MCV: 92 FML (ref 82–100)
MCV: 92.1 FML (ref 82–100)
MCV: 93 FML (ref 82–100)
MCV: 93.1 FML
MCV: 93.3 FML (ref 82–100)
MCV: 94.1 FML (ref 82–100)
MCV: 94.2 FML (ref 82–100)
MCV: 94.2 FML (ref 82–100)
MCV: 94.5 FML (ref 82–100)
MCV: 95.6 FML (ref 82–100)
MCV: 96 FML (ref 82–100)
MCV: 96.8 FML (ref 82–100)
MCV: 97.1 FML (ref 82–100)
MCV: 97.3 FML (ref 82–100)
MCV: 98.8 FML (ref 82–100)
MCV: 99.1 FML
MONO%: 4.4 %
MONO%: 5.1 % (ref 2–12)
MONO%: 5.1 % (ref 2–12)
MONO%: 5.2 % (ref 2–12)
MONO%: 5.4 % (ref 2–12)
MONO%: 5.5 % (ref 2–12)
MONO%: 5.5 % (ref 2–12)
MONO%: 5.6 % (ref 2–12)
MONO%: 5.7 % (ref 2–12)
MONO%: 5.8 % (ref 2–12)
MONO%: 5.9 % (ref 2–12)
MONO%: 6 % (ref 2–12)
MONO%: 6.1 %
MONO%: 6.1 % (ref 2–12)
MONO%: 6.2 % (ref 2–12)
MONO%: 6.4 % (ref 2–12)
MONO%: 6.5 % (ref 2–12)
MONO%: 6.5 % (ref 2–12)
MONO%: 6.8 % (ref 2–12)
MONO%: 7 % (ref 2–12)
MONO%: 7.1 % (ref 2–12)
MONO%: 7.3 % (ref 2–12)
MONO%: 7.3 % (ref 2–12)
MONO%: 8 % (ref 2–12)
MONO%: 8.9 % (ref 2–12)
MONO: 0.37 10^3/UL
MONO: 0.4 10^3/UL (ref 0.2–1)
MONO: 0.48 10^3/UL
MONO: 0.5 10^3/UL (ref 0.2–1)
MONO: 0.6 10^3/UL (ref 0.2–1)
MONO: 0.8 10^3/UL (ref 0.2–1)
MONO: 0.8 10^3/UL (ref 0.2–1)
MPV: 7.7 FML (ref 8.6–11.7)
MPV: 7.9 FML (ref 8.6–11.7)
MPV: 8 FML (ref 8.6–11.7)
MPV: 8.1 FML (ref 8.6–11.7)
MPV: 8.1 FML (ref 8.6–11.7)
MPV: 8.2 FML (ref 8.6–11.7)
MPV: 8.3 FML
MPV: 8.3 FML (ref 8.6–11.7)
MPV: 8.4 FML (ref 8.6–11.7)
MPV: 8.4 FML (ref 8.6–11.7)
MPV: 8.5 FML (ref 8.6–11.7)
MPV: 8.7 FML (ref 8.6–11.7)
MPV: 8.8 FML (ref 8.6–11.7)
MPV: 8.8 FML (ref 8.6–11.7)
MPV: 8.9 FML (ref 8.6–11.7)
MPV: 9 FML (ref 8.6–11.7)
MPV: 9.1 FML (ref 8.6–11.7)
NEUT%: 58.7 % (ref 47–76)
NEUT%: 61.2 % (ref 47–76)
NEUT%: 61.4 % (ref 47–76)
NEUT%: 61.5 % (ref 47–76)
NEUT%: 62.1 % (ref 47–76)
NEUT%: 62.7 % (ref 47–76)
NEUT%: 63.6 % (ref 47–76)
NEUT%: 63.9 % (ref 47–76)
NEUT%: 64.2 % (ref 47–76)
NEUT%: 64.5 % (ref 47–76)
NEUT%: 64.6 % (ref 47–76)
NEUT%: 64.7 % (ref 47–76)
NEUT%: 65.3 % (ref 47–76)
NEUT%: 65.7 % (ref 47–76)
NEUT%: 66.4 % (ref 47–76)
NEUT%: 67 % (ref 47–76)
NEUT%: 67 % (ref 47–76)
NEUT%: 68.9 % (ref 47–76)
NEUT%: 69 %
NEUT%: 69.3 % (ref 47–76)
NEUT%: 69.6 % (ref 47–76)
NEUT%: 69.9 % (ref 47–76)
NEUT%: 69.9 % (ref 47–76)
NEUT%: 70.8 % (ref 47–76)
NEUT%: 71 %
NEUT%: 72.5 % (ref 47–76)
NEUT%: 73 % (ref 47–76)
NEUT%: 74.1 % (ref 47–76)
NEUT%: 74.3 % (ref 47–76)
NEUT: 3.5 10^3/UL (ref 1.5–7.1)
NEUT: 4.3 10^3/UL (ref 1.5–7.1)
NEUT: 4.3 10^3/UL (ref 1.5–7.1)
NEUT: 4.5 10^3/UL (ref 1.5–7.1)
NEUT: 4.6 10^3/UL (ref 1.5–7.1)
NEUT: 4.6 10^3/UL (ref 1.5–7.1)
NEUT: 4.8 10^3/UL (ref 1.5–7.1)
NEUT: 5 10^3/UL (ref 1.5–7.1)
NEUT: 5.1 10^3/UL (ref 1.5–7.1)
NEUT: 5.2 10^3/UL (ref 1.5–7.1)
NEUT: 5.2 10^3/UL (ref 1.5–7.1)
NEUT: 5.3 10^3/UL (ref 1.5–7.1)
NEUT: 5.3 10^3/UL (ref 1.5–7.1)
NEUT: 5.4 10^3/UL (ref 1.5–7.1)
NEUT: 5.4 10^3/UL (ref 1.5–7.1)
NEUT: 5.45 10^3/UL
NEUT: 5.5 10^3/UL (ref 1.5–7.1)
NEUT: 5.6 10^3/UL (ref 1.5–7.1)
NEUT: 5.6 10^3/UL (ref 1.5–7.1)
NEUT: 5.7 10^3/UL (ref 1.5–7.1)
NEUT: 5.9 10^3/UL (ref 1.5–7.1)
NEUT: 5964 10^3/UL
NEUT: 6 10^3/UL (ref 1.5–7.1)
NEUT: 6 10^3/UL (ref 1.5–7.1)
NEUT: 6.3 10^3/UL (ref 1.5–7.1)
NEUT: 6.3 10^3/UL (ref 1.5–7.1)
NEUT: 6.8 10^3/UL (ref 1.5–7.1)
NEUT: 7.6 10^3/UL (ref 1.5–7.1)
NEUT: 7.9 10^3/UL (ref 1.5–7.1)
PLT: 199 10^3/UL (ref 150–375)
PLT: 202 10^3/UL (ref 150–375)
PLT: 207 10^3/UL (ref 150–375)
PLT: 209 10^3/UL (ref 150–375)
PLT: 209 10^3/UL (ref 150–375)
PLT: 211 10^3/UL (ref 150–375)
PLT: 212 10^3/UL (ref 150–375)
PLT: 213 10^3/UL (ref 150–375)
PLT: 214 10^3/UL (ref 150–375)
PLT: 214 10^3/UL (ref 150–375)
PLT: 222 10^3/UL (ref 150–375)
PLT: 226 10^3/UL (ref 150–375)
PLT: 230 10^3/UL (ref 150–375)
PLT: 231 10^3/UL (ref 150–375)
PLT: 232 10^3/UL (ref 150–375)
PLT: 233 10^3/UL
PLT: 236 10^3/UL (ref 150–375)
PLT: 237 10^3/UL (ref 150–375)
PLT: 238 10^3/UL (ref 150–375)
PLT: 239 10^3/UL (ref 150–375)
PLT: 243 10^3/UL (ref 150–375)
PLT: 246 10^3/UL (ref 150–375)
PLT: 246 10^3/UL (ref 150–375)
PLT: 247 10^3/UL (ref 150–375)
PLT: 251 10^3/UL (ref 150–375)
PLT: 257 10^3/UL
PLT: 260 10^3/UL (ref 150–375)
PLT: 262 10^3/UL (ref 150–375)
PLT: 285 10^3/UL (ref 150–375)
POTASSIUM: 4 MMOL/L (ref 3.5–5.3)
POTASSIUM: 4.1 MMOL/L (ref 3.5–5.3)
POTASSIUM: 4.1 MMOL/L (ref 3.5–5.3)
POTASSIUM: 4.3 MMOL/L (ref 3.5–5.3)
POTASSIUM: 4.4 MMOL/L (ref 3.5–5.3)
POTASSIUM: 4.5 MMOL/L (ref 3.5–5.3)
POTASSIUM: 4.5 MMOL/L (ref 3.5–5.3)
POTASSIUM: 4.6 MMOL/L
POTASSIUM: 4.6 MMOL/L (ref 3.5–5.3)
POTASSIUM: 4.7 MMOL/L (ref 3.5–5.3)
POTASSIUM: 4.8 MMOL/L (ref 3.5–5.3)
POTASSIUM: 4.8 MMOL/L (ref 3.5–5.3)
POTASSIUM: 4.9 MMOL/L (ref 3.5–5.3)
POTASSIUM: 5 MMOL/L (ref 3.5–5.3)
POTASSIUM: 5.2 MMOL/L (ref 3.5–5.3)
POTASSIUM: 5.3 MMOL/L (ref 3.5–5.3)
POTASSIUM: 5.4 MMOL/L (ref 3.5–5.3)
PROTEIN, TOTAL: 6.3 G/DL (ref 6.1–8.1)
PROTEIN, TOTAL: 6.4 G/DL (ref 6.1–8.1)
PROTEIN, TOTAL: 6.5 G/DL (ref 6.1–8.1)
PROTEIN, TOTAL: 6.6 G/DL
PROTEIN, TOTAL: 6.6 G/DL (ref 6.1–8.1)
PROTEIN, TOTAL: 6.7 G/DL (ref 6.1–8.1)
PROTEIN, TOTAL: 6.7 G/DL (ref 6.1–8.1)
PROTEIN, TOTAL: 6.8 G/DL (ref 6.1–8.1)
PROTEIN, TOTAL: 6.8 G/DL (ref 6.1–8.1)
PROTEIN, TOTAL: 6.9 G/DL (ref 6.1–8.1)
PROTEIN, TOTAL: 6.9 G/DL (ref 6.1–8.1)
PROTEIN, TOTAL: 7 G/DL (ref 6.1–8.1)
PROTEIN, TOTAL: 7.3 G/DL (ref 6.1–8.1)
PROTEIN, TOTAL: 7.4 G/DL (ref 6.1–8.1)
PSA, TOTAL: 12.2 NG/ML
PSA, TOTAL: 18.1 NG/ML
PSA, TOTAL: 19.1 NG/ML
PSA, TOTAL: 2.5 NG/ML
PSA, TOTAL: 4.6 NG/ML
PSA, TOTAL: 4.8 NG/ML
PSA, TOTAL: 4.9 NG/ML
PSA, TOTAL: 5.2 NG/ML
PSA, TOTAL: 5.6 NG/ML
PSA, TOTAL: 5.6 NG/ML
PSA, TOTAL: 5.9 NG/ML
PSA, TOTAL: 8 NG/ML
PSA, TOTAL: 9.4 NG/ML
RBC: 3.12 10^6/UL (ref 4.2–6.2)
RBC: 3.23 10^6/UL (ref 4.2–6.2)
RBC: 3.27 10^6/UL
RBC: 3.29 10^6/UL (ref 4.2–6.2)
RBC: 3.39 10^6/UL (ref 4.2–6.2)
RBC: 3.42 10^6/UL (ref 4.2–6.2)
RBC: 3.42 10^6/UL (ref 4.2–6.2)
RBC: 3.5 10^6/UL (ref 4.2–6.2)
RBC: 3.53 10^6/UL (ref 4.2–6.2)
RBC: 3.55 10^6/UL (ref 4.2–6.2)
RBC: 3.59 10^6/UL (ref 4.2–6.2)
RBC: 3.6 10^6/UL (ref 4.2–6.2)
RBC: 3.64 10^6/UL (ref 4.2–6.2)
RBC: 3.65 10^6/UL (ref 4.2–6.2)
RBC: 3.67 10^6/UL (ref 4.2–6.2)
RBC: 3.69 10^6/UL (ref 4.2–6.2)
RBC: 3.71 10^6/UL (ref 4.2–6.2)
RBC: 3.72 10^6/UL (ref 4.2–6.2)
RBC: 3.75 10^6/UL (ref 4.2–6.2)
RBC: 3.75 10^6/UL (ref 4.2–6.2)
RBC: 3.76 10^6/UL (ref 4.2–6.2)
RBC: 3.78 10^6/UL
RBC: 3.78 10^6/UL (ref 4.2–6.2)
RBC: 3.84 10^6/UL (ref 4.2–6.2)
RBC: 3.85 10^6/UL (ref 4.2–6.2)
RBC: 3.9 10^6/UL (ref 4.2–6.2)
RBC: 3.92 10^6/UL (ref 4.2–6.2)
RBC: 4.03 10^6/UL (ref 4.2–6.2)
RBC: 4.03 10^6/UL (ref 4.2–6.2)
RDW-CV: 12.9 %
RDW-CV: 13 %
RDW-CV: 13.1 %
RDW-CV: 13.3 %
RDW-CV: 13.3 %
RDW-CV: 13.7 %
RDW-CV: 13.7 %
RDW-CV: 13.8 %
RDW-CV: 13.9 %
RDW-CV: 14 %
RDW-CV: 14 %
RDW-CV: 14.1 %
RDW-CV: 14.1 %
RDW-CV: 14.2 %
RDW-CV: 14.2 %
RDW-CV: 14.4 %
RDW-CV: 14.6 %
RDW-CV: 14.7 %
RDW-CV: 15.3 %
RDW-CV: 15.7 %
RDW-CV: 16 %
RDW-CV: 16.2 %
RDW-SD: 43.4 FML (ref 36–50)
RDW-SD: 43.5 FML (ref 36–50)
RDW-SD: 43.9 FML (ref 36–50)
RDW-SD: 43.9 FML (ref 36–50)
RDW-SD: 44.2 FML (ref 36–50)
RDW-SD: 44.4 FML (ref 36–50)
RDW-SD: 44.5 FML (ref 36–50)
RDW-SD: 44.6 FML (ref 36–50)
RDW-SD: 44.6 FML (ref 36–50)
RDW-SD: 45.1 FML (ref 36–50)
RDW-SD: 45.4 FML (ref 36–50)
RDW-SD: 45.4 FML (ref 36–50)
RDW-SD: 45.5 FML (ref 36–50)
RDW-SD: 45.6 FML (ref 36–50)
RDW-SD: 45.6 FML (ref 36–50)
RDW-SD: 46.3 FML (ref 36–50)
RDW-SD: 46.3 FML (ref 36–50)
RDW-SD: 46.4 FML (ref 36–50)
RDW-SD: 46.9 FML (ref 36–50)
RDW-SD: 47 FML (ref 36–50)
RDW-SD: 49.9 FML (ref 36–50)
RDW-SD: 50.8 FML (ref 36–50)
RDW-SD: 51.5 FML (ref 36–50)
RDW-SD: 51.9 FML (ref 36–50)
RDW-SD: 53 FML (ref 36–50)
RDW-SD: 53 FML (ref 36–50)
RDW-SD: 54 FML (ref 36–50)
RETICULOCYTE COUNT,$AUTOMATED: 1.1 %
RETICULOCYTE COUNT,$AUTOMATED: 1.5 %
RETICULOCYTE, ABSOLUTE: NORMAL CELLS/UL (ref 25000–90000)
RETICULOCYTE, ABSOLUTE: NORMAL CELLS/UL (ref 25000–90000)
SODIUM: 139 MMOL/L (ref 135–146)
SODIUM: 140 MMOL/L (ref 135–146)
SODIUM: 141 MMOL/L (ref 135–146)
SODIUM: 142 MMOL/L (ref 135–146)
SODIUM: 143 MMOL/L
SODIUM: 143 MMOL/L (ref 135–146)
SODIUM: 144 MMOL/L (ref 135–146)
SODIUM: 144 MMOL/L (ref 135–146)
SODIUM: 145 MMOL/L (ref 135–146)
UREA NITROGEN (BUN): 27 MG/DL
UREA NITROGEN (BUN): 29 MG/DL (ref 7–25)
UREA NITROGEN (BUN): 30 MG/DL (ref 7–25)
UREA NITROGEN (BUN): 31 MG/DL (ref 7–25)
UREA NITROGEN (BUN): 32 MG/DL (ref 7–25)
UREA NITROGEN (BUN): 33 MG/DL (ref 7–25)
UREA NITROGEN (BUN): 33 MG/DL (ref 7–25)
UREA NITROGEN (BUN): 34 MG/DL (ref 7–25)
UREA NITROGEN (BUN): 34 MG/DL (ref 7–25)
UREA NITROGEN (BUN): 35 MG/DL (ref 7–25)
UREA NITROGEN (BUN): 37 MG/DL (ref 7–25)
UREA NITROGEN (BUN): 41 MG/DL (ref 7–25)
UREA NITROGEN (BUN): 41 MG/DL (ref 7–25)
UREA NITROGEN (BUN): 43 MG/DL (ref 7–25)
UREA NITROGEN (BUN): 45 MG/DL (ref 7–25)
UREA NITROGEN (BUN): 46 MG/DL (ref 7–25)
UREA NITROGEN (BUN): 48 MG/DL (ref 7–25)
UREA NITROGEN (BUN): 49 MG/DL (ref 7–25)
UREA NITROGEN (BUN): 50 MG/DL (ref 7–25)
UREA NITROGEN (BUN): 50 MG/DL (ref 7–25)
UREA NITROGEN (BUN): 51 MG/DL (ref 7–25)
UREA NITROGEN (BUN): 54 MG/DL (ref 7–25)
UREA NITROGEN (BUN): 55 MG/DL (ref 7–25)
UREA NITROGEN (BUN): 55 MG/DL (ref 7–25)
VITAMIN B12: 312 PG/ML (ref 200–1100)
VITAMIN B12: 414 PG/ML (ref 200–1100)
WBC: 10.6 10^3/UL (ref 4.3–11)
WBC: 10.7 10^3/UL (ref 4.3–11)
WBC: 6 10^3/UL (ref 4.3–11)
WBC: 6.7 10^3/UL (ref 4.3–11)
WBC: 6.9 10^3/UL (ref 4.3–11)
WBC: 6.9 10^3/UL (ref 4.3–11)
WBC: 7 10^3/UL (ref 4.3–11)
WBC: 7.3 10^3/UL (ref 4.3–11)
WBC: 7.4 10^3/UL (ref 4.3–11)
WBC: 7.4 10^3/UL (ref 4.3–11)
WBC: 7.5 10^3/UL (ref 4.3–11)
WBC: 7.6 10^3/UL (ref 4.3–11)
WBC: 7.7 10^3/UL (ref 4.3–11)
WBC: 7.89 10^3/UL
WBC: 7.9 10^3/UL (ref 4.3–11)
WBC: 8 10^3/UL (ref 4.3–11)
WBC: 8.1 10^3/UL (ref 4.3–11)
WBC: 8.3 10^3/UL (ref 4.3–11)
WBC: 8.3 10^3/UL (ref 4.3–11)
WBC: 8.4 10^3/UL
WBC: 8.4 10^3/UL (ref 4.3–11)
WBC: 8.6 10^3/UL (ref 4.3–11)
WBC: 8.7 10^3/UL (ref 4.3–11)
WBC: 9.3 10^3/UL (ref 4.3–11)

## 2020-10-11 VITALS
SYSTOLIC BLOOD PRESSURE: 120 MMHG | BODY MASS INDEX: 24.27 KG/M2 | DIASTOLIC BLOOD PRESSURE: 68 MMHG | WEIGHT: 174.01 LBS

## 2020-10-11 VITALS
SYSTOLIC BLOOD PRESSURE: 128 MMHG | DIASTOLIC BLOOD PRESSURE: 78 MMHG | WEIGHT: 174.01 LBS | BODY MASS INDEX: 24.27 KG/M2

## 2020-10-11 VITALS
BODY MASS INDEX: 20.78 KG/M2 | SYSTOLIC BLOOD PRESSURE: 125 MMHG | DIASTOLIC BLOOD PRESSURE: 78 MMHG | WEIGHT: 149.01 LBS

## 2020-10-11 VITALS
SYSTOLIC BLOOD PRESSURE: 122 MMHG | BODY MASS INDEX: 24.69 KG/M2 | DIASTOLIC BLOOD PRESSURE: 70 MMHG | WEIGHT: 177.01 LBS

## 2020-10-11 VITALS — WEIGHT: 181 LBS | DIASTOLIC BLOOD PRESSURE: 64 MMHG | SYSTOLIC BLOOD PRESSURE: 126 MMHG | BODY MASS INDEX: 25.24 KG/M2

## 2020-10-11 VITALS — SYSTOLIC BLOOD PRESSURE: 122 MMHG | BODY MASS INDEX: 24.41 KG/M2 | WEIGHT: 175 LBS | DIASTOLIC BLOOD PRESSURE: 70 MMHG

## 2020-10-11 VITALS
DIASTOLIC BLOOD PRESSURE: 70 MMHG | BODY MASS INDEX: 24.27 KG/M2 | SYSTOLIC BLOOD PRESSURE: 128 MMHG | WEIGHT: 174.01 LBS

## 2020-10-11 VITALS — BODY MASS INDEX: 24.83 KG/M2 | SYSTOLIC BLOOD PRESSURE: 120 MMHG | DIASTOLIC BLOOD PRESSURE: 74 MMHG | WEIGHT: 178 LBS

## 2020-10-11 VITALS — BODY MASS INDEX: 22.18 KG/M2 | DIASTOLIC BLOOD PRESSURE: 64 MMHG | SYSTOLIC BLOOD PRESSURE: 110 MMHG | WEIGHT: 159 LBS

## 2020-10-11 VITALS
SYSTOLIC BLOOD PRESSURE: 102 MMHG | WEIGHT: 153.99 LBS | HEIGHT: 70 IN | BODY MASS INDEX: 22.05 KG/M2 | DIASTOLIC BLOOD PRESSURE: 60 MMHG

## 2020-10-11 VITALS — SYSTOLIC BLOOD PRESSURE: 118 MMHG | WEIGHT: 178 LBS | DIASTOLIC BLOOD PRESSURE: 64 MMHG | BODY MASS INDEX: 24.83 KG/M2

## 2020-10-11 VITALS — DIASTOLIC BLOOD PRESSURE: 68 MMHG | WEIGHT: 153.99 LBS | BODY MASS INDEX: 21.48 KG/M2 | SYSTOLIC BLOOD PRESSURE: 96 MMHG

## 2020-10-11 VITALS
BODY MASS INDEX: 24.55 KG/M2 | DIASTOLIC BLOOD PRESSURE: 70 MMHG | SYSTOLIC BLOOD PRESSURE: 118 MMHG | WEIGHT: 175.99 LBS

## 2020-10-11 VITALS
WEIGHT: 180.01 LBS | BODY MASS INDEX: 25.77 KG/M2 | SYSTOLIC BLOOD PRESSURE: 120 MMHG | HEIGHT: 70 IN | DIASTOLIC BLOOD PRESSURE: 74 MMHG

## 2020-10-11 VITALS
DIASTOLIC BLOOD PRESSURE: 74 MMHG | BODY MASS INDEX: 25.05 KG/M2 | WEIGHT: 175 LBS | HEIGHT: 70 IN | SYSTOLIC BLOOD PRESSURE: 120 MMHG

## 2020-10-11 VITALS
BODY MASS INDEX: 24.27 KG/M2 | WEIGHT: 174.01 LBS | SYSTOLIC BLOOD PRESSURE: 122 MMHG | DIASTOLIC BLOOD PRESSURE: 72 MMHG

## 2020-10-11 VITALS — BODY MASS INDEX: 24.83 KG/M2 | SYSTOLIC BLOOD PRESSURE: 140 MMHG | DIASTOLIC BLOOD PRESSURE: 74 MMHG | WEIGHT: 178 LBS

## 2020-10-11 VITALS
BODY MASS INDEX: 21.06 KG/M2 | SYSTOLIC BLOOD PRESSURE: 111 MMHG | DIASTOLIC BLOOD PRESSURE: 72 MMHG | WEIGHT: 150.99 LBS

## 2020-10-11 VITALS — DIASTOLIC BLOOD PRESSURE: 68 MMHG | SYSTOLIC BLOOD PRESSURE: 122 MMHG | WEIGHT: 173 LBS | BODY MASS INDEX: 24.13 KG/M2

## 2020-10-11 VITALS — SYSTOLIC BLOOD PRESSURE: 93 MMHG | WEIGHT: 150.99 LBS | DIASTOLIC BLOOD PRESSURE: 65 MMHG | BODY MASS INDEX: 21.06 KG/M2

## 2020-10-11 VITALS — SYSTOLIC BLOOD PRESSURE: 132 MMHG | BODY MASS INDEX: 24.83 KG/M2 | DIASTOLIC BLOOD PRESSURE: 72 MMHG | WEIGHT: 178 LBS

## 2020-10-11 VITALS
SYSTOLIC BLOOD PRESSURE: 90 MMHG | HEART RATE: 74 BPM | BODY MASS INDEX: 21.2 KG/M2 | DIASTOLIC BLOOD PRESSURE: 58 MMHG | WEIGHT: 152.01 LBS

## 2020-10-11 VITALS — WEIGHT: 164 LBS | DIASTOLIC BLOOD PRESSURE: 62 MMHG | SYSTOLIC BLOOD PRESSURE: 84 MMHG | BODY MASS INDEX: 22.87 KG/M2

## 2020-10-11 VITALS
WEIGHT: 180.01 LBS | BODY MASS INDEX: 25.11 KG/M2 | SYSTOLIC BLOOD PRESSURE: 126 MMHG | DIASTOLIC BLOOD PRESSURE: 78 MMHG

## 2020-10-11 VITALS
BODY MASS INDEX: 24.55 KG/M2 | WEIGHT: 175.99 LBS | DIASTOLIC BLOOD PRESSURE: 70 MMHG | SYSTOLIC BLOOD PRESSURE: 120 MMHG

## 2020-10-11 VITALS — SYSTOLIC BLOOD PRESSURE: 92 MMHG | BODY MASS INDEX: 21.34 KG/M2 | WEIGHT: 153 LBS | DIASTOLIC BLOOD PRESSURE: 68 MMHG

## 2020-10-11 VITALS
WEIGHT: 163.01 LBS | DIASTOLIC BLOOD PRESSURE: 66 MMHG | BODY MASS INDEX: 22.74 KG/M2 | SYSTOLIC BLOOD PRESSURE: 102 MMHG

## 2020-10-11 VITALS
WEIGHT: 178.99 LBS | BODY MASS INDEX: 24.96 KG/M2 | SYSTOLIC BLOOD PRESSURE: 130 MMHG | DIASTOLIC BLOOD PRESSURE: 72 MMHG

## 2020-10-11 VITALS — DIASTOLIC BLOOD PRESSURE: 70 MMHG | BODY MASS INDEX: 21.2 KG/M2 | SYSTOLIC BLOOD PRESSURE: 90 MMHG | WEIGHT: 152.01 LBS

## 2020-10-11 VITALS — DIASTOLIC BLOOD PRESSURE: 56 MMHG | BODY MASS INDEX: 21.13 KG/M2 | SYSTOLIC BLOOD PRESSURE: 108 MMHG | WEIGHT: 151.5 LBS

## 2020-10-11 VITALS — BODY MASS INDEX: 22.46 KG/M2 | SYSTOLIC BLOOD PRESSURE: 100 MMHG | DIASTOLIC BLOOD PRESSURE: 60 MMHG | WEIGHT: 161 LBS

## 2020-10-11 VITALS — WEIGHT: 153 LBS | DIASTOLIC BLOOD PRESSURE: 72 MMHG | SYSTOLIC BLOOD PRESSURE: 113 MMHG | BODY MASS INDEX: 21.34 KG/M2

## 2022-04-03 NOTE — PROGRESS NOTES
· [facility] Card[facility] Progress Note     Subjective:  Up in chair, no chest pain but does have exertional dyspnea    Objective:  110/71  Afebrile  SR brief PAT  I/O - 1030   BUN/cr 42/3.21    Cardiac: S1 S2 regular  Lungs: decreased BS right/left  Abdom Suzi Mayers

## 2023-10-24 NOTE — DIETARY NOTE
NUTRITION INITIAL ASSESSMENT    Pt is at moderate nutrition risk. Pt does not meet malnutrition criteria.     NUTRITION DIAGNOSIS/PROBLEM:    Inadequate energy intake related to physiological causes as evidenced by reported poor intake x 1 mo & 5% wt loss i supplements to maximize  Food Allergies: No  Cultural/Ethnic/Religion Preferences Addresses: Yes    NUTRITION RELATED PHYSICAL FINDINGS:     1. Body Fat/Muscle Mass: BMI 26.7     2.  Fluid Accumulation: none noted    NUTRITION PRESCRIPTION:  Calories: 1680 Cognitively Impaired

## 2024-04-01 NOTE — PROGRESS NOTES
[facility]  Progress Note    [address] Patient Status:  Inpatient    10/3/1927 MRN [id number]   [facility] 2NE-A Attending Darrin Salcedo MD   Hosp Day # 6 PCP Glo Miyamoto MD SEVERINO     Subjective:  5 [facility]  is a(n) 91 yea Called pt - set up ashwini pro placement NV for 4/16, will have removal at next appt 4/30   06/22/18   0549  06/23/18   0542  06/24/18   0611   GLU  100*  104*  97   BUN  43*  42*  43*   CREATSERUM  3.29*  3.29*  3.21*  3.21*  3.07*  3.07*   GFRAA  18*  18*  19*  19*  20*  20*   GFRNAA  16*  16*  16*  16*  17*  17*   CA  7.0*  6.9*  7.0*   NA  14

## 2025-06-18 NOTE — PLAN OF CARE
CARDIOVASCULAR - ADULT    • Maintains optimal cardiac output and hemodynamic stability Progressing    • Absence of cardiac arrhythmias or at baseline Progressing          RESPIRATORY - ADULT    • Achieves optimal ventilation and oxygenation Progressing [Negative] : Heme/Lymph [FreeTextEntry7] : Status post open right inguinal hernia repair doing well.

## (undated) DEVICE — NITINOL WIRE STR 035

## (undated) DEVICE — OPEN-END FLEXI-TIP URETERAL CATHETER: Brand: FLEXI-TIP

## (undated) DEVICE — STERILE POLYISOPRENE POWDER-FREE SURGICAL GLOVES: Brand: PROTEXIS

## (undated) DEVICE — SOL  .9 3000ML

## (undated) DEVICE — KENDALL SCD EXPRESS SLEEVES, KNEE LENGTH, MEDIUM: Brand: KENDALL SCD

## (undated) DEVICE — Device

## (undated) DEVICE — NITINOL WIRE ANGLED 035

## (undated) DEVICE — SYRINGE 20CC LL TIP

## (undated) DEVICE — GLOVE SURG SENSICARE SZ 6-1/2

## (undated) DEVICE — CYSTO CDS-LF: Brand: MEDLINE INDUSTRIES, INC.

## (undated) DEVICE — BAG DRAIN INFECTION CNTRL 2000

## (undated) NOTE — LETTER
Date: 7/14/2018    Patient Name: Annemarie Jhonathansantosh          To Whom it may concern:    Lovely Knowles was discharged from [facility] with the following Heart Failure Orders that must be continued at home (and while at [place]).     1.  Daily Weights - A weight

## (undated) NOTE — LETTER
FAX TRANSMITTAL      DATE: 7/14/18     TO:  Yady Tillman RN AT On license of [facility]: [phone]     FROM: [facility] [place] [address]  [phone]    TOTAL PAGES INCLUDING COVER SHEET: 2             IMPORTANT:    THIS COMMUNICATION IS INTENDED ONLY FO

## (undated) NOTE — LETTER
[facility] [address], [address]  Consent for Procedure/Sedation    Date:     Time:       1.  I authorize the performance upon [employer] the following: LEFT PERCUTANEOUS NEPHROSTOMY TUBE INSERTION AND OR CHANGE    2. I Goyo Fisher to patient:    ________________________________    ___________________    Witness: _________________________      Date: ___________________    Printed: 10/10/2019Total face to face time was ***, more than 50% of the time was spent in counseling and/or coor

## (undated) NOTE — LETTER
[facility]  Ama Mohr 61 0[address] [facility], [address] [place] [address]    Consent for Operation    Date: __________________    Time: _______________    1.  I authorize the performance upon [employer] the following operation:    Procedure(s):  Cystourethroscopy, lef revealed by the pictures or by descriptive texts accompanying them. If the procedure has been videotaped, the surgeon will obtain the original videotape. The hospital will not be responsible for storage or maintenance of this tape.     6. For the purpose of THAT MY DOCTOR PROVIDED ME WITH THE ABOVE EXPLANATIONS, THAT ALL BLANKS OR STATEMENTS REQUIRING INSERTION OR COMPLETION WERE FILLED IN.     Signature of Patient:   ___________________________    When the patient is a minor or mentally incompetent to give co supplements, and pills I can buy without a prescription (including street drugs/illegal medications). Failure to inform my anesthesiologist about these medicines may increase my risk of anesthetic complications.   · If I am allergic to anything or have had Anesthesiologist Signature     Date   Time  I have discussed the procedure and information above with the patient (or patient’s representative) and answered their questions. The patient or their representative has agreed to have anesthesia services.     ___

## (undated) NOTE — LETTER
[facility] [address], [address]  Consent for Procedure/Sedation    Date:     Time:       1.  I authorize the performance upon [employer] the following: LEFT PERCUTANEOUS NEPHROSTOMY TUBE INSERTION AND OR CHANGE    2. I Sruthi Holley to patient:    ________________________________    ___________________    Witness: _________________________      Date: ___________________    Printed: [unfilled] Total face to face time was ***, more than 50% of the time was spent in counseling and/or coordin

## (undated) NOTE — IP AVS SNAPSHOT
Patient Demographics     Address Phone    [id number] [facility] [id number] [facility]  [phone] [facility]      Emergency Contact(s)     Name Relation Home Work Mobile    Keesha Henry Daughter [phone]      Allergie Last time this was given:  15 mg on 2/18/2017  8:15 PM   Commonly known as:  REMERON        Take 15 mg by mouth nightly. multiple vitamin Chew        Chew 1 tablet by mouth daily.                             Nitrofurantoin Monohyd [id number] finasteride (PROSCAR) tab 5 mg 02/19/17 0828 Given      [id number] gabapentin (NEURONTIN) cap 300 mg 02/18/17 1703 Given      [id number] gabapentin (NEURONTIN) cap 300 mg 02/18/17 2015 Given      [id number] gabapentin (NEURONTIN) cap 300 mg 02/19/1 Specimen Information:  Other from Nares      Mrsa Culture No MRSA Isolated     Narrative:      Note: This culture is a screen for Methicillin Resistant Staphylococcus aureus (MRSA)only and does not detect Methicillin Sensitive Staphylococcus aureus(MSSA). he did not get out of bed all day. Patient very tired and weak. Patient also not been eating or drinking well at all today. Patient's daughter also says that he has not taken his medications today as well. Was a reported fever of 101. No chills.   Darcy 5/27/16 Disp:  Rfl:    multiple vitamin Oral Chew Tab Chew 1 tablet by mouth daily. Disp:  Rfl:    Clopidogrel Bisulfate (PLAVIX) 75 MG Oral Tab Take 75 mg by mouth daily. Disp:  Rfl:    Ranitidine HCl (ZANTAC) 150 MG Oral Cap Take 300 mg by mouth daily. Musculoskeletal: Moves all extremities. Extremities: No edema or cyanosis. Integument: No rashes or lesions. Psychiatric: Appropriate mood and affect.       Diagnostic Data:      Labs:  Recent Labs   Lab  02/17/17 1912   WBC  10.4   HGB  12.9*   MCV Filed:  2/19/2017 11:10 AM Note Time:  2/19/2017 11:04 AM Status:  Signed    :  Erica Ayala PT (Physical Therapist)               PHYSICAL THERAPY EVALUATION - INPATIENT     Room Number: 421/421-A  Evaluation Date: 2/19/2017  Type of Evaluation: in a first floor addition, which has no stairs.  He reports that he is independent with ADLs and self-care tasks and ambulates without an assistive device in the home, but utilizes a cane in the community    SUBJECTIVE  The patient reports that he feels lik Pattern: Shuffle     Comment : per department FIM definitions    Skilled Therapy Provided: Performed seated therapeutic exercise, transfers, ambulation and assessment of standing balance.  The patient was able to perform sit to stand transfers with stand by Number of Visits to Meet Established Goals: 3      CURRENT GOALS    Goal #1 Patient is able to demonstrate supine - sit EOB @ level: modified independent     Goal #2 Patient is able to demonstrate transfers Sit to/from Stand at assistance level: modified i

## (undated) NOTE — LETTER
[facility] [address], [address]  Consent for Procedure/Sedation    Date:     Time:       1.  I authorize the performance upon [employer] the following: LEFT PERCUTANEOUS NEPHROSTOMY TUBE INSERTION AND OR CHANGE    2. I Tosha Ramos to patient:    ________________________________    ___________________    Witness: _________________________      Date: ___________________    Printed: 1/15/2020 Total face to face time was spent*, more than 50% of the time was spent in counseling and/or c

## (undated) NOTE — LETTER
Consent to Procedure/Sedation    Date: __________________    Time: _______________    1. I authorize the performance upon [employer] the following:      Insertion Left Nephrostomy Tube    2.  I authorize Dr. Alyssa Mejia (and whomever is designated as the doctor’s Witness: ____________________     Date: ______________    Printed: 2020   9:35 AM    Patient Name: Brett Vásquez        : 10/3/1927       Medical Record #: [id number]

## (undated) NOTE — LETTER
[facility]  Ama Mohr 61 94[address] [facility], [address] [place] [address]    Consent for Operation    Date: __________________    Time: _______________    1.  I authorize the performance upon [employer] the following operation:    Procedure(s):  CYSTOSCOPY, LEFT RETRO revealed by the pictures or by descriptive texts accompanying them. If the procedure has been videotaped, the surgeon will obtain the original videotape. The hospital will not be responsible for storage or maintenance of this tape.     6. For the purpose of THAT MY DOCTOR PROVIDED ME WITH THE ABOVE EXPLANATIONS, THAT ALL BLANKS OR STATEMENTS REQUIRING INSERTION OR COMPLETION WERE FILLED IN.     Signature of Patient:   ___________________________    When the patient is a minor or mentally incompetent to give co supplements, and pills I can buy without a prescription (including street drugs/illegal medications). Failure to inform my anesthesiologist about these medicines may increase my risk of anesthetic complications.   · If I am allergic to anything or have had Anesthesiologist Signature     Date   Time  I have discussed the procedure and information above with the patient (or patient’s representative) and answered their questions. The patient or their representative has agreed to have anesthesia services.     ___

## (undated) NOTE — LETTER
[facility] [address], [address]  Consent for Procedure/Sedation    Date:     Time:       1.  I authorize the performance upon [employer] the following: Peripheral angiography, atherectomy, percutaneous transluminal angioplasty you may develop a skin reaction.       Signature of Patient: _______________________________________________________    Signature of person authorized                                           Relationship to  to consent for patient: _______________________

## (undated) NOTE — LETTER
[facility]  Ama Mohr 61 [address] [facility], [address] [place] [address]    Consent for Operation    Date: __________________    Time: _______________    1.  I authorize the performance upon [employer] the following operation:    * No procedures listed * ***    2. I videotape. [facility] will not be responsible for storage or maintenance of this tape. 6. For the purpose of advancing medical education, I consent to the admittance of observers to the Operating Room.     7. I authorize the use of any specimen, organs Signature of Patient:   ___________________________    When the patient is a minor or mentally incompetent to give consent:  Signature of person authorized to consent for patient: ___________________________   Relationship to patient: _____________________ these medicines may increase my risk of anesthetic complications. · If I am allergic to anything or have had a reaction to anesthesia before. 3. I understand how the anesthesia medicine will help me (benefits).     4. I understand that with any type of patient’s representative) and answered their questions. The patient or their representative has agreed to have anesthesia services.     _____________________________________________________________________________  Witness        Date   Time  I have hari

## (undated) NOTE — LETTER
Consent to Procedure/Sedation    Date: 8/8/2018    Time: _______________    1. I authorize the performance upon [employer] the following:    Left Nephrostomy Tube Exchange    2.  I authorize Dr. Melville Bosworth (and whomever is designated as the doctor’s assistant), Witness: ____________________     Date: ______________    Printed: 2018   8:23 AM    Patient Name: Bonita Colmenares        : 10/3/1927       Medical Record #: [id number]

## (undated) NOTE — IP AVS SNAPSHOT
Patient Demographics     Address  [address] [place] [address] [address] [address]  [place] [address] Phone  [phone] [facility])      Emergency Contact(s)     Name Relation Home Work Lyle Ambrocio 32 Daughter [phone]    Melvina Sensing   [phone] Calcium Carbonate-Vitamin D 500-200 MG-UNIT Tabs  Commonly known as:  OSCAL-500  Next dose due: Tomorrow morning 7/25      Take 1 tablet by mouth daily. Clopidogrel Bisulfate 75 MG Tabs  Commonly known as:  PLAVIX  Next dose due:   Tomorrow juju Take 0.5 tablets (10 mg total) by mouth every other day. Venkata Delcid MD         VITAMIN A  Next dose due:   Tonight at bedtime      10,000 units daily at bedtime          XGEVA 120 MG/1.7ML Soln  Generic drug:  denosumab      Inject 120 mg into the ski [id number] mirtazapine (REMERON) tab 15 mg 07/23/18 2059 Given            LEFT LOWER ABDOMEN     Order ID Medication Name Action Time Action Reason Comments    [id number] Heparin Sodium (Porcine) 5000 UNIT/ML injection 5,000 Units 07/23/18 2059 Given MAGNESIUM [[id number]] (Normal)  Resulted: 07/24/18 9987, Result status: Final result   Ordering provider:  Tano Etienne MD  07/23/18 1092 Resulting lab:  [facility]    Specimen Information    Type Source Collected On   Blood — 07/24/18 0524          Comp <=16  Sensitive     Piperacillin + Tazobactam 64  Intermediate    64  Intermediate     Trimethoprim/Sulfa 80  Resistant    <=20  Sensitive                    Blood Culture FREQ X 2 [[id number]]  (Abnormal)  (Susceptibility) Collected:  07/18/18 2057    Jana Hamilton • Anemia 10/5/2013   • Arthritis    • Back pain    • Bloating    • Cancer (HCC)     prostate CA; hx lymphoma;skin CA   • CORONARY ARTERY DISEASE     hx stents   • Diarrhea, unspecified    • Dizziness    • Easy bruising    • Extrinsic asthma, unspecified aspirin 81 MG Oral Chew Tab Chew 81 mg by mouth daily. Disp:  Rfl:    multiple vitamin Oral Chew Tab Chew 1 tablet by mouth daily. Disp:  Rfl:    Clopidogrel Bisulfate (PLAVIX) 75 MG Oral Tab Take 75 mg by mouth daily.  Disp:  Rfl:    mirtazapine (Alex Foot MCV  94.8   PLT  203.0   BAND  16       Recent Labs   Lab  07/18/18 2057   GLU  139*   BUN  76*   CREATSERUM  4.71*   GFRAA  12*   GFRNAA  10*   CA  9.4   ALB  3.2*   NA  141   K  4.2   CL  106   CO2  26.0   ALKPHO  69   AST  14*   ALT  19   BILT  0.5  10/3/1927 MRN [id number]   [facility] 7NE-A Attending Noble Clemens MD   Hosp Day # 0 PCP Marcene Reddish MD SEVERINO       Assessment / Plan:    1) ETHAN- due to obstructive uropathy + cardiorenal syndrome + UTI / dehydration.  PLAN- gentle IV prostate CA: straight caths   • Hearing impairment    • Hearing loss    • Hypotension    • Indigestion    • Neuropathy    • Osteoarthritis    • Osteoporosis    • Pain in joints    • PERIPHERAL VASCULAR DISEASE 2007    left leg bypass sx   • Peripheral vas •  bisacodyl (DULCOLAX) rectal suppository 10 mg, 10 mg, Rectal, Daily PRN  •  docusate sodium (COLACE) cap 100 mg, 100 mg, Oral, BID PRN  •  [START ON 7/20/2018] Vancomycin HCl (VANCOCIN) 1,250 mg in sodium chloride 0.9 % 500 mL IVPB, 15 mg/kg, Intravenou All imaging studies reviewed. Thank you for allowing me to participate in the care of your patient. Shadia Cordero  7/19/2018  8:32 AM[HF.1]      Electronically signed by John Sams MD on 7/19/2018  8:36 AM   Attribution Hansen    HF. 1 - Roxanne Blow tube placement on 6/18/18. [AZ.2]    Problem List[AZ. 1]  Principal Problem:    Sepsis due to Escherichia coli [facility])  Active Problems:    Urinary tract infection with hematuria, site unspecified    Sepsis, due to unspecified organism ([facility]    Maggie Opal \"I hope to get stronger so I can go back home\"[AZ.3]    Patient self-stated goal is[AZ.1] return home; pt went to [place] after dc here from [facility] on 7/7/18[AZ.3]    OBJECTIVE[AZ.1]  Precautions:  (Contact)  Fall Risk: Standard fall Martina Nadir. 3]    WEIGHT B Gait Assistance: Dependent assistance ((cga))  Distance (ft): 100  Assistive Device: Rolling walker  Pattern:  (Decr andrea/step length/heel-toe pattern)  Stoop/Curb Assistance: Not Amanda Dine. 3]       Skilled Therapy Provided:[AZ.1] Received in [facility] i PT Treatment Plan: Bed mobility; Patient education;Gait training;Range of motion;Strengthening;Transfer training  Rehab Potential : Good  Frequency (Obs): 5x/week  Number of Visits to Meet Established Goals: Yulissa Godfrey. 3]      CURRENT GOALS    Goal #1 Patient is

## (undated) NOTE — LETTER
Consent to Procedure/Sedation    Date: 06/18/2018    Time: 1530    1. I authorize the performance upon [employer] the following:    Left nephrostomy tube insertion    2.  I authorize Dr. Tyrone Schulte  (and whomever is designated as the doctor’s assistant), to Witness: ____________________     Date: ______________    Printed: 2018   3:14 PM    Patient Name: Marni Linda        : 10/3/1927       Medical Record #: [id number]

## (undated) NOTE — LETTER
[facility] [address], [address]  Consent for Procedure/Sedation    Date:     Time:       1.  I authorize the performance upon [employer] the following: LEFT PERCUTANEOUS NEPHROSTOMY TUBE INSERTION AND OR CHANGE    2. I Alba Gomes to patient:    ________________________________    ___________________    Witness: _________________________      Date: ___________________    Printed: 1/15/2020 Total face to face time was , more than 50% of the time was spent in counseling and/or coordin

## (undated) NOTE — IP AVS SNAPSHOT
[facility] [place]  [address] [place], [address] ~ [phone]                Discharge Summary   2/17/2017    5 [facility]            Admission Information        Provider Department    2/17/2017 Blaise León MD  4nw-A         Rafia Roy Take 200 mg by mouth See Admin Instructions. Every 2-3 days      See instruction                   Clopidogrel Bisulfate 75 MG Tabs   Last time this was given:  75 mg on 2/19/2017  8:27 AM   Commonly known as:  PLAVIX        Take 75 mg by mouth daily. Recent Hematology Lab Results  (Last 3 results in the past 90 days)    WBC RBC Hemoglobin Hematocrit MCV MCH MCHC RDW Platelet MPV    (34/68/87)  8.3 (02/18/17)  3.95 (02/18/17)  11.7 (L) (02/18/17)  36.6 (L) (02/18/17)  92.7 (02/18/17)  29.6 (02/18/17)  3 - If you don’t have insurance, Mariano Briscoe has partnered with [facility] to help you get signed up for insurance coverage.   [facility] is a Federal Navigator program that can help with your Affordable Care Act cover What to report to your healthcare team:  Dizziness, muscle aches, constipation           Other Blood Thinners     Platelet Aggregation Inhibitors    Clopidogrel Bisulfate (PLAVIX) 75 MG Oral Tab       Use: Prevent the development or progression of blood cl Use: Improve urine flow that has become difficult because of an enlarged prostate   Most common side effects: Impotence, decreased libido, low blood pressure, gynecomastia   What to report to your healthcare team: Dizziness, breast tissue enlargement, abn